# Patient Record
Sex: MALE | Race: ASIAN | NOT HISPANIC OR LATINO | URBAN - METROPOLITAN AREA
[De-identification: names, ages, dates, MRNs, and addresses within clinical notes are randomized per-mention and may not be internally consistent; named-entity substitution may affect disease eponyms.]

---

## 2018-10-11 ENCOUNTER — EMERGENCY (EMERGENCY)
Facility: HOSPITAL | Age: 53
LOS: 1 days | Discharge: HOME | End: 2018-10-11
Admitting: EMERGENCY MEDICINE
Payer: MEDICAID

## 2018-10-11 VITALS
OXYGEN SATURATION: 100 % | HEART RATE: 60 BPM | SYSTOLIC BLOOD PRESSURE: 107 MMHG | DIASTOLIC BLOOD PRESSURE: 64 MMHG | RESPIRATION RATE: 16 BRPM | TEMPERATURE: 98 F

## 2018-10-11 VITALS
OXYGEN SATURATION: 100 % | DIASTOLIC BLOOD PRESSURE: 69 MMHG | SYSTOLIC BLOOD PRESSURE: 99 MMHG | HEART RATE: 78 BPM | TEMPERATURE: 98 F | RESPIRATION RATE: 16 BRPM

## 2018-10-11 LAB
ALBUMIN SERPL ELPH-MCNC: 4.1 G/DL — SIGNIFICANT CHANGE UP (ref 3.3–5)
ALP SERPL-CCNC: 177 U/L — HIGH (ref 40–120)
ALT FLD-CCNC: 41 U/L — SIGNIFICANT CHANGE UP (ref 4–41)
AMYLASE P1 CFR SERPL: 34 U/L — SIGNIFICANT CHANGE UP (ref 25–125)
AST SERPL-CCNC: 46 U/L — HIGH (ref 4–40)
BASE EXCESS BLDV CALC-SCNC: -0.9 MMOL/L — SIGNIFICANT CHANGE UP
BASOPHILS # BLD AUTO: 0.03 K/UL — SIGNIFICANT CHANGE UP (ref 0–0.2)
BASOPHILS NFR BLD AUTO: 0.7 % — SIGNIFICANT CHANGE UP (ref 0–2)
BILIRUB SERPL-MCNC: 0.4 MG/DL — SIGNIFICANT CHANGE UP (ref 0.2–1.2)
BLOOD GAS VENOUS - CREATININE: 0.44 MG/DL — LOW (ref 0.5–1.3)
BUN SERPL-MCNC: 4 MG/DL — LOW (ref 7–23)
CALCIUM SERPL-MCNC: 8.9 MG/DL — SIGNIFICANT CHANGE UP (ref 8.4–10.5)
CHLORIDE BLDV-SCNC: 104 MMOL/L — SIGNIFICANT CHANGE UP (ref 96–108)
CHLORIDE SERPL-SCNC: 96 MMOL/L — LOW (ref 98–107)
CO2 SERPL-SCNC: 22 MMOL/L — SIGNIFICANT CHANGE UP (ref 22–31)
CREAT SERPL-MCNC: 0.4 MG/DL — LOW (ref 0.5–1.3)
EOSINOPHIL # BLD AUTO: 0.08 K/UL — SIGNIFICANT CHANGE UP (ref 0–0.5)
EOSINOPHIL NFR BLD AUTO: 1.9 % — SIGNIFICANT CHANGE UP (ref 0–6)
GAS PNL BLDV: 130 MMOL/L — LOW (ref 136–146)
GLUCOSE BLDV-MCNC: 129 — HIGH (ref 70–99)
GLUCOSE SERPL-MCNC: 139 MG/DL — HIGH (ref 70–99)
HCO3 BLDV-SCNC: 23 MMOL/L — SIGNIFICANT CHANGE UP (ref 20–27)
HCT VFR BLD CALC: 36.3 % — LOW (ref 39–50)
HCT VFR BLDV CALC: 33.3 % — LOW (ref 39–51)
HGB BLD-MCNC: 12 G/DL — LOW (ref 13–17)
HGB BLDV-MCNC: 10.8 G/DL — LOW (ref 13–17)
IMM GRANULOCYTES # BLD AUTO: 0.02 # — SIGNIFICANT CHANGE UP
IMM GRANULOCYTES NFR BLD AUTO: 0.5 % — SIGNIFICANT CHANGE UP (ref 0–1.5)
LACTATE BLDV-MCNC: 1.8 MMOL/L — SIGNIFICANT CHANGE UP (ref 0.5–2)
LIDOCAIN IGE QN: 5.1 U/L — LOW (ref 7–60)
LYMPHOCYTES # BLD AUTO: 0.71 K/UL — LOW (ref 1–3.3)
LYMPHOCYTES # BLD AUTO: 16.4 % — SIGNIFICANT CHANGE UP (ref 13–44)
MAGNESIUM SERPL-MCNC: 2.2 MG/DL — SIGNIFICANT CHANGE UP (ref 1.6–2.6)
MCHC RBC-ENTMCNC: 27.6 PG — SIGNIFICANT CHANGE UP (ref 27–34)
MCHC RBC-ENTMCNC: 33.1 % — SIGNIFICANT CHANGE UP (ref 32–36)
MCV RBC AUTO: 83.4 FL — SIGNIFICANT CHANGE UP (ref 80–100)
MONOCYTES # BLD AUTO: 0.67 K/UL — SIGNIFICANT CHANGE UP (ref 0–0.9)
MONOCYTES NFR BLD AUTO: 15.5 % — HIGH (ref 2–14)
NEUTROPHILS # BLD AUTO: 2.81 K/UL — SIGNIFICANT CHANGE UP (ref 1.8–7.4)
NEUTROPHILS NFR BLD AUTO: 65 % — SIGNIFICANT CHANGE UP (ref 43–77)
NRBC # FLD: 0 — SIGNIFICANT CHANGE UP
PCO2 BLDV: 45 MMHG — SIGNIFICANT CHANGE UP (ref 41–51)
PH BLDV: 7.35 PH — SIGNIFICANT CHANGE UP (ref 7.32–7.43)
PHOSPHATE SERPL-MCNC: 2.5 MG/DL — SIGNIFICANT CHANGE UP (ref 2.5–4.5)
PLATELET # BLD AUTO: 320 K/UL — SIGNIFICANT CHANGE UP (ref 150–400)
PMV BLD: 9.6 FL — SIGNIFICANT CHANGE UP (ref 7–13)
PO2 BLDV: 34 MMHG — LOW (ref 35–40)
POTASSIUM BLDV-SCNC: 3.4 MMOL/L — SIGNIFICANT CHANGE UP (ref 3.4–4.5)
POTASSIUM SERPL-MCNC: 4.3 MMOL/L — SIGNIFICANT CHANGE UP (ref 3.5–5.3)
POTASSIUM SERPL-SCNC: 4.3 MMOL/L — SIGNIFICANT CHANGE UP (ref 3.5–5.3)
PROT SERPL-MCNC: 7.6 G/DL — SIGNIFICANT CHANGE UP (ref 6–8.3)
RBC # BLD: 4.35 M/UL — SIGNIFICANT CHANGE UP (ref 4.2–5.8)
RBC # FLD: 15.9 % — HIGH (ref 10.3–14.5)
SAO2 % BLDV: 53.1 % — LOW (ref 60–85)
SODIUM SERPL-SCNC: 133 MMOL/L — LOW (ref 135–145)
WBC # BLD: 4.32 K/UL — SIGNIFICANT CHANGE UP (ref 3.8–10.5)
WBC # FLD AUTO: 4.32 K/UL — SIGNIFICANT CHANGE UP (ref 3.8–10.5)

## 2018-10-11 PROCEDURE — 99284 EMERGENCY DEPT VISIT MOD MDM: CPT

## 2018-10-11 PROCEDURE — 74177 CT ABD & PELVIS W/CONTRAST: CPT | Mod: 26

## 2018-10-11 RX ORDER — SODIUM CHLORIDE 9 MG/ML
1000 INJECTION INTRAMUSCULAR; INTRAVENOUS; SUBCUTANEOUS ONCE
Qty: 0 | Refills: 0 | Status: COMPLETED | OUTPATIENT
Start: 2018-10-11 | End: 2018-10-11

## 2018-10-11 RX ADMIN — SODIUM CHLORIDE 1000 MILLILITER(S): 9 INJECTION INTRAMUSCULAR; INTRAVENOUS; SUBCUTANEOUS at 17:22

## 2018-10-11 NOTE — ED PROVIDER NOTE - OBJECTIVE STATEMENT
53 year-old male with history of pancreatic cancer (undergoing chemo and radiation in [facility]) presents to the Emergency Department for nausea, vomiting and abdominal pain.  Patient last treated in May via chemotherapy. 53 year-old male with history of pancreatic cancer (undergoing chemo and radiation in [facility]) presents to the Emergency Department for nausea, vomiting and abdominal pain.  Patient last treated in May via chemotherapy.  Patient has a history of pancreatic adenocarcinoma diagnosed in late 2017 treated with resection, chemotherapy and radiotherapy in [facility].  Patient emigrated from [facility] last month and has no oncological follow-up at this time.  Reports upper abdominal reflux symptoms, and occasional nausea and vomiting that has worsened over the last 3 days.  Patient able to tolerate PO intake.

## 2018-10-11 NOTE — ED PROVIDER NOTE - CARE PROVIDERS DIRECT ADDRESSES
,DirectAddress_Unknown,[email or web address]

## 2018-10-11 NOTE — ED ADULT NURSE NOTE - OBJECTIVE STATEMENT
Break Coverage RN: Patient is a 54 y/o male a&ox4 with a PMH of pancreatic cancer and DM.  Patient reported pancreatic surgery in Nov of 2017, underwent chemo and radiation, last chemotherapy in May 2018.  Patient reports presenting to ED today with a c/c of RUQ pain radiating to LUQ.  Patient reports the pain is intermittent, worsens with PO intake.  Patient reports intermittent episodes of NBNB vomiting.  Denies fevers/chills, SOB, CP, dysuria/hematuria.  Patient in nad, respirations unlabored, MD at bedside. Break Coverage RN: Patient is a 52 y/o male a&ox4 with a PMH of pancreatic cancer and DM.  Patient reported pancreatic surgery in Nov of 2017, underwent chemo and radiation, last chemotherapy in May 2018.  Patient reports presenting to ED today with a c/c of RUQ pain radiating to LUQ.  Patient reports the pain is intermittent, worsens with PO intake.  Patient reports intermittent episodes of NBNB vomiting that has been ongoing since May of 2018.  Denies fevers/chills, SOB, CP, dysuria/hematuria.  Patient in nad, respirations unlabored, MD at bedside.

## 2018-10-11 NOTE — ED PROVIDER NOTE - PROGRESS NOTE DETAILS
Resident, Blinder: patient seen by Surgery for CT results. patient has documentation with him that states that he has had this same issue persistent but had further studies to prove that there is no obstruction. Patient feeling well, no N/V in ER. Has been passing gas and BM. States that he is vomiting because he ate too much, and usually vomits after that. Would like to get surg onc and med onc follow up. Will provide those details and send home. No other concerns.

## 2018-10-11 NOTE — ED PROVIDER NOTE - CARE PROVIDER_API CALL
Derek Fairbanks), Surgery  [address] [place] [address]  Phone: [phone]  Fax: [phone]    Jordon Almanza), Hematology; Internal Medicine; Medical Oncology  [address] [place] [address]  Phone: [phone]  Fax: [phone]

## 2018-10-11 NOTE — ED PROVIDER NOTE - PHYSICAL EXAMINATION
*Gen: NAD, AAO*3; appears thin but not acutely ill  *HEENT: NC/AT, MMM, airway patent, trachea midline  *CV: RRR, S1/S2 present, no murmurs/rubs/gallops  *Resp: no respiratory distress, LCTAB, no wheezing/rales/rhonchi  *Abd: non-distended, soft N/Tx4, no guarding or rigidity, surgical scars present  *Neuro: no focal neuro deficits, moving all limbs appropriately  *Extremities: no gross deformity  *Skin: no rashes, no wounds   ~ Karen Denis M.D.

## 2018-10-11 NOTE — ED ADULT NURSE NOTE - NSIMPLEMENTINTERV_GEN_ALL_ED
Implemented All Fall with Harm Risk Interventions:  [place] to call system. Call bell, personal items and telephone within reach. Instruct patient to call for assistance. Room bathroom lighting operational. Non-slip footwear when patient is off stretcher. Physically safe environment: no spills, clutter or unnecessary equipment. Stretcher in lowest position, wheels locked, appropriate side rails in place. Provide visual cue, wrist band, yellow gown, etc. Monitor gait and stability. Monitor for mental status changes and reorient to person, place, and time. Review medications for side effects contributing to fall risk. Reinforce activity limits and safety measures with patient and family. Provide visual clues: red socks.

## 2018-10-11 NOTE — ED ADULT NURSE REASSESSMENT NOTE - NS ED NURSE REASSESS COMMENT FT1
Pt. received in spot 24a; report from LA Todd RN. Pt. A&O x3 with c/o abdominal pain s/p CT scan. awaiting results. Pt. asking to eat. explained to patient that he has to wait on results before eating. no distress noted. no c/o pain.

## 2018-10-11 NOTE — ED PROVIDER NOTE - ATTENDING CONTRIBUTION TO CARE
carlyn: hx pancreatic adenoca with [place] and chemotherapy[place]. Pt notes intermittent vomiting since MAy 2018. [place] and [place] not yet established medical care. He presents with the hx of vomiting on an doff 2-3 days, c/o weakness, and requests referral to an oncologist.  exam: NAD. thin but does not appear acutely ill. exam otherwise unremarkable.   labs pending.  will hydrate, obtain CT abdomen, check labs. carlyn: hx pancreatic adenoca with resection and chemotherapy/[place]. Pt notes intermittent vomiting since MAy 2018. [place] and has not yet established medical care. He presents with the hx of vomiting on an doff 2-3 days, c/o weakness, and requests referral to an oncologist.  exam: NAD. thin but does not appear acutely ill. exam otherwise unremarkable.   labs pending.  will hydrate, obtain CT abdomen, check labs..

## 2018-10-11 NOTE — CONSULT NOTE ADULT - ASSESSMENT
53M s/p R1 resection [Whipple 11/2017] for T2N2M0 adenocarcinoma of the pancreas followed by adjuvant CRT w/Capecitabine / Gemcitabine who presents with recurrent nausea, vomiting and CT imaging showing a stable dilated proximal jejunum of Timmy limb. Finished CRT in May/2018 without evidence of recurrent disease.       - While marked distention of the gastrojejunal limb to the level of the distal anastomosis was concerning for obstruction, patient has recent reports w/gastroffin showing non-obstructed anatomy. Patient is not clinically obstructed as he remains passing flatus and having normal bowel movements  - no current surgical intervention indicated  - patient may establish care [place] with Dr. Fairbanks [Surgical Oncology Office#:[phone]]  - sent of CEA 19-9 level for followup care  - above plan d/w Dr. Tiki Lamas [facility] PGY3  [id number]

## 2018-10-11 NOTE — ED ADULT NURSE NOTE - CHIEF COMPLAINT QUOTE
Pt states that he was undergoing chemo and radiation in [facility] for pancreatic cancer, last treatment in May, now c/o nausea, vomiting and abd pain since February.  PMH pancreatic cancer

## 2018-10-11 NOTE — CONSULT NOTE ADULT - SUBJECTIVE AND OBJECTIVE BOX
Surgical Oncology  CC: s/p Whipple, p/w nausea and vomiting  HPI: 53M s/p R1 resection [Whipple 11/2017] for T2N2M0 adenocarcinoma of the pancreas followed by adjuvant CRT w/Capecitabine / Gemcitabine who presents with recurrent nausea, vomiting and CT imaging showing a stable dilated proximal jejunum of Timmy limb. Finished CRT in May/2018 without evidence of recurrent disease. While marked distention of the gastrojejunal limb to the level of the distal anastomosis was concerning for   obstruction, patient has recent reports w/gastroffin showing non-obstructed anatomy. Patient would like surgical and medical oncology [place]. Patient is not clinically obstructed, as he remains passing flatus and having bowel movements. He is non-toxic appearing, afebrile, non-leukocytotic and hemodynamically normal.       Medical and Surgical History  Pancreatic cancer s/p Whipple      Review of Systems:   General: denies weight change, fever or fatigue  HEENT: denies sore throat, hoarseness  Respiratory: denies cough, shortness of breath at rest and on exertion, wheezing  Cardiovascular: denies chest pain, abnormal heart rhythm, PND, palpitations  Gastrointestinal: resolved nausea and vomiting; denies diarrhea, bloody or black bowel movements  Genitourinary: denies frequent urination, painful urination, kidney disease         Social History  Smoking Hx: denies  Etoh Hx: denies  IVDA Hx: denies     Objective:   Vital Signs Last 24 Hrs  T(C): 36.6 (11 Oct 2018 21:23), Max: 36.9 (11 Oct 2018 15:59)  T(F): 97.8 (11 Oct 2018 21:23), Max: 98.5 (11 Oct 2018 15:59)  HR: 60 (11 Oct 2018 21:23) (60 - 78)  BP: 107/64 (11 Oct 2018 21:23) (99/69 - 107/64)  BP(mean): --  RR: 16 (11 Oct 2018 21:23) (16 - 16)  SpO2: 100% (11 Oct 2018 21:23) (100% - 100%)    Physical Exam:  General: Well developed, well nourished,   and appears to be in no acute distress.  HEENT: normocephalic   Neck: Neck supple, non-tender without lymphadenopathy, masses or thyromegaly  Chest: lungs clear bilaterally, non-labored breathing, no wheezing or rhonci  Cardiac: Regular rhythm, rate of 66  Abdomen: soft, non-distended, non-tender, no guarding or rebound  Extremities: No significant deformity or joint abnormality. No edema. Peripheral pulses intact. No varicosities.       LABS:                        12.0   4.32  )-----------( 320      ( 11 Oct 2018 17:17 )             36.3     10-11    133<L>  |  96<L>  |  4<L>  ----------------------------<  139<H>  4.3   |  22  |  0.40<L>    Ca    8.9      11 Oct 2018 17:17  Phos  2.5     10-11  Mg     2.2     10-11    TPro  7.6  /  Alb  4.1  /  TBili  0.4  /  DBili  x   /  AST  46<H>  /  ALT  41  /  AlkPhos  177<H>  10-11

## 2018-10-11 NOTE — ED PROVIDER NOTE - MEDICAL DECISION MAKING DETAILS
53 year-old male with history of pancreatic cancer (undergoing chemo and radiation in [facility]) presents to the ED for nausea, vomiting and abdominal pain - non-toxic appearing and benign abdominal exam.  Plan to do a CT with PO and IC contrast to eval for obstruction given history of cancer, resection and radiation therapy.  CBC, CMP, IVF and reassess.

## 2018-10-12 ENCOUNTER — INPATIENT (INPATIENT)
Facility: HOSPITAL | Age: 53
LOS: 11 days | Discharge: HOME | End: 2018-10-24
Attending: SURGERY | Admitting: SURGERY
Payer: MEDICAID

## 2018-10-12 VITALS
DIASTOLIC BLOOD PRESSURE: 64 MMHG | OXYGEN SATURATION: 100 % | TEMPERATURE: 97 F | RESPIRATION RATE: 16 BRPM | HEART RATE: 70 BPM | SYSTOLIC BLOOD PRESSURE: 91 MMHG

## 2018-10-12 DIAGNOSIS — K56.609 UNSPECIFIED INTESTINAL OBSTRUCTION, UNSPECIFIED AS TO PARTIAL VERSUS COMPLETE OBSTRUCTION: ICD-10-CM

## 2018-10-12 LAB — CANCER AG19-9 SERPL-ACNC: < 1 U/ML — SIGNIFICANT CHANGE UP

## 2018-10-12 PROCEDURE — 99223 1ST HOSP IP/OBS HIGH 75: CPT

## 2018-10-12 RX ORDER — PANTOPRAZOLE SODIUM 20 MG/1
40 TABLET, DELAYED RELEASE ORAL DAILY
Qty: 0 | Refills: 0 | Status: DISCONTINUED | OUTPATIENT
Start: 2018-10-12 | End: 2018-10-15

## 2018-10-12 RX ORDER — ENOXAPARIN SODIUM 100 MG/ML
40 INJECTION SUBCUTANEOUS DAILY
Qty: 0 | Refills: 0 | Status: DISCONTINUED | OUTPATIENT
Start: 2018-10-12 | End: 2018-10-18

## 2018-10-12 RX ORDER — INSULIN LISPRO 100/ML
VIAL (ML) SUBCUTANEOUS
Qty: 0 | Refills: 0 | Status: DISCONTINUED | OUTPATIENT
Start: 2018-10-12 | End: 2018-10-12

## 2018-10-12 RX ORDER — INSULIN LISPRO 100/ML
VIAL (ML) SUBCUTANEOUS EVERY 6 HOURS
Qty: 0 | Refills: 0 | Status: DISCONTINUED | OUTPATIENT
Start: 2018-10-12 | End: 2018-10-15

## 2018-10-12 RX ORDER — SODIUM CHLORIDE 9 MG/ML
1000 INJECTION INTRAMUSCULAR; INTRAVENOUS; SUBCUTANEOUS
Qty: 0 | Refills: 0 | Status: DISCONTINUED | OUTPATIENT
Start: 2018-10-12 | End: 2018-10-12

## 2018-10-12 RX ORDER — SODIUM CHLORIDE 9 MG/ML
1000 INJECTION, SOLUTION INTRAVENOUS
Qty: 0 | Refills: 0 | Status: DISCONTINUED | OUTPATIENT
Start: 2018-10-12 | End: 2018-10-15

## 2018-10-12 RX ORDER — SODIUM CHLORIDE 9 MG/ML
1000 INJECTION INTRAMUSCULAR; INTRAVENOUS; SUBCUTANEOUS ONCE
Qty: 0 | Refills: 0 | Status: COMPLETED | OUTPATIENT
Start: 2018-10-12 | End: 2018-10-12

## 2018-10-12 RX ADMIN — SODIUM CHLORIDE 1000 MILLILITER(S): 9 INJECTION INTRAMUSCULAR; INTRAVENOUS; SUBCUTANEOUS at 15:29

## 2018-10-12 RX ADMIN — SODIUM CHLORIDE 125 MILLILITER(S): 9 INJECTION INTRAMUSCULAR; INTRAVENOUS; SUBCUTANEOUS at 16:54

## 2018-10-12 RX ADMIN — SODIUM CHLORIDE 125 MILLILITER(S): 9 INJECTION, SOLUTION INTRAVENOUS at 20:06

## 2018-10-12 NOTE — ED ADULT NURSE NOTE - OBJECTIVE STATEMENT
Pt a&ox3 c/o abdominal pain accompanied with n/v, pt was treated here yesterday and dc'd, called back to ED for SOB, pt denies sob breathing even and unlabored, denies cp/discomfort, denies headache/dizziness, abd soft, non-tender, non-distended, skin is cool dry and intact, ivl placed, labs sent, will continue to monitor.

## 2018-10-12 NOTE — H&P ADULT - HISTORY OF PRESENT ILLNESS
HPI: 53M s/p R1 resection [Whipple 11/2017] for T2N2M0 adenocarcinoma of the pancreas followed by adjuvant CRT w/Capecitabine / Gemcitabine who presents with recurrent nausea, vomiting and CT imaging showing a stable dilated proximal jejunum of Timmy limb. Finished CRT in May/2018 without evidence of recurrent disease. He underwent small bowel study showing passage of the contrast to the large intestine, however, he had a stricture at the J-J. His surgeon advised him to take 6 small meals instead of 3 meals. His symptoms slightly improved but he continues to have distension and nausea, which improves after vomiting. A CT scan of the abdomen performed yesterday showed bowel obstruction at the J-J anastomosis with marked dilatation of the small bowel.      Medical and Surgical History  Pancreatic cancer s/p Whipple    Review of Systems:   General: denies weight change, fever or fatigue  HEENT: denies sore throat, hoarseness  Respiratory: denies cough, shortness of breath at rest and on exertion, wheezing  Cardiovascular: denies chest pain, abnormal heart rhythm, PND, palpitations  Gastrointestinal: resolved nausea and vomiting; denies diarrhea, bloody or black bowel movements  Genitourinary: denies frequent urination, painful urination, kidney disease         Social History  Smoking Hx: denies  Etoh Hx: denies  IVDA Hx: denies     Objective:   Vital Signs Last 24 Hrs  T(C): 36.6 (11 Oct 2018 21:23), Max: 36.9 (11 Oct 2018 15:59)  T(F): 97.8 (11 Oct 2018 21:23), Max: 98.5 (11 Oct 2018 15:59)  HR: 60 (11 Oct 2018 21:23) (60 - 78)  BP: 107/64 (11 Oct 2018 21:23) (99/69 - 107/64)  BP(mean): --  RR: 16 (11 Oct 2018 21:23) (16 - 16)  SpO2: 100% (11 Oct 2018 21:23) (100% - 100%)    Physical Exam:  General: Well developed, well nourished,   and appears to be in no acute distress.  HEENT: normocephalic   Neck: Neck supple, non-tender without lymphadenopathy, masses or thyromegaly  Chest: lungs clear bilaterally, non-labored breathing, no wheezing or rhonci  Cardiac: Regular rhythm, rate of 66  Abdomen: soft, non-distended, non-tender, no guarding or rebound  Extremities: No significant deformity or joint abnormality. No edema. Peripheral pulses intact. No varicosities.       LABS:                        12.0   4.32  )-----------( 320      ( 11 Oct 2018 17:17 )             36.3     10-11    133<L>  |  96<L>  |  4<L>  ----------------------------<  139<H>  4.3   |  22  |  0.40<L>    Ca    8.9      11 Oct 2018 17:17  Phos  2.5     10-11  Mg     2.2     10-11    TPro  7.6  /  Alb  4.1  /  TBili  0.4  /  DBili  x   /  AST  46<H>  /  ALT  41  /  AlkPhos  177<H>  10-11

## 2018-10-12 NOTE — ED ADULT TRIAGE NOTE - CHIEF COMPLAINT QUOTE
Pt dcd yesterday for NV, called back today for SBO. Pt endorses mild abd pain at present time. Denies NV.

## 2018-10-12 NOTE — ED PROVIDER NOTE - ATTENDING CONTRIBUTION TO CARE
Attending note:   After face to face evaluation of this patient, I concur with above noted hx, pe, and care plan for this patient.  52 y/o M seen in ED yesterday with reading of sbo; patient re-called after discharge from ED yesterday.    Surgery in evaluating patient at present.

## 2018-10-12 NOTE — ED ADULT NURSE REASSESSMENT NOTE - NS ED NURSE REASSESS COMMENT FT1
Pt a&ox3 consulted by surgery team, pt adamantly refusing iv placement and blood work, er medical team and surgery team aware, pt to have blood work drawn tomorrow. Pt currently has no iv in place and will be pending blood work for tomorrow.

## 2018-10-12 NOTE — ED ADULT NURSE NOTE - NSIMPLEMENTINTERV_GEN_ALL_ED
Implemented All Universal Safety Interventions:  [place] to call system. Call bell, personal items and telephone within reach. Instruct patient to call for assistance. Room bathroom lighting operational. Non-slip footwear when patient is off stretcher. Physically safe environment: no spills, clutter or unnecessary equipment. Stretcher in lowest position, wheels locked, appropriate side rails in place.

## 2018-10-12 NOTE — ED PROVIDER NOTE - MEDICAL DECISION MAKING DETAILS
s/p whipple with dilated jejunum on CT yesterday.   Patient recalled by dept of surgery for further evaluation

## 2018-10-12 NOTE — ED PROVIDER NOTE - OBJECTIVE STATEMENT
52 yo M 52 yo M s/p whipple with recent CT concerning for proximal jejunal obstruction.     Patient asymptomatic at present; recalled by department of surgery for evaluation

## 2018-10-12 NOTE — H&P ADULT - ASSESSMENT
53M s/p R1 resection [Whipple 11/2017] for T2N2M0 adenocarcinoma of the pancreas followed by adjuvant CRT w/Capecitabine / Gemcitabine who presents with chronic intermittent nausea, vomiting and CT imaging showing a dilated proximal jejunum of Timmy limb secondary to structure at the J-J.    - Admit to D Team (Surgical Oncology) - Dr. Fairbanks   - The patient will need a revision of the jejunojejunostomy during this admission. The benefits of the procedure explained to the patient and his family. Will plan for surgical intervention early next week.   - Nil Per Os with sips of clears.   - IVF to prevent dehydration  - Will obtain a CEA level.    - above plan d/w Dr. Fairbanks

## 2018-10-13 LAB
APTT BLD: 37.7 SEC — HIGH (ref 27.5–37.4)
BUN SERPL-MCNC: 4 MG/DL — LOW (ref 7–23)
CALCIUM SERPL-MCNC: 8.4 MG/DL — SIGNIFICANT CHANGE UP (ref 8.4–10.5)
CEA SERPL-MCNC: 4.1 NG/ML — HIGH (ref 1–3.8)
CHLORIDE SERPL-SCNC: 103 MMOL/L — SIGNIFICANT CHANGE UP (ref 98–107)
CO2 SERPL-SCNC: 24 MMOL/L — SIGNIFICANT CHANGE UP (ref 22–31)
CREAT SERPL-MCNC: 0.46 MG/DL — LOW (ref 0.5–1.3)
GLUCOSE SERPL-MCNC: 107 MG/DL — HIGH (ref 70–99)
HBA1C BLD-MCNC: 6.6 % — HIGH (ref 4–5.6)
HCT VFR BLD CALC: 34.9 % — LOW (ref 39–50)
HGB BLD-MCNC: 11.4 G/DL — LOW (ref 13–17)
INR BLD: 1.14 — SIGNIFICANT CHANGE UP (ref 0.88–1.17)
MCHC RBC-ENTMCNC: 27.5 PG — SIGNIFICANT CHANGE UP (ref 27–34)
MCHC RBC-ENTMCNC: 32.7 % — SIGNIFICANT CHANGE UP (ref 32–36)
MCV RBC AUTO: 84.3 FL — SIGNIFICANT CHANGE UP (ref 80–100)
NRBC # FLD: 0 — SIGNIFICANT CHANGE UP
PLATELET # BLD AUTO: 304 K/UL — SIGNIFICANT CHANGE UP (ref 150–400)
PMV BLD: 10 FL — SIGNIFICANT CHANGE UP (ref 7–13)
POTASSIUM SERPL-MCNC: 4.1 MMOL/L — SIGNIFICANT CHANGE UP (ref 3.5–5.3)
POTASSIUM SERPL-SCNC: 4.1 MMOL/L — SIGNIFICANT CHANGE UP (ref 3.5–5.3)
PROTHROM AB SERPL-ACNC: 12.7 SEC — SIGNIFICANT CHANGE UP (ref 9.8–13.1)
RBC # BLD: 4.14 M/UL — LOW (ref 4.2–5.8)
RBC # FLD: 15.9 % — HIGH (ref 10.3–14.5)
SODIUM SERPL-SCNC: 139 MMOL/L — SIGNIFICANT CHANGE UP (ref 135–145)
WBC # BLD: 4.17 K/UL — SIGNIFICANT CHANGE UP (ref 3.8–10.5)
WBC # FLD AUTO: 4.17 K/UL — SIGNIFICANT CHANGE UP (ref 3.8–10.5)

## 2018-10-13 PROCEDURE — 99232 SBSQ HOSP IP/OBS MODERATE 35: CPT

## 2018-10-13 RX ORDER — INFLUENZA VIRUS VACCINE 15; 15; 15; 15 UG/.5ML; UG/.5ML; UG/.5ML; UG/.5ML
0.5 SUSPENSION INTRAMUSCULAR ONCE
Qty: 0 | Refills: 0 | Status: DISCONTINUED | OUTPATIENT
Start: 2018-10-13 | End: 2018-10-24

## 2018-10-13 RX ORDER — SODIUM CHLORIDE 9 MG/ML
500 INJECTION INTRAMUSCULAR; INTRAVENOUS; SUBCUTANEOUS ONCE
Qty: 0 | Refills: 0 | Status: COMPLETED | OUTPATIENT
Start: 2018-10-13 | End: 2018-10-13

## 2018-10-13 RX ORDER — SODIUM CHLORIDE 9 MG/ML
1000 INJECTION, SOLUTION INTRAVENOUS ONCE
Qty: 0 | Refills: 0 | Status: COMPLETED | OUTPATIENT
Start: 2018-10-13 | End: 2018-10-13

## 2018-10-13 RX ADMIN — SODIUM CHLORIDE 1000 MILLILITER(S): 9 INJECTION INTRAMUSCULAR; INTRAVENOUS; SUBCUTANEOUS at 03:30

## 2018-10-13 RX ADMIN — PANTOPRAZOLE SODIUM 40 MILLIGRAM(S): 20 TABLET, DELAYED RELEASE ORAL at 12:58

## 2018-10-13 RX ADMIN — Medication 2: at 13:47

## 2018-10-13 RX ADMIN — SODIUM CHLORIDE 2000 MILLILITER(S): 9 INJECTION, SOLUTION INTRAVENOUS at 18:20

## 2018-10-13 RX ADMIN — SODIUM CHLORIDE 125 MILLILITER(S): 9 INJECTION, SOLUTION INTRAVENOUS at 18:25

## 2018-10-13 RX ADMIN — ENOXAPARIN SODIUM 40 MILLIGRAM(S): 100 INJECTION SUBCUTANEOUS at 12:58

## 2018-10-13 NOTE — PROGRESS NOTE ADULT - SUBJECTIVE AND OBJECTIVE BOX
S: Patient admitted overnight for chronic intermittent nausea, vomiting.  Patient seen and examined.  No acute events overnight. Pain controlled.      O: Vital Signs  T(C): 36.3 (10-13 @ 09:57), Max: 36.8 (10-12 @ 17:31)  HR: 52 (10-13 @ 09:57) (52 - 80)  BP: 98/57 (10-13 @ 09:57) (87/50 - 105/64)  RR: 15 (10-13 @ 09:57) (15 - 18)  SpO2: 100% (10-13 @ 09:57) (100% - 100%)  10-12-18 @ 07:01  -  10-13-18 @ 07:00  --------------------------------------------------------  IN: 0 mL / OUT: 320 mL / NET: -320 mL      General: Well developed, well nourished, NAD  HEENT: NC/AT  Neck: Neck supple, non-tender without lymphadenopathy, masses or thyromegaly  Chest: lungs clear bilaterally, non-labored breathing, no wheezing or rhonci  Cardiac: RRR  Abdomen: soft, non-distended, non-tender, no guarding or rebound  Extremities: No significant deformity or joint abnormality. No edema. Peripheral pulses intact.                         11.4   4.17  )-----------( 304      ( 13 Oct 2018 06:00 )             34.9   10-13    139  |  103  |  4<L>  ----------------------------<  107<H>  4.1   |  24  |  0.46<L>    Ca    8.4      13 Oct 2018 06:35  Phos  2.5     10-11  Mg     2.2     10-11    TPro  7.6  /  Alb  4.1  /  TBili  0.4  /  DBili  x   /  AST  46<H>  /  ALT  41  /  AlkPhos  177<H>  10-11

## 2018-10-13 NOTE — PROGRESS NOTE ADULT - ASSESSMENT
53M s/p R1 resection [Whipple 11/2017] for T2N2M0 adenocarcinoma of the pancreas followed by adjuvant CRT w/Capecitabine / Gemcitabine who presented with chronic intermittent nausea, vomiting and CT imaging showing a dilated proximal jejunum of Timmy limb secondary to structure at the J-J.    - Clear liquid diet  - Pain control  - Plan for OR next week for revision   - OOB  - DVT ppx     Patient seen and examined with Dr. Fairbanks

## 2018-10-14 PROCEDURE — 99232 SBSQ HOSP IP/OBS MODERATE 35: CPT

## 2018-10-14 RX ADMIN — Medication 1: at 17:54

## 2018-10-14 RX ADMIN — SODIUM CHLORIDE 75 MILLILITER(S): 9 INJECTION, SOLUTION INTRAVENOUS at 21:35

## 2018-10-14 RX ADMIN — ENOXAPARIN SODIUM 40 MILLIGRAM(S): 100 INJECTION SUBCUTANEOUS at 12:13

## 2018-10-14 RX ADMIN — Medication 1: at 01:24

## 2018-10-14 RX ADMIN — SODIUM CHLORIDE 75 MILLILITER(S): 9 INJECTION, SOLUTION INTRAVENOUS at 12:51

## 2018-10-14 RX ADMIN — PANTOPRAZOLE SODIUM 40 MILLIGRAM(S): 20 TABLET, DELAYED RELEASE ORAL at 12:14

## 2018-10-14 NOTE — PROGRESS NOTE ADULT - ASSESSMENT
53M s/p R1 resection [Whipple 11/2017] for T2N2M0 adenocarcinoma of the pancreas followed by adjuvant CRT w/Capecitabine / Gemcitabine who presented with chronic intermittent nausea, vomiting and CT imaging showing a dilated proximal jejunum of Timmy limb secondary to structure at the J-J.    - Full liquid diet  - Pain control  - Plan for OR next week for revision   - OOB  - DVT ppx     Patient seen and examined with Dr. Fairbanks

## 2018-10-14 NOTE — PROGRESS NOTE ADULT - SUBJECTIVE AND OBJECTIVE BOX
S: Patient admitted overnight for chronic intermittent nausea, vomiting.  Patient seen and examined.  No acute events overnight. Pain controlled.      Vital Signs Last 24 Hrs  T(C): 36.6 (14 Oct 2018 09:22), Max: 36.7 (13 Oct 2018 15:42)  T(F): 97.8 (14 Oct 2018 09:22), Max: 98.1 (13 Oct 2018 15:42)  HR: 60 (14 Oct 2018 09:22) (51 - 60)  BP: 92/61 (14 Oct 2018 09:22) (92/61 - 102/60)  BP(mean): --  RR: 17 (14 Oct 2018 09:22) (16 - 18)  SpO2: 100% (14 Oct 2018 09:22) (100% - 100%)    I&O's Summary    13 Oct 2018 07:01  -  14 Oct 2018 07:00  --------------------------------------------------------  IN: 1500 mL / OUT: 300 mL / NET: 1200 mL    14 Oct 2018 07:01  -  14 Oct 2018 13:26  --------------------------------------------------------  IN: 970 mL / OUT: 0 mL / NET: 970 mL      General: Well developed, well nourished, NAD  HEENT: NC/AT  Neck: Neck supple, non-tender without lymphadenopathy, masses or thyromegaly  Chest: lungs clear bilaterally, non-labored breathing, no wheezing or rhonci  Cardiac: RRR  Abdomen: soft, non-distended, non-tender, no guarding or rebound  Extremities: No significant deformity or joint abnormality. No edema. Peripheral pulses intact.                              11.4   4.17  )-----------( 304      ( 13 Oct 2018 06:00 )             34.9       10-13    139  |  103  |  4<L>  ----------------------------<  107<H>  4.1   |  24  |  0.46<L>    Ca    8.4      13 Oct 2018 06:35

## 2018-10-15 DIAGNOSIS — Z01.818 ENCOUNTER FOR OTHER PREPROCEDURAL EXAMINATION: ICD-10-CM

## 2018-10-15 DIAGNOSIS — K56.609 UNSPECIFIED INTESTINAL OBSTRUCTION, UNSPECIFIED AS TO PARTIAL VERSUS COMPLETE OBSTRUCTION: ICD-10-CM

## 2018-10-15 DIAGNOSIS — C25.9 MALIGNANT NEOPLASM OF PANCREAS, UNSPECIFIED: ICD-10-CM

## 2018-10-15 LAB
BUN SERPL-MCNC: 3 MG/DL — LOW (ref 7–23)
CALCIUM SERPL-MCNC: 8.4 MG/DL — SIGNIFICANT CHANGE UP (ref 8.4–10.5)
CHLORIDE SERPL-SCNC: 104 MMOL/L — SIGNIFICANT CHANGE UP (ref 98–107)
CO2 SERPL-SCNC: 25 MMOL/L — SIGNIFICANT CHANGE UP (ref 22–31)
CREAT SERPL-MCNC: 0.42 MG/DL — LOW (ref 0.5–1.3)
GLUCOSE SERPL-MCNC: 160 MG/DL — HIGH (ref 70–99)
HCT VFR BLD CALC: 30.1 % — LOW (ref 39–50)
HGB BLD-MCNC: 9.9 G/DL — LOW (ref 13–17)
MAGNESIUM SERPL-MCNC: 2.1 MG/DL — SIGNIFICANT CHANGE UP (ref 1.6–2.6)
MCHC RBC-ENTMCNC: 27.6 PG — SIGNIFICANT CHANGE UP (ref 27–34)
MCHC RBC-ENTMCNC: 32.9 % — SIGNIFICANT CHANGE UP (ref 32–36)
MCV RBC AUTO: 83.8 FL — SIGNIFICANT CHANGE UP (ref 80–100)
NRBC # FLD: 0 — SIGNIFICANT CHANGE UP
PHOSPHATE SERPL-MCNC: 4.3 MG/DL — SIGNIFICANT CHANGE UP (ref 2.5–4.5)
PLATELET # BLD AUTO: 241 K/UL — SIGNIFICANT CHANGE UP (ref 150–400)
PMV BLD: 10.3 FL — SIGNIFICANT CHANGE UP (ref 7–13)
POTASSIUM SERPL-MCNC: 3.6 MMOL/L — SIGNIFICANT CHANGE UP (ref 3.5–5.3)
POTASSIUM SERPL-SCNC: 3.6 MMOL/L — SIGNIFICANT CHANGE UP (ref 3.5–5.3)
RBC # BLD: 3.59 M/UL — LOW (ref 4.2–5.8)
RBC # FLD: 16.1 % — HIGH (ref 10.3–14.5)
SODIUM SERPL-SCNC: 141 MMOL/L — SIGNIFICANT CHANGE UP (ref 135–145)
WBC # BLD: 3.23 K/UL — LOW (ref 3.8–10.5)
WBC # FLD AUTO: 3.23 K/UL — LOW (ref 3.8–10.5)

## 2018-10-15 PROCEDURE — 99232 SBSQ HOSP IP/OBS MODERATE 35: CPT | Mod: 57

## 2018-10-15 PROCEDURE — 99223 1ST HOSP IP/OBS HIGH 75: CPT | Mod: GC

## 2018-10-15 PROCEDURE — 93010 ELECTROCARDIOGRAM REPORT: CPT

## 2018-10-15 PROCEDURE — 71045 X-RAY EXAM CHEST 1 VIEW: CPT | Mod: 26

## 2018-10-15 RX ORDER — LIPASE/PROTEASE/AMYLASE 16-48-48K
1 CAPSULE,DELAYED RELEASE (ENTERIC COATED) ORAL THREE TIMES A DAY
Qty: 0 | Refills: 0 | Status: DISCONTINUED | OUTPATIENT
Start: 2018-10-15 | End: 2018-10-17

## 2018-10-15 RX ORDER — INSULIN LISPRO 100/ML
VIAL (ML) SUBCUTANEOUS
Qty: 0 | Refills: 0 | Status: DISCONTINUED | OUTPATIENT
Start: 2018-10-15 | End: 2018-10-17

## 2018-10-15 RX ORDER — PANTOPRAZOLE SODIUM 20 MG/1
40 TABLET, DELAYED RELEASE ORAL
Qty: 0 | Refills: 0 | Status: DISCONTINUED | OUTPATIENT
Start: 2018-10-15 | End: 2018-10-18

## 2018-10-15 RX ADMIN — Medication 1: at 12:57

## 2018-10-15 RX ADMIN — Medication 1 CAPSULE(S): at 23:11

## 2018-10-15 RX ADMIN — ENOXAPARIN SODIUM 40 MILLIGRAM(S): 100 INJECTION SUBCUTANEOUS at 12:58

## 2018-10-15 RX ADMIN — PANTOPRAZOLE SODIUM 40 MILLIGRAM(S): 20 TABLET, DELAYED RELEASE ORAL at 09:40

## 2018-10-15 NOTE — CONSULT NOTE ADULT - SUBJECTIVE AND OBJECTIVE BOX
HPI:  HPI: 53M s/p R1 resection [Whipple 11/2017] for T2N2M0 adenocarcinoma of the pancreas followed by adjuvant CRT w/Capecitabine / Gemcitabine who presents with recurrent nausea, vomiting and CT imaging showing a stable dilated proximal jejunum of Timmy limb. Finished CRT in May/2018 without evidence of recurrent disease. He underwent small bowel study showing passage of the contrast to the large intestine, however, he had a stricture at the J-J. His surgeon advised him to take 6 small meals instead of 3 meals. His symptoms slightly improved but he continues to have distension and nausea, which improves after vomiting. A CT scan of the abdomen performed yesterday showed bowel obstruction at the J-J anastomosis with marked dilatation of the small bowel.      Medical and Surgical History  Type 2 Diabetes   Pancreatic cancer s/p Whipple    Review of Systems:   General: denies weight change, fever or fatigue  HEENT: denies sore throat, hoarseness  Respiratory: denies cough, shortness of breath at rest and on exertion, wheezing  Cardiovascular: denies chest pain, abnormal heart rhythm, PND, palpitations  Gastrointestinal: resolved nausea and vomiting; denies diarrhea, bloody or black bowel movements  Genitourinary: denies frequent urination, painful urination, kidney disease         Social History  Smoking Hx: denies  Etoh Hx: denies  IVDA Hx: denies     Objective:   Vital Signs Last 24 Hrs  T(C): 36.6 (11 Oct 2018 21:23), Max: 36.9 (11 Oct 2018 15:59)  T(F): 97.8 (11 Oct 2018 21:23), Max: 98.5 (11 Oct 2018 15:59)  HR: 60 (11 Oct 2018 21:23) (60 - 78)  BP: 107/64 (11 Oct 2018 21:23) (99/69 - 107/64)  BP(mean): --  RR: 16 (11 Oct 2018 21:23) (16 - 16)  SpO2: 100% (11 Oct 2018 21:23) (100% - 100%)    Physical Exam:  General: Well developed, well nourished,   and appears to be in no acute distress.  HEENT: normocephalic   Neck: Neck supple, non-tender without lymphadenopathy, masses or thyromegaly  Chest: lungs clear bilaterally, non-labored breathing, no wheezing or rhonci  Cardiac: Regular rhythm, rate of 66  Abdomen: soft, non-distended, non-tender, no guarding or rebound  Extremities: No significant deformity or joint abnormality. No edema. Peripheral pulses intact. No varicosities.       LABS:                        12.0   4.32  )-----------( 320      ( 11 Oct 2018 17:17 )             36.3     10-11    133<L>  |  96<L>  |  4<L>  ----------------------------<  139<H>  4.3   |  22  |  0.40<L>    Ca    8.9      11 Oct 2018 17:17  Phos  2.5     10-11  Mg     2.2     10-11    TPro  7.6  /  Alb  4.1  /  TBili  0.4  /  DBili  x   /  AST  46<H>  /  ALT  41  /  AlkPhos  177<H>  10-11 (12 Oct 2018 13:24)      PAST MEDICAL & SURGICAL HISTORY:  Pancreatic cancer      Review of Systems:   CONSTITUTIONAL: No fever, weight loss, or fatigue  EYES: No eye pain, visual disturbances, or discharge  ENMT:  No difficulty hearing, tinnitus, vertigo; No sinus or throat pain  NECK: No pain or stiffness  BREASTS: No pain, masses, or nipple discharge  RESPIRATORY: No cough, wheezing, chills or hemoptysis; No shortness of breath  CARDIOVASCULAR: No chest pain, palpitations, dizziness, or leg swelling  GASTROINTESTINAL: No abdominal or epigastric pain. No nausea, vomiting, or hematemesis; No diarrhea or constipation. No melena or hematochezia.  GENITOURINARY: No dysuria, frequency, hematuria, or incontinence  NEUROLOGICAL: No headaches, memory loss, loss of strength, numbness, or tremors  SKIN: No itching, burning, rashes, or lesions   LYMPH NODES: No enlarged glands  ENDOCRINE: No heat or cold intolerance; No hair loss  MUSCULOSKELETAL: No joint pain or swelling; No muscle, back, or extremity pain  PSYCHIATRIC: No depression, anxiety, mood swings, or difficulty sleeping  HEME/LYMPH: No easy bruising, or bleeding gums  ALLERY AND IMMUNOLOGIC: No hives or eczema    Allergies    No Known Allergies    Intolerances        Social History:     FAMILY HISTORY:  No pertinent family history in first degree relatives      MEDICATIONS  (STANDING):  dextrose 5% + sodium chloride 0.9%. 1000 milliLiter(s) (75 mL/Hr) IV Continuous <Continuous>  enoxaparin Injectable 40 milliGRAM(s) SubCutaneous daily  influenza   Vaccine 0.5 milliLiter(s) IntraMuscular once  insulin lispro (HumaLOG) corrective regimen sliding scale   SubCutaneous every 6 hours  pantoprazole    Tablet 40 milliGRAM(s) Oral before breakfast    MEDICATIONS  (PRN):        CAPILLARY BLOOD GLUCOSE      POCT Blood Glucose.: 143 mg/dL (15 Oct 2018 07:11)  POCT Blood Glucose.: 120 mg/dL (15 Oct 2018 02:06)  POCT Blood Glucose.: 184 mg/dL (14 Oct 2018 17:54)    I&O's Summary    14 Oct 2018 07:01  -  15 Oct 2018 07:00  --------------------------------------------------------  IN: 1920 mL / OUT: 950 mL / NET: 970 mL    15 Oct 2018 07:01  -  15 Oct 2018 12:03  --------------------------------------------------------  IN: 375 mL / OUT: 320 mL / NET: 55 mL        PHYSICAL EXAM:  GENERAL: NAD, well-developed  HEAD:  Atraumatic, Normocephalic  EYES: EOMI, PERRLA, conjunctiva and sclera clear  NECK: Supple, No JVD  CHEST/LUNG: Clear to auscultation bilaterally; No wheeze  HEART: Regular rate and rhythm; No murmurs, rubs, or gallops  ABDOMEN: Soft, Nontender, Nondistended; Bowel sounds present  EXTREMITIES:  2+ Peripheral Pulses, No clubbing, cyanosis, or edema  PSYCH: AAOx3  NEUROLOGY: non-focal  SKIN: No rashes or lesions    LABS:                        9.9    3.23  )-----------( 241      ( 15 Oct 2018 05:55 )             30.1     10-15    141  |  104  |  3<L>  ----------------------------<  160<H>  3.6   |  25  |  0.42<L>    Ca    8.4      15 Oct 2018 05:55  Phos  4.3     10-15  Mg     2.1     10-15                RADIOLOGY & ADDITIONAL TESTS:    Imaging Personally Reviewed:    Consultant(s) Notes Reviewed:      Care Discussed with Consultants/Other Providers: HPI:  HPI: 53M s/p R1 resection [Whipple 11/2017] for T2N2M0 adenocarcinoma of the pancreas followed by adjuvant CRT w/Capecitabine / Gemcitabine who presents with recurrent nausea, vomiting and CT imaging showing a stable dilated proximal jejunum of Timmy limb. Finished CRT in May/2018 without evidence of recurrent disease. He underwent small bowel study showing passage of the contrast to the large intestine, however, he had a stricture at the J-J. His surgeon advised him to take 6 small meals instead of 3 meals. His symptoms slightly improved but he continues to have distension and nausea, which improves after vomiting. A CT scan of the abdomen performed yesterday showed bowel obstruction at the J-J anastomosis with marked dilatation of the small bowel.        The patient is in agreement with the plan for surgery. Only complaints remain is after oral intake, had emeisis. Has not had a BM or nor has he been passing gas as well. The patient denies any chest pain, SOB, dizziness or light headness Only other medical issues besides the pancreatic cancer has been type 2 diabetes and GERD. No history of heart disease or lung disease in the past. The patient has never had a stress test in the past as well.       Medical and Surgical History  Type 2 Diabetes   Pancreatic cancer s/p Whipple    Review of Systems:   General: denies weight change, fever or fatigue  HEENT: denies sore throat, hoarseness  Respiratory: denies cough, shortness of breath at rest and on exertion, wheezing  Cardiovascular: denies chest pain, abnormal heart rhythm, PND, palpitations  Gastrointestinal: resolved nausea and vomiting; denies diarrhea, bloody or black bowel movements  Genitourinary: denies frequent urination, painful urination, kidney disease         Social History  Smoking Hx: denies  Etoh Hx: denies  IVDA Hx: denies     Objective:   Vital Signs Last 24 Hrs  T(C): 36.6 (11 Oct 2018 21:23), Max: 36.9 (11 Oct 2018 15:59)  T(F): 97.8 (11 Oct 2018 21:23), Max: 98.5 (11 Oct 2018 15:59)  HR: 60 (11 Oct 2018 21:23) (60 - 78)  BP: 107/64 (11 Oct 2018 21:23) (99/69 - 107/64)  BP(mean): --  RR: 16 (11 Oct 2018 21:23) (16 - 16)  SpO2: 100% (11 Oct 2018 21:23) (100% - 100%)    Physical Exam:  General: Well developed, well nourished,   and appears to be in no acute distress.  HEENT: normocephalic   Neck: Neck supple, non-tender without lymphadenopathy, masses or thyromegaly  Chest: lungs clear bilaterally, non-labored breathing, no wheezing or rhonci  Cardiac: Regular rhythm, rate of 66  Abdomen: soft, non-distended, non-tender, no guarding or rebound  Extremities: No significant deformity or joint abnormality. No edema. Peripheral pulses intact. No varicosities.       LABS:                        12.0   4.32  )-----------( 320      ( 11 Oct 2018 17:17 )             36.3     10-11    133<L>  |  96<L>  |  4<L>  ----------------------------<  139<H>  4.3   |  22  |  0.40<L>    Ca    8.9      11 Oct 2018 17:17  Phos  2.5     10-11  Mg     2.2     10-11    TPro  7.6  /  Alb  4.1  /  TBili  0.4  /  DBili  x   /  AST  46<H>  /  ALT  41  /  AlkPhos  177<H>  10-11 (12 Oct 2018 13:24)      PAST MEDICAL & SURGICAL HISTORY:  Pancreatic cancer      Review of Systems:   CONSTITUTIONAL: No fever, weight loss, or fatigue  EYES: No eye pain, visual disturbances, or discharge  ENMT:  No difficulty hearing, tinnitus, vertigo; No sinus or throat pain  RESPIRATORY: No cough, wheezing, chills or hemoptysis; No shortness of breath  CARDIOVASCULAR: No chest pain, palpitations, dizziness, or leg swelling  GASTROINTESTINAL: No abdominal or epigastric pain. No nausea or hematemesis; No diarrhea or constipation. No melena or hematochezia. + vomiting and constipation  GENITOURINARY: No dysuria, frequency, hematuria, or incontinence  NEUROLOGICAL: No headaches, memory loss, loss of strength, numbness, or tremors  SKIN: No itching, burning, rashes, or lesions   ENDOCRINE: No heat or cold intolerance; No hair loss  MUSCULOSKELETAL: No joint pain or swelling; No muscle, back, or extremity pain  PSYCHIATRIC: No depression, anxiety, mood swings, or difficulty sleeping  HEME/LYMPH: No easy bruising, or bleeding gums  ALLERY AND IMMUNOLOGIC: No hives or eczema    Allergies    No Known Allergies    Intolerances        Social History:     FAMILY HISTORY:  No pertinent family history in first degree relatives      MEDICATIONS  (STANDING):  dextrose 5% + sodium chloride 0.9%. 1000 milliLiter(s) (75 mL/Hr) IV Continuous <Continuous>  enoxaparin Injectable 40 milliGRAM(s) SubCutaneous daily  influenza   Vaccine 0.5 milliLiter(s) IntraMuscular once  insulin lispro (HumaLOG) corrective regimen sliding scale   SubCutaneous every 6 hours  pantoprazole    Tablet 40 milliGRAM(s) Oral before breakfast    MEDICATIONS  (PRN):        CAPILLARY BLOOD GLUCOSE      POCT Blood Glucose.: 143 mg/dL (15 Oct 2018 07:11)  POCT Blood Glucose.: 120 mg/dL (15 Oct 2018 02:06)  POCT Blood Glucose.: 184 mg/dL (14 Oct 2018 17:54)    I&O's Summary    14 Oct 2018 07:01  -  15 Oct 2018 07:00  --------------------------------------------------------  IN: 1920 mL / OUT: 950 mL / NET: 970 mL    15 Oct 2018 07:01  -  15 Oct 2018 12:03  --------------------------------------------------------  IN: 375 mL / OUT: 320 mL / NET: 55 mL        PHYSICAL EXAM:  GENERAL: NAD, cacheitic   EYES: EOMI, PERRLA, conjunctiva and sclera clear  NECK: Supple, No JVD  CHEST/LUNG: Clear to auscultation bilaterally; No wheeze  HEART: Regular rate and rhythm; No murmurs, rubs, or gallops  ABDOMEN: Soft, Nontender, Nondistended;  hypoactive bowel sounds   EXTREMITIES:  2+ Peripheral Pulses, No clubbing, cyanosis, or edema  PSYCH: AAOx3  NEUROLOGY: non-focal  SKIN: No rashes or lesions    LABS:                        9.9    3.23  )-----------( 241      ( 15 Oct 2018 05:55 )             30.1     10-15    141  |  104  |  3<L>  ----------------------------<  160<H>  3.6   |  25  |  0.42<L>    Ca    8.4      15 Oct 2018 05:55  Phos  4.3     10-15  Mg     2.1     10-15              RADIOLOGY & ADDITIONAL TESTS:    Imaging Personally Reviewed:    < from: CT Abdomen and Pelvis w/ Oral Cont and w/ IV Cont (10.11.18 @ 19:56) >  IMPRESSION:     Status post Timmy-en-Y gastrectomy. There is marked distention of the   gastrojejunal limb to the level of the distal anastomosis, concerning for   obstruction.    Nonspecific bladder wall thickening.    < end of copied text >      Consultant(s) Notes Reviewed:      Care Discussed with Consultants/Other Providers: HPI:  HPI: 53M s/p R1 resection [Whipple 11/2017] for T2N2M0 adenocarcinoma of the pancreas followed by adjuvant CRT w/Capecitabine / Gemcitabine who presents with recurrent nausea, vomiting and CT imaging showing a stable dilated proximal jejunum of Timmy limb. Finished CRT in May/2018 without evidence of recurrent disease. He underwent small bowel study showing passage of the contrast to the large intestine, however, he had a stricture at the J-J. His surgeon advised him to take 6 small meals instead of 3 meals. His symptoms slightly improved but he continues to have distension and nausea, which improves after vomiting. A CT scan of the abdomen performed yesterday showed bowel obstruction at the J-J anastomosis with marked dilatation of the small bowel.        The patient is in agreement with the plan for surgery. Only complaints remain is after oral intake, had emeisis. Has not had a BM or nor has he been passing gas as well. The patient denies any chest pain, SOB, dizziness or light headness Only other medical issues besides the pancreatic cancer has been type 2 diabetes and GERD. No history of heart disease or lung disease in the past. The patient has never had a stress test in the past as well.       Medical and Surgical History  Type 2 Diabetes   Pancreatic cancer s/p Whipple    Review of Systems:   General: denies weight change, fever or fatigue  HEENT: denies sore throat, hoarseness  Respiratory: denies cough, shortness of breath at rest and on exertion, wheezing  Cardiovascular: denies chest pain, abnormal heart rhythm, PND, palpitations  Gastrointestinal: resolved nausea and vomiting; denies diarrhea, bloody or black bowel movements  Genitourinary: denies frequent urination, painful urination, kidney disease         Social History  Smoking Hx: denies  Etoh Hx: denies  IVDA Hx: denies     Objective:   Vital Signs Last 24 Hrs  T(C): 36.6 (11 Oct 2018 21:23), Max: 36.9 (11 Oct 2018 15:59)  T(F): 97.8 (11 Oct 2018 21:23), Max: 98.5 (11 Oct 2018 15:59)  HR: 60 (11 Oct 2018 21:23) (60 - 78)  BP: 107/64 (11 Oct 2018 21:23) (99/69 - 107/64)  BP(mean): --  RR: 16 (11 Oct 2018 21:23) (16 - 16)  SpO2: 100% (11 Oct 2018 21:23) (100% - 100%)    Physical Exam:  General: Well developed, well nourished,   and appears to be in no acute distress.  HEENT: normocephalic   Neck: Neck supple, non-tender without lymphadenopathy, masses or thyromegaly  Chest: lungs clear bilaterally, non-labored breathing, no wheezing or rhonci  Cardiac: Regular rhythm, rate of 66  Abdomen: soft, non-distended, non-tender, no guarding or rebound  Extremities: No significant deformity or joint abnormality. No edema. Peripheral pulses intact. No varicosities.       LABS:                        12.0   4.32  )-----------( 320      ( 11 Oct 2018 17:17 )             36.3     10-11    133<L>  |  96<L>  |  4<L>  ----------------------------<  139<H>  4.3   |  22  |  0.40<L>    Ca    8.9      11 Oct 2018 17:17  Phos  2.5     10-11  Mg     2.2     10-11    TPro  7.6  /  Alb  4.1  /  TBili  0.4  /  DBili  x   /  AST  46<H>  /  ALT  41  /  AlkPhos  177<H>  10-11 (12 Oct 2018 13:24)      PAST MEDICAL & SURGICAL HISTORY:  Pancreatic cancer      Review of Systems:   CONSTITUTIONAL: No fever, weight loss, or fatigue  EYES: No eye pain, visual disturbances, or discharge  ENMT:  No difficulty hearing, tinnitus, vertigo; No sinus or throat pain  RESPIRATORY: No cough, wheezing, chills or hemoptysis; No shortness of breath  CARDIOVASCULAR: No chest pain, palpitations, dizziness, or leg swelling  GASTROINTESTINAL: No abdominal or epigastric pain. No nausea or hematemesis; No diarrhea or constipation. No melena or hematochezia. + vomiting and constipation  GENITOURINARY: No dysuria, frequency, hematuria, or incontinence  NEUROLOGICAL: No headaches, memory loss, loss of strength, numbness, or tremors  SKIN: No itching, burning, rashes, or lesions   ENDOCRINE: No heat or cold intolerance; No hair loss  MUSCULOSKELETAL: No joint pain or swelling; No muscle, back, or extremity pain  PSYCHIATRIC: No depression, anxiety, mood swings, or difficulty sleeping  HEME/LYMPH: No easy bruising, or bleeding gums  ALLERY AND IMMUNOLOGIC: No hives or eczema    Allergies    No Known Allergies    Intolerances        Social History:     FAMILY HISTORY:  No pertinent family history in first degree relatives      MEDICATIONS  (STANDING):  dextrose 5% + sodium chloride 0.9%. 1000 milliLiter(s) (75 mL/Hr) IV Continuous <Continuous>  enoxaparin Injectable 40 milliGRAM(s) SubCutaneous daily  influenza   Vaccine 0.5 milliLiter(s) IntraMuscular once  insulin lispro (HumaLOG) corrective regimen sliding scale   SubCutaneous every 6 hours  pantoprazole    Tablet 40 milliGRAM(s) Oral before breakfast    MEDICATIONS  (PRN):        CAPILLARY BLOOD GLUCOSE      POCT Blood Glucose.: 143 mg/dL (15 Oct 2018 07:11)  POCT Blood Glucose.: 120 mg/dL (15 Oct 2018 02:06)  POCT Blood Glucose.: 184 mg/dL (14 Oct 2018 17:54)    I&O's Summary    14 Oct 2018 07:01  -  15 Oct 2018 07:00  --------------------------------------------------------  IN: 1920 mL / OUT: 950 mL / NET: 970 mL    15 Oct 2018 07:01  -  15 Oct 2018 12:03  --------------------------------------------------------  IN: 375 mL / OUT: 320 mL / NET: 55 mL        PHYSICAL EXAM:  GENERAL: NAD, cacheitic   EYES: EOMI, PERRLA, conjunctiva and sclera clear  NECK: Supple, No JVD  CHEST/LUNG: Clear to auscultation bilaterally; No wheeze  HEART: Regular rate and rhythm; No murmurs, rubs, or gallops  ABDOMEN: Soft, Nontender, Nondistended;  hypoactive bowel sounds   EXTREMITIES:  2+ Peripheral Pulses, No clubbing, cyanosis, or edema  PSYCH: AAOx3  NEUROLOGY: non-focal  SKIN: No rashes or lesions    LABS:                        9.9    3.23  )-----------( 241      ( 15 Oct 2018 05:55 )             30.1     10-15    141  |  104  |  3<L>  ----------------------------<  160<H>  3.6   |  25  |  0.42<L>    Ca    8.4      15 Oct 2018 05:55  Phos  4.3     10-15  Mg     2.1     10-15        EKG; Sinus bradycarida, no ST elevations or depressions or TWI.       RADIOLOGY & ADDITIONAL TESTS:    Imaging Personally Reviewed:    < from: CT Abdomen and Pelvis w/ Oral Cont and w/ IV Cont (10.11.18 @ 19:56) >  IMPRESSION:     Status post Timmy-en-Y gastrectomy. There is marked distention of the   gastrojejunal limb to the level of the distal anastomosis, concerning for   obstruction.    Nonspecific bladder wall thickening.    < end of copied text >      Consultant(s) Notes Reviewed:      Care Discussed with Consultants/Other Providers:

## 2018-10-15 NOTE — PROGRESS NOTE ADULT - ASSESSMENT
53M s/p R1 resection [Whipple 11/2017] for T2N2M0 adenocarcinoma of the pancreas followed by adjuvant CRT w/Capecitabine / Gemcitabine who presented with chronic intermittent nausea, vomiting and CT imaging showing a dilated proximal jejunum of Timmy limb secondary to structure at the J-J.    - Full liquid diet  - Pain control  - OOB  - DVT ppx with lovenox  - Awaiting patient decision regarding whether he would like to proceed with an operation or f/u as an outpatient    Garcia Hines, PGY-2  D Team Surgery [id number]

## 2018-10-15 NOTE — CONSULT NOTE ADULT - ATTENDING COMMENTS
Agree with Housestaff Note Above, edits made where appropriate, case discussed with housestaff    Patient seen and examined. This is a 53M with pancreatic cancer s/p chemo, RT, and Whipples procedure in [facility]. Admitted for SBO, patient and family agreeing to OR revision of prior intestinal surgery.  VSS, PE as above, labs and imaging and EKG reviewed  - Patient with RCRI 2/6, low-moderate risk for moderate risk procedure  - EKG shows sinus bradycardia, no ischemic changes - asymptomatic, no dyspnea or chest pain  - Strict glycemic control for post-op wound healing   - Oncology follow up

## 2018-10-15 NOTE — PROGRESS NOTE ADULT - SUBJECTIVE AND OBJECTIVE BOX
Surgery Progress Note    S: Patient seen and examined. No acute events overnight. Patient reports having at least 5-6 BMs since yesterday morning.    O:  Vital Signs Last 24 Hrs  T(C): 36.3 (15 Oct 2018 01:36), Max: 36.9 (14 Oct 2018 22:24)  T(F): 97.4 (15 Oct 2018 01:36), Max: 98.4 (14 Oct 2018 22:24)  HR: 52 (15 Oct 2018 01:36) (52 - 62)  BP: 94/54 (15 Oct 2018 01:36) (91/65 - 100/54)  BP(mean): --  RR: 17 (15 Oct 2018 01:36) (17 - 17)  SpO2: 100% (15 Oct 2018 01:36) (100% - 100%)    I&O's Detail    13 Oct 2018 07:01  -  14 Oct 2018 07:00  --------------------------------------------------------  IN:    dextrose 5% + sodium chloride 0.9%.: 500 mL    Lactated Ringers IV Bolus: 1000 mL  Total IN: 1500 mL    OUT:    Voided: 300 mL  Total OUT: 300 mL    Total NET: 1200 mL      14 Oct 2018 07:01  -  15 Oct 2018 06:09  --------------------------------------------------------  IN:    dextrose 5% + sodium chloride 0.9%.: 1300 mL    Oral Fluid: 620 mL  Total IN: 1920 mL    OUT:    Voided: 950 mL  Total OUT: 950 mL    Total NET: 970 mL          MEDICATIONS  (STANDING):  dextrose 5% + sodium chloride 0.9%. 1000 milliLiter(s) (75 mL/Hr) IV Continuous <Continuous>  enoxaparin Injectable 40 milliGRAM(s) SubCutaneous daily  influenza   Vaccine 0.5 milliLiter(s) IntraMuscular once  insulin lispro (HumaLOG) corrective regimen sliding scale   SubCutaneous every 6 hours  pantoprazole  Injectable 40 milliGRAM(s) IV Push daily    MEDICATIONS  (PRN):          10-13    139  |  103  |  4<L>  ----------------------------<  107<H>  4.1   |  24  |  0.46<L>    Ca    8.4      13 Oct 2018 06:35        Physical Exam:  Gen: Laying in bed, NAD  Resp: Unlabored breathing  Abd: soft, NTND  Ext: WWP  Skin: No rashes

## 2018-10-15 NOTE — CONSULT NOTE ADULT - ASSESSMENT
53 y.o. Male s/p R1 resection [Whipple 11/2017] for T2N2M0 adenocarcinoma of the pancreas followed by adjuvant CRT w/Capecitabine / Gemcitabine a dilated proximal jejunum of Timmy limb secondary to structure at the J-J consistent with Small bowel obstruction. Medicine consulted pre-op optimazation    - The patient has low to moderate risk for this moderate risk procedure   - Able to complete METS >4  - RICI score of 2, patient has a 6.6% of a major cardiac event during the surgery  - Strict glycemic control both pre and post op to help wound healing     ALL RECOMMENDATIONS ARE NOT FINAL UNTIL SEEN AND CO SIGNED BY THE ATTENDING 53 y.o. Male s/p R1 resection [Whipple 11/2017] for T2N2M0 adenocarcinoma of the pancreas followed by adjuvant CRT w/Capecitabine / Gemcitabine a dilated proximal jejunum of Timmy limb secondary to structure at the J-J consistent with Small bowel obstruction. Medicine consulted pre-op optimazation

## 2018-10-16 ENCOUNTER — TRANSCRIPTION ENCOUNTER (OUTPATIENT)
Age: 53
End: 2018-10-16

## 2018-10-16 LAB
ALBUMIN SERPL ELPH-MCNC: 3.1 G/DL — LOW (ref 3.3–5)
ALP SERPL-CCNC: 148 U/L — HIGH (ref 40–120)
ALT FLD-CCNC: 32 U/L — SIGNIFICANT CHANGE UP (ref 4–41)
APPEARANCE UR: CLEAR — SIGNIFICANT CHANGE UP
APTT BLD: 34.2 SEC — SIGNIFICANT CHANGE UP (ref 27.5–37.4)
AST SERPL-CCNC: 42 U/L — HIGH (ref 4–40)
BASOPHILS # BLD AUTO: 0.03 K/UL — SIGNIFICANT CHANGE UP (ref 0–0.2)
BASOPHILS NFR BLD AUTO: 1 % — SIGNIFICANT CHANGE UP (ref 0–2)
BILIRUB SERPL-MCNC: 0.2 MG/DL — SIGNIFICANT CHANGE UP (ref 0.2–1.2)
BILIRUB UR-MCNC: NEGATIVE — SIGNIFICANT CHANGE UP
BLD GP AB SCN SERPL QL: NEGATIVE — SIGNIFICANT CHANGE UP
BLOOD UR QL VISUAL: NEGATIVE — SIGNIFICANT CHANGE UP
BUN SERPL-MCNC: 4 MG/DL — LOW (ref 7–23)
CALCIUM SERPL-MCNC: 8.5 MG/DL — SIGNIFICANT CHANGE UP (ref 8.4–10.5)
CHLORIDE SERPL-SCNC: 102 MMOL/L — SIGNIFICANT CHANGE UP (ref 98–107)
CO2 SERPL-SCNC: 26 MMOL/L — SIGNIFICANT CHANGE UP (ref 22–31)
COLOR SPEC: SIGNIFICANT CHANGE UP
CREAT SERPL-MCNC: 0.43 MG/DL — LOW (ref 0.5–1.3)
EOSINOPHIL # BLD AUTO: 0.13 K/UL — SIGNIFICANT CHANGE UP (ref 0–0.5)
EOSINOPHIL NFR BLD AUTO: 4.5 % — SIGNIFICANT CHANGE UP (ref 0–6)
GLUCOSE SERPL-MCNC: 164 MG/DL — HIGH (ref 70–99)
GLUCOSE UR-MCNC: 200 — HIGH
HCT VFR BLD CALC: 32.6 % — LOW (ref 39–50)
HGB BLD-MCNC: 10.8 G/DL — LOW (ref 13–17)
IMM GRANULOCYTES # BLD AUTO: 0.01 # — SIGNIFICANT CHANGE UP
IMM GRANULOCYTES NFR BLD AUTO: 0.3 % — SIGNIFICANT CHANGE UP (ref 0–1.5)
INR BLD: 1.1 — SIGNIFICANT CHANGE UP (ref 0.88–1.17)
KETONES UR-MCNC: NEGATIVE — SIGNIFICANT CHANGE UP
LEUKOCYTE ESTERASE UR-ACNC: NEGATIVE — SIGNIFICANT CHANGE UP
LYMPHOCYTES # BLD AUTO: 0.81 K/UL — LOW (ref 1–3.3)
LYMPHOCYTES # BLD AUTO: 28 % — SIGNIFICANT CHANGE UP (ref 13–44)
MAGNESIUM SERPL-MCNC: 2.4 MG/DL — SIGNIFICANT CHANGE UP (ref 1.6–2.6)
MCHC RBC-ENTMCNC: 27.8 PG — SIGNIFICANT CHANGE UP (ref 27–34)
MCHC RBC-ENTMCNC: 33.1 % — SIGNIFICANT CHANGE UP (ref 32–36)
MCV RBC AUTO: 83.8 FL — SIGNIFICANT CHANGE UP (ref 80–100)
MONOCYTES # BLD AUTO: 0.39 K/UL — SIGNIFICANT CHANGE UP (ref 0–0.9)
MONOCYTES NFR BLD AUTO: 13.5 % — SIGNIFICANT CHANGE UP (ref 2–14)
NEUTROPHILS # BLD AUTO: 1.52 K/UL — LOW (ref 1.8–7.4)
NEUTROPHILS NFR BLD AUTO: 52.7 % — SIGNIFICANT CHANGE UP (ref 43–77)
NITRITE UR-MCNC: NEGATIVE — SIGNIFICANT CHANGE UP
NRBC # FLD: 0 — SIGNIFICANT CHANGE UP
PH UR: 7 — SIGNIFICANT CHANGE UP (ref 5–8)
PHOSPHATE SERPL-MCNC: 4.8 MG/DL — HIGH (ref 2.5–4.5)
PLATELET # BLD AUTO: 262 K/UL — SIGNIFICANT CHANGE UP (ref 150–400)
PMV BLD: 10.6 FL — SIGNIFICANT CHANGE UP (ref 7–13)
POTASSIUM SERPL-MCNC: 3.9 MMOL/L — SIGNIFICANT CHANGE UP (ref 3.5–5.3)
POTASSIUM SERPL-SCNC: 3.9 MMOL/L — SIGNIFICANT CHANGE UP (ref 3.5–5.3)
PROT SERPL-MCNC: 5.9 G/DL — LOW (ref 6–8.3)
PROT UR-MCNC: NEGATIVE — SIGNIFICANT CHANGE UP
PROTHROM AB SERPL-ACNC: 12.7 SEC — SIGNIFICANT CHANGE UP (ref 9.8–13.1)
RBC # BLD: 3.89 M/UL — LOW (ref 4.2–5.8)
RBC # FLD: 16 % — HIGH (ref 10.3–14.5)
RH IG SCN BLD-IMP: POSITIVE — SIGNIFICANT CHANGE UP
SODIUM SERPL-SCNC: 140 MMOL/L — SIGNIFICANT CHANGE UP (ref 135–145)
SP GR SPEC: 1.01 — SIGNIFICANT CHANGE UP (ref 1–1.04)
UROBILINOGEN FLD QL: NORMAL — SIGNIFICANT CHANGE UP
WBC # BLD: 2.89 K/UL — LOW (ref 3.8–10.5)
WBC # FLD AUTO: 2.89 K/UL — LOW (ref 3.8–10.5)

## 2018-10-16 PROCEDURE — 99232 SBSQ HOSP IP/OBS MODERATE 35: CPT | Mod: 57

## 2018-10-16 RX ORDER — DEXTROSE MONOHYDRATE, SODIUM CHLORIDE, AND POTASSIUM CHLORIDE 50; .745; 4.5 G/1000ML; G/1000ML; G/1000ML
1000 INJECTION, SOLUTION INTRAVENOUS
Qty: 0 | Refills: 0 | Status: DISCONTINUED | OUTPATIENT
Start: 2018-10-16 | End: 2018-10-17

## 2018-10-16 RX ORDER — SODIUM CHLORIDE 9 MG/ML
1000 INJECTION, SOLUTION INTRAVENOUS ONCE
Qty: 0 | Refills: 0 | Status: COMPLETED | OUTPATIENT
Start: 2018-10-16 | End: 2018-10-16

## 2018-10-16 RX ADMIN — DEXTROSE MONOHYDRATE, SODIUM CHLORIDE, AND POTASSIUM CHLORIDE 50 MILLILITER(S): 50; .745; 4.5 INJECTION, SOLUTION INTRAVENOUS at 02:34

## 2018-10-16 RX ADMIN — SODIUM CHLORIDE 2000 MILLILITER(S): 9 INJECTION, SOLUTION INTRAVENOUS at 01:33

## 2018-10-16 RX ADMIN — Medication 1 CAPSULE(S): at 18:36

## 2018-10-16 RX ADMIN — Medication 1 CAPSULE(S): at 09:16

## 2018-10-16 RX ADMIN — PANTOPRAZOLE SODIUM 40 MILLIGRAM(S): 20 TABLET, DELAYED RELEASE ORAL at 09:16

## 2018-10-16 RX ADMIN — ENOXAPARIN SODIUM 40 MILLIGRAM(S): 100 INJECTION SUBCUTANEOUS at 12:22

## 2018-10-16 RX ADMIN — DEXTROSE MONOHYDRATE, SODIUM CHLORIDE, AND POTASSIUM CHLORIDE 50 MILLILITER(S): 50; .745; 4.5 INJECTION, SOLUTION INTRAVENOUS at 09:16

## 2018-10-16 RX ADMIN — Medication 2: at 12:45

## 2018-10-16 RX ADMIN — DEXTROSE MONOHYDRATE, SODIUM CHLORIDE, AND POTASSIUM CHLORIDE 50 MILLILITER(S): 50; .745; 4.5 INJECTION, SOLUTION INTRAVENOUS at 12:23

## 2018-10-16 RX ADMIN — Medication 1 CAPSULE(S): at 12:23

## 2018-10-16 NOTE — PROGRESS NOTE ADULT - ASSESSMENT
53M s/p R1 resection [Whipple 11/2017] for T2N2M0 adenocarcinoma of the pancreas followed by adjuvant CRT w/Capecitabine / Gemcitabine who presented with chronic intermittent nausea, vomiting and CT imaging showing a dilated proximal jejunum of Timmy limb secondary to structure at the J-J.  - Full liquid diet  - NPO p MN for OR tomorrow   - Preop/Consent today  - Pain control  - OOB  - DVT ppx with lovenox    Garcia Hines, PGY-2  D Team Surgery [id number]

## 2018-10-16 NOTE — PROGRESS NOTE ADULT - SUBJECTIVE AND OBJECTIVE BOX
D TEAM SURGERY PROGRESS NOTE     SUBJECTIVE: Patient seen and examined at bedside on AM rounds, patient without complaints. Tolerating full liquid diet, passing flatus and admits to having bowel movements. Denies chest pain, shortness of breath, denies nausea/vomiting.     Vital Signs Last 24 Hrs  T(C): 36.4 (16 Oct 2018 08:45), Max: 36.7 (15 Oct 2018 12:06)  T(F): 97.6 (16 Oct 2018 08:45), Max: 98 (15 Oct 2018 12:06)  HR: 51 (16 Oct 2018 08:45) (50 - 73)  BP: 98/58 (16 Oct 2018 08:45) (84/52 - 102/57)  BP(mean): --  RR: 18 (16 Oct 2018 08:45) (16 - 18)  SpO2: 100% (16 Oct 2018 08:45) (99% - 100%)  I&O's Detail    15 Oct 2018 07:01  -  16 Oct 2018 07:00  --------------------------------------------------------  IN:    dextrose 5% + sodium chloride 0.9%: 450 mL  Total IN: 450 mL    OUT:    Voided: 940 mL  Total OUT: 940 mL    Total NET: -490 mL      MEDICATIONS  (STANDING):  amylase/lipase/protease  (CREON 12,000 Units) 1 Capsule(s) Oral three times a day  dextrose 5% + sodium chloride 0.45% with potassium chloride 20 mEq/L 1000 milliLiter(s) (50 mL/Hr) IV Continuous <Continuous>  enoxaparin Injectable 40 milliGRAM(s) SubCutaneous daily  influenza   Vaccine 0.5 milliLiter(s) IntraMuscular once  insulin lispro (HumaLOG) corrective regimen sliding scale   SubCutaneous Before meals and at bedtime  pantoprazole    Tablet 40 milliGRAM(s) Oral before breakfast    MEDICATIONS  (PRN):      Physical Exam  General: A&Ox3, NAD  Respiratory: Unlabored breathing  Abd: soft, NTND  Ext: WWP  Skin: No rashes    LABS:                        10.8   2.89  )-----------( 262      ( 16 Oct 2018 05:44 )             32.6     10-16    140  |  102  |  4<L>  ----------------------------<  164<H>  3.9   |  26  |  0.43<L>    Ca    8.5      16 Oct 2018 05:44  Phos  4.8     10-16  Mg     2.4     10-16    TPro  5.9<L>  /  Alb  3.1<L>  /  TBili  0.2  /  DBili  x   /  AST  42<H>  /  ALT  32  /  AlkPhos  148<H>  10-16    PT/INR - ( 16 Oct 2018 05:44 )   PT: 12.7 SEC;   INR: 1.10          PTT - ( 16 Oct 2018 05:44 )  PTT:34.2 SEC    ABO Interpretation: JACKELIN (10-16-18 @ 06:00)

## 2018-10-16 NOTE — CHART NOTE - NSCHARTNOTEFT_GEN_A_CORE
Team Surgery Preop Note    Patient is a 53y old  Male who presents with a chief complaint of s/p Whipple in 11/2017, presents with chronic intermittent vomiting (16 Oct 2018 11:19)    Diagnosis: Bowel obstruction at J-J anastomosis  Procedure: Exploratory laparotomy, small bowel resection   Surgeon: Dr. Fairbanks                           10.8   2.89  )-----------( 262      ( 16 Oct 2018 05:44 )             32.6     10-16    140  |  102  |  4<L>  ----------------------------<  164<H>  3.9   |  26  |  0.43<L>    Ca    8.5      16 Oct 2018 05:44  Phos  4.8     10-16  Mg     2.4     10-16    TPro  5.9<L>  /  Alb  3.1<L>  /  TBili  0.2  /  DBili  x   /  AST  42<H>  /  ALT  32  /  AlkPhos  148<H>  10-16    PT/INR - ( 16 Oct 2018 05:44 )   PT: 12.7 SEC;   INR: 1.10          PTT - ( 16 Oct 2018 05:44 )  PTT:34.2 SEC      [x] Type & Screen  [x] CBC  [x] BMP  [x] PT/PTT/INR  [x] Urinalysis ordered  [x] Chest X-ray  [x] EKG  [x] NPO/IVF  [x] Consent - to be obtained   [x] Clearance  [x] Added on to OR Schedule, 1pm tomorrow

## 2018-10-17 ENCOUNTER — RESULT REVIEW (OUTPATIENT)
Age: 53
End: 2018-10-17

## 2018-10-17 ENCOUNTER — APPOINTMENT (OUTPATIENT)
Dept: SURGICAL ONCOLOGY | Facility: HOSPITAL | Age: 53
End: 2018-10-17

## 2018-10-17 PROBLEM — C25.9 MALIGNANT NEOPLASM OF PANCREAS, UNSPECIFIED: Chronic | Status: ACTIVE | Noted: 2018-10-11

## 2018-10-17 LAB
APTT BLD: 39.6 SEC — HIGH (ref 27.5–37.4)
BASE EXCESS BLDA CALC-SCNC: 1.1 MMOL/L — SIGNIFICANT CHANGE UP
BLD GP AB SCN SERPL QL: NEGATIVE — SIGNIFICANT CHANGE UP
BUN SERPL-MCNC: 3 MG/DL — LOW (ref 7–23)
BUN SERPL-MCNC: 3 MG/DL — LOW (ref 7–23)
CA-I BLDA-SCNC: 1.17 MMOL/L — SIGNIFICANT CHANGE UP (ref 1.15–1.29)
CALCIUM SERPL-MCNC: 8 MG/DL — LOW (ref 8.4–10.5)
CALCIUM SERPL-MCNC: 8.6 MG/DL — SIGNIFICANT CHANGE UP (ref 8.4–10.5)
CHLORIDE SERPL-SCNC: 101 MMOL/L — SIGNIFICANT CHANGE UP (ref 98–107)
CHLORIDE SERPL-SCNC: 101 MMOL/L — SIGNIFICANT CHANGE UP (ref 98–107)
CO2 SERPL-SCNC: 23 MMOL/L — SIGNIFICANT CHANGE UP (ref 22–31)
CO2 SERPL-SCNC: 24 MMOL/L — SIGNIFICANT CHANGE UP (ref 22–31)
CREAT SERPL-MCNC: 0.37 MG/DL — LOW (ref 0.5–1.3)
CREAT SERPL-MCNC: 0.4 MG/DL — LOW (ref 0.5–1.3)
GLUCOSE BLDA-MCNC: 148 MG/DL — HIGH (ref 70–99)
GLUCOSE SERPL-MCNC: 191 MG/DL — HIGH (ref 70–99)
GLUCOSE SERPL-MCNC: 202 MG/DL — HIGH (ref 70–99)
HCO3 BLDA-SCNC: 26 MMOL/L — SIGNIFICANT CHANGE UP (ref 22–26)
HCT VFR BLD CALC: 34.9 % — LOW (ref 39–50)
HCT VFR BLDA CALC: 35.9 % — LOW (ref 39–51)
HGB BLD-MCNC: 11.8 G/DL — LOW (ref 13–17)
HGB BLDA-MCNC: 11.7 G/DL — LOW (ref 13–17)
INR BLD: 1.1 — SIGNIFICANT CHANGE UP (ref 0.88–1.17)
MAGNESIUM SERPL-MCNC: 2 MG/DL — SIGNIFICANT CHANGE UP (ref 1.6–2.6)
MAGNESIUM SERPL-MCNC: 2.4 MG/DL — SIGNIFICANT CHANGE UP (ref 1.6–2.6)
MCHC RBC-ENTMCNC: 28 PG — SIGNIFICANT CHANGE UP (ref 27–34)
MCHC RBC-ENTMCNC: 33.8 % — SIGNIFICANT CHANGE UP (ref 32–36)
MCV RBC AUTO: 82.7 FL — SIGNIFICANT CHANGE UP (ref 80–100)
NRBC # FLD: 0 — SIGNIFICANT CHANGE UP
PCO2 BLDA: 33 MMHG — LOW (ref 35–48)
PH BLDA: 7.48 PH — HIGH (ref 7.35–7.45)
PHOSPHATE SERPL-MCNC: 3.9 MG/DL — SIGNIFICANT CHANGE UP (ref 2.5–4.5)
PHOSPHATE SERPL-MCNC: 4.7 MG/DL — HIGH (ref 2.5–4.5)
PLATELET # BLD AUTO: 328 K/UL — SIGNIFICANT CHANGE UP (ref 150–400)
PMV BLD: 10 FL — SIGNIFICANT CHANGE UP (ref 7–13)
PO2 BLDA: 375 MMHG — HIGH (ref 83–108)
POTASSIUM BLDA-SCNC: 3.6 MMOL/L — SIGNIFICANT CHANGE UP (ref 3.4–4.5)
POTASSIUM SERPL-MCNC: 3.5 MMOL/L — SIGNIFICANT CHANGE UP (ref 3.5–5.3)
POTASSIUM SERPL-MCNC: 4.5 MMOL/L — SIGNIFICANT CHANGE UP (ref 3.5–5.3)
POTASSIUM SERPL-SCNC: 3.5 MMOL/L — SIGNIFICANT CHANGE UP (ref 3.5–5.3)
POTASSIUM SERPL-SCNC: 4.5 MMOL/L — SIGNIFICANT CHANGE UP (ref 3.5–5.3)
PROTHROM AB SERPL-ACNC: 12.2 SEC — SIGNIFICANT CHANGE UP (ref 9.8–13.1)
RBC # BLD: 4.22 M/UL — SIGNIFICANT CHANGE UP (ref 4.2–5.8)
RBC # FLD: 15.9 % — HIGH (ref 10.3–14.5)
RH IG SCN BLD-IMP: POSITIVE — SIGNIFICANT CHANGE UP
SAO2 % BLDA: 98.9 % — SIGNIFICANT CHANGE UP (ref 95–99)
SODIUM BLDA-SCNC: 139 MMOL/L — SIGNIFICANT CHANGE UP (ref 136–146)
SODIUM SERPL-SCNC: 137 MMOL/L — SIGNIFICANT CHANGE UP (ref 135–145)
SODIUM SERPL-SCNC: 137 MMOL/L — SIGNIFICANT CHANGE UP (ref 135–145)
WBC # BLD: 6.94 K/UL — SIGNIFICANT CHANGE UP (ref 3.8–10.5)
WBC # FLD AUTO: 6.94 K/UL — SIGNIFICANT CHANGE UP (ref 3.8–10.5)

## 2018-10-17 PROCEDURE — 43860 REVJ GSTR/JJ ANAST W/O VGTMY: CPT | Mod: 82

## 2018-10-17 PROCEDURE — 44120 REMOVAL OF SMALL INTESTINE: CPT

## 2018-10-17 PROCEDURE — 44050 REDUCE BOWEL OBSTRUCTION: CPT | Mod: 82

## 2018-10-17 PROCEDURE — 44121 REMOVAL OF SMALL INTESTINE: CPT

## 2018-10-17 PROCEDURE — 38747 REMOVE ABDOMINAL LYMPH NODES: CPT

## 2018-10-17 PROCEDURE — 43860 REVJ GSTR/JJ ANAST W/O VGTMY: CPT

## 2018-10-17 PROCEDURE — 88305 TISSUE EXAM BY PATHOLOGIST: CPT | Mod: 26

## 2018-10-17 PROCEDURE — 38747 REMOVE ABDOMINAL LYMPH NODES: CPT | Mod: 82

## 2018-10-17 PROCEDURE — 44120 REMOVAL OF SMALL INTESTINE: CPT | Mod: 82

## 2018-10-17 PROCEDURE — 88307 TISSUE EXAM BY PATHOLOGIST: CPT | Mod: 26

## 2018-10-17 PROCEDURE — 44050 REDUCE BOWEL OBSTRUCTION: CPT | Mod: 59

## 2018-10-17 RX ORDER — MORPHINE SULFATE 50 MG/1
4 CAPSULE, EXTENDED RELEASE ORAL EVERY 4 HOURS
Qty: 0 | Refills: 0 | Status: DISCONTINUED | OUTPATIENT
Start: 2018-10-17 | End: 2018-10-22

## 2018-10-17 RX ORDER — DEXTROSE MONOHYDRATE, SODIUM CHLORIDE, AND POTASSIUM CHLORIDE 50; .745; 4.5 G/1000ML; G/1000ML; G/1000ML
1000 INJECTION, SOLUTION INTRAVENOUS
Qty: 0 | Refills: 0 | Status: DISCONTINUED | OUTPATIENT
Start: 2018-10-17 | End: 2018-10-18

## 2018-10-17 RX ORDER — MORPHINE SULFATE 50 MG/1
2 CAPSULE, EXTENDED RELEASE ORAL EVERY 4 HOURS
Qty: 0 | Refills: 0 | Status: DISCONTINUED | OUTPATIENT
Start: 2018-10-17 | End: 2018-10-22

## 2018-10-17 RX ORDER — HYDROMORPHONE HYDROCHLORIDE 2 MG/ML
0.5 INJECTION INTRAMUSCULAR; INTRAVENOUS; SUBCUTANEOUS
Qty: 0 | Refills: 0 | Status: DISCONTINUED | OUTPATIENT
Start: 2018-10-17 | End: 2018-10-19

## 2018-10-17 RX ORDER — DEXTROSE 50 % IN WATER 50 %
15 SYRINGE (ML) INTRAVENOUS ONCE
Qty: 0 | Refills: 0 | Status: DISCONTINUED | OUTPATIENT
Start: 2018-10-17 | End: 2018-10-24

## 2018-10-17 RX ORDER — DEXTROSE 50 % IN WATER 50 %
12.5 SYRINGE (ML) INTRAVENOUS ONCE
Qty: 0 | Refills: 0 | Status: DISCONTINUED | OUTPATIENT
Start: 2018-10-17 | End: 2018-10-24

## 2018-10-17 RX ORDER — GLUCAGON INJECTION, SOLUTION 0.5 MG/.1ML
1 INJECTION, SOLUTION SUBCUTANEOUS ONCE
Qty: 0 | Refills: 0 | Status: DISCONTINUED | OUTPATIENT
Start: 2018-10-17 | End: 2018-10-24

## 2018-10-17 RX ORDER — DEXTROSE 50 % IN WATER 50 %
25 SYRINGE (ML) INTRAVENOUS ONCE
Qty: 0 | Refills: 0 | Status: DISCONTINUED | OUTPATIENT
Start: 2018-10-17 | End: 2018-10-24

## 2018-10-17 RX ORDER — ACETAMINOPHEN 500 MG
650 TABLET ORAL ONCE
Qty: 0 | Refills: 0 | Status: COMPLETED | OUTPATIENT
Start: 2018-10-18 | End: 2018-10-18

## 2018-10-17 RX ORDER — INSULIN LISPRO 100/ML
VIAL (ML) SUBCUTANEOUS EVERY 6 HOURS
Qty: 0 | Refills: 0 | Status: DISCONTINUED | OUTPATIENT
Start: 2018-10-17 | End: 2018-10-22

## 2018-10-17 RX ORDER — ONDANSETRON 8 MG/1
4 TABLET, FILM COATED ORAL ONCE
Qty: 0 | Refills: 0 | Status: DISCONTINUED | OUTPATIENT
Start: 2018-10-17 | End: 2018-10-19

## 2018-10-17 RX ORDER — SODIUM CHLORIDE 9 MG/ML
1000 INJECTION, SOLUTION INTRAVENOUS
Qty: 0 | Refills: 0 | Status: DISCONTINUED | OUTPATIENT
Start: 2018-10-17 | End: 2018-10-18

## 2018-10-17 RX ORDER — SODIUM CHLORIDE 9 MG/ML
1000 INJECTION, SOLUTION INTRAVENOUS ONCE
Qty: 0 | Refills: 0 | Status: COMPLETED | OUTPATIENT
Start: 2018-10-17 | End: 2018-10-17

## 2018-10-17 RX ORDER — ALBUMIN HUMAN 25 %
250 VIAL (ML) INTRAVENOUS
Qty: 0 | Refills: 0 | Status: COMPLETED | OUTPATIENT
Start: 2018-10-17 | End: 2018-10-17

## 2018-10-17 RX ORDER — ACETAMINOPHEN 500 MG
1000 TABLET ORAL ONCE
Qty: 0 | Refills: 0 | Status: COMPLETED | OUTPATIENT
Start: 2018-10-18 | End: 2018-10-18

## 2018-10-17 RX ORDER — ACETAMINOPHEN 500 MG
650 TABLET ORAL ONCE
Qty: 0 | Refills: 0 | Status: COMPLETED | OUTPATIENT
Start: 2018-10-17 | End: 2018-10-17

## 2018-10-17 RX ADMIN — SODIUM CHLORIDE 2000 MILLILITER(S): 9 INJECTION, SOLUTION INTRAVENOUS at 17:11

## 2018-10-17 RX ADMIN — PANTOPRAZOLE SODIUM 40 MILLIGRAM(S): 20 TABLET, DELAYED RELEASE ORAL at 05:36

## 2018-10-17 RX ADMIN — DEXTROSE MONOHYDRATE, SODIUM CHLORIDE, AND POTASSIUM CHLORIDE 75 MILLILITER(S): 50; .745; 4.5 INJECTION, SOLUTION INTRAVENOUS at 16:12

## 2018-10-17 RX ADMIN — Medication 1: at 18:29

## 2018-10-17 RX ADMIN — HYDROMORPHONE HYDROCHLORIDE 0.5 MILLIGRAM(S): 2 INJECTION INTRAMUSCULAR; INTRAVENOUS; SUBCUTANEOUS at 15:30

## 2018-10-17 RX ADMIN — ENOXAPARIN SODIUM 40 MILLIGRAM(S): 100 INJECTION SUBCUTANEOUS at 16:35

## 2018-10-17 RX ADMIN — Medication 500 MILLILITER(S): at 19:50

## 2018-10-17 RX ADMIN — Medication 260 MILLIGRAM(S): at 20:47

## 2018-10-17 RX ADMIN — HYDROMORPHONE HYDROCHLORIDE 0.5 MILLIGRAM(S): 2 INJECTION INTRAMUSCULAR; INTRAVENOUS; SUBCUTANEOUS at 15:45

## 2018-10-17 RX ADMIN — Medication 650 MILLIGRAM(S): at 21:02

## 2018-10-17 RX ADMIN — Medication 500 MILLILITER(S): at 19:20

## 2018-10-17 NOTE — BRIEF OPERATIVE NOTE - PROCEDURE
<<-----Click on this checkbox to enter Procedure Exploratory laparotomy  10/17/2018  with reduction of internal hernia & takedown of jejunojejunostomy with redo anastomosis  Active  AGAINES

## 2018-10-17 NOTE — PROGRESS NOTE ADULT - ASSESSMENT
53M s/p R1 resection [Whipple 11/2017] for T2N2M0 adenocarcinoma of the pancreas followed by adjuvant CRT w/Capecitabine / Gemcitabine who presented with chronic intermittent nausea, vomiting and CT imaging showing a dilated proximal jejunum of Timmy limb secondary to structure at the J-J.    - NPO for OR today  - Pain control  - OOB  - DVT ppx with lovenox

## 2018-10-17 NOTE — CHART NOTE - NSCHARTNOTEFT_GEN_A_CORE
Post Operative Note      Procedure: Exploratory laparotomy with reduction of internal hernia & takedown of jejunojejunostomy with redo anastomosis    Subjective: Patient seen and examined at bedside.  BRIGETTE since OR.  Resting comfortably.  Pain controlled.  Denies nausea/vomiting.     Objective:    T(C): 36.6 (10-17-18 @ 19:00), Max: 36.6 (10-17-18 @ 19:00)  HR: 87 (10-17-18 @ 21:15) (54 - 100)  BP: 99/57 (10-17-18 @ 21:15) (83/56 - 122/69)  RR: 15 (10-17-18 @ 21:15) (13 - 17)  SpO2: 99% (10-17-18 @ 21:15) (97% - 100%)      10-16-18 @ 07:01  -  10-17-18 @ 07:00  --------------------------------------------------------  IN: 0 mL / OUT: 2800 mL / NET: -2800 mL    10-17-18 @ 07:01  -  10-17-18 @ 21:38  --------------------------------------------------------  IN: 1950 mL / OUT: 880 mL / NET: 1070 mL        Physical Exam:  dressing intact   abdomen soft, appropriately tender  no palpable collections   NGT in place     Assessment/Plan: 53M s/p Exploratory laparotomy with reduction of internal hernia & takedown of jejunojejunostomy with redo anastomosis.    - Pain control  - NGT/NPO/IVF  - DVT ppx  - OOB

## 2018-10-17 NOTE — PROVIDER CONTACT NOTE (OTHER) - ACTION/TREATMENT ORDERED:
Albumin bolus as ordered
Jose CARDOSO made aware, states he will endorse information to Kandis Gandhi MD. Will make orders accordingly.

## 2018-10-17 NOTE — PROGRESS NOTE ADULT - SUBJECTIVE AND OBJECTIVE BOX
D TEAM SURGERY PROGRESS NOTE     SUBJECTIVE: Patient seen and examined.  No acute events overnight.  NPO since midnight.      Vital Signs Last 24 Hrs  T(C): 36.4 (17 Oct 2018 09:59), Max: 36.7 (16 Oct 2018 21:13)  T(F): 97.6 (17 Oct 2018 09:59), Max: 98.1 (16 Oct 2018 21:13)  HR: 55 (17 Oct 2018 09:59) (51 - 78)  BP: 101/67 (17 Oct 2018 09:59) (95/57 - 109/70)  BP(mean): --  RR: 16 (17 Oct 2018 09:59) (15 - 18)  SpO2: 100% (17 Oct 2018 09:59) (97% - 100%)    I&O's Summary    16 Oct 2018 07:01  -  17 Oct 2018 07:00  --------------------------------------------------------  IN: 0 mL / OUT: 2800 mL / NET: -2800 mL        MEDICATIONS  (STANDING):  amylase/lipase/protease  (CREON 12,000 Units) 1 Capsule(s) Oral three times a day  dextrose 5% + sodium chloride 0.45% with potassium chloride 20 mEq/L 1000 milliLiter(s) (50 mL/Hr) IV Continuous <Continuous>  enoxaparin Injectable 40 milliGRAM(s) SubCutaneous daily  influenza   Vaccine 0.5 milliLiter(s) IntraMuscular once  insulin lispro (HumaLOG) corrective regimen sliding scale   SubCutaneous Before meals and at bedtime  pantoprazole    Tablet 40 milliGRAM(s) Oral before breakfast    MEDICATIONS  (PRN):      Physical Exam  General: A&Ox3, NAD  Respiratory: Unlabored breathing  Abd: soft, NTND  Ext: WWP  Skin: No rashes    LABS:           CBC Full  -  ( 16 Oct 2018 05:44 )  WBC Count : 2.89 K/uL  Hemoglobin : 10.8 g/dL  Hematocrit : 32.6 %  Platelet Count - Automated : 262 K/uL  Mean Cell Volume : 83.8 fL  Mean Cell Hemoglobin : 27.8 pg  Mean Cell Hemoglobin Concentration : 33.1 %  Auto Neutrophil # : 1.52 K/uL  Auto Lymphocyte # : 0.81 K/uL  Auto Monocyte # : 0.39 K/uL  Auto Eosinophil # : 0.13 K/uL  Auto Basophil # : 0.03 K/uL  Auto Neutrophil % : 52.7 %  Auto Lymphocyte % : 28.0 %  Auto Monocyte % : 13.5 %  Auto Eosinophil % : 4.5 %  Auto Basophil % : 1.0 %      10-17    137  |  101  |  3<L>  ----------------------------<  191<H>  4.5   |  23  |  0.40<L>    Ca    8.6      17 Oct 2018 05:49  Phos  4.7     10-17  Mg     2.4     10-17    TPro  5.9<L>  /  Alb  3.1<L>  /  TBili  0.2  /  DBili  x   /  AST  42<H>  /  ALT  32  /  AlkPhos  148<H>  10-16

## 2018-10-17 NOTE — BRIEF OPERATIVE NOTE - OPERATION/FINDINGS
Exploratory laparotomy with reduction of internal hernia & takedown of jejunojejunostomy with redo anastomosis.

## 2018-10-18 LAB
APTT BLD: 33.3 SEC — SIGNIFICANT CHANGE UP (ref 27.5–37.4)
BUN SERPL-MCNC: 7 MG/DL — SIGNIFICANT CHANGE UP (ref 7–23)
CALCIUM SERPL-MCNC: 8.1 MG/DL — LOW (ref 8.4–10.5)
CHLORIDE SERPL-SCNC: 96 MMOL/L — LOW (ref 98–107)
CO2 SERPL-SCNC: 24 MMOL/L — SIGNIFICANT CHANGE UP (ref 22–31)
CREAT SERPL-MCNC: 0.35 MG/DL — LOW (ref 0.5–1.3)
GLUCOSE SERPL-MCNC: 191 MG/DL — HIGH (ref 70–99)
HCT VFR BLD CALC: 21.3 % — LOW (ref 39–50)
HCT VFR BLD CALC: 25.3 % — LOW (ref 39–50)
HCT VFR BLD CALC: 26 % — LOW (ref 39–50)
HGB BLD-MCNC: 7.3 G/DL — LOW (ref 13–17)
HGB BLD-MCNC: 8.7 G/DL — LOW (ref 13–17)
HGB BLD-MCNC: 8.9 G/DL — LOW (ref 13–17)
INR BLD: 1.19 — HIGH (ref 0.88–1.17)
MAGNESIUM SERPL-MCNC: 1.9 MG/DL — SIGNIFICANT CHANGE UP (ref 1.6–2.6)
MCHC RBC-ENTMCNC: 28.7 PG — SIGNIFICANT CHANGE UP (ref 27–34)
MCHC RBC-ENTMCNC: 28.8 PG — SIGNIFICANT CHANGE UP (ref 27–34)
MCHC RBC-ENTMCNC: 29.1 PG — SIGNIFICANT CHANGE UP (ref 27–34)
MCHC RBC-ENTMCNC: 34.2 % — SIGNIFICANT CHANGE UP (ref 32–36)
MCHC RBC-ENTMCNC: 34.3 % — SIGNIFICANT CHANGE UP (ref 32–36)
MCHC RBC-ENTMCNC: 34.4 % — SIGNIFICANT CHANGE UP (ref 32–36)
MCV RBC AUTO: 83.8 FL — SIGNIFICANT CHANGE UP (ref 80–100)
MCV RBC AUTO: 83.9 FL — SIGNIFICANT CHANGE UP (ref 80–100)
MCV RBC AUTO: 85 FL — SIGNIFICANT CHANGE UP (ref 80–100)
NRBC # FLD: 0 — SIGNIFICANT CHANGE UP
PHOSPHATE SERPL-MCNC: 4.3 MG/DL — SIGNIFICANT CHANGE UP (ref 2.5–4.5)
PLATELET # BLD AUTO: 220 K/UL — SIGNIFICANT CHANGE UP (ref 150–400)
PLATELET # BLD AUTO: 248 K/UL — SIGNIFICANT CHANGE UP (ref 150–400)
PLATELET # BLD AUTO: 275 K/UL — SIGNIFICANT CHANGE UP (ref 150–400)
PMV BLD: 10 FL — SIGNIFICANT CHANGE UP (ref 7–13)
PMV BLD: 10.4 FL — SIGNIFICANT CHANGE UP (ref 7–13)
PMV BLD: 10.7 FL — SIGNIFICANT CHANGE UP (ref 7–13)
POTASSIUM SERPL-MCNC: 4.2 MMOL/L — SIGNIFICANT CHANGE UP (ref 3.5–5.3)
POTASSIUM SERPL-SCNC: 4.2 MMOL/L — SIGNIFICANT CHANGE UP (ref 3.5–5.3)
PROTHROM AB SERPL-ACNC: 13.3 SEC — HIGH (ref 9.8–13.1)
RBC # BLD: 2.54 M/UL — LOW (ref 4.2–5.8)
RBC # BLD: 3.02 M/UL — LOW (ref 4.2–5.8)
RBC # BLD: 3.06 M/UL — LOW (ref 4.2–5.8)
RBC # FLD: 15.6 % — HIGH (ref 10.3–14.5)
RBC # FLD: 15.9 % — HIGH (ref 10.3–14.5)
RBC # FLD: 15.9 % — HIGH (ref 10.3–14.5)
SODIUM SERPL-SCNC: 133 MMOL/L — LOW (ref 135–145)
TROPONIN T, HIGH SENSITIVITY: 13 NG/L — SIGNIFICANT CHANGE UP (ref ?–14)
WBC # BLD: 7.26 K/UL — SIGNIFICANT CHANGE UP (ref 3.8–10.5)
WBC # BLD: 7.79 K/UL — SIGNIFICANT CHANGE UP (ref 3.8–10.5)
WBC # BLD: 9.64 K/UL — SIGNIFICANT CHANGE UP (ref 3.8–10.5)
WBC # FLD AUTO: 7.26 K/UL — SIGNIFICANT CHANGE UP (ref 3.8–10.5)
WBC # FLD AUTO: 7.79 K/UL — SIGNIFICANT CHANGE UP (ref 3.8–10.5)
WBC # FLD AUTO: 9.64 K/UL — SIGNIFICANT CHANGE UP (ref 3.8–10.5)

## 2018-10-18 PROCEDURE — 93010 ELECTROCARDIOGRAM REPORT: CPT

## 2018-10-18 PROCEDURE — 99232 SBSQ HOSP IP/OBS MODERATE 35: CPT | Mod: 24

## 2018-10-18 RX ORDER — PANTOPRAZOLE SODIUM 20 MG/1
40 TABLET, DELAYED RELEASE ORAL
Qty: 0 | Refills: 0 | Status: DISCONTINUED | OUTPATIENT
Start: 2018-10-18 | End: 2018-10-23

## 2018-10-18 RX ORDER — SODIUM CHLORIDE 9 MG/ML
1000 INJECTION, SOLUTION INTRAVENOUS
Qty: 0 | Refills: 0 | Status: DISCONTINUED | OUTPATIENT
Start: 2018-10-18 | End: 2018-10-19

## 2018-10-18 RX ADMIN — DEXTROSE MONOHYDRATE, SODIUM CHLORIDE, AND POTASSIUM CHLORIDE 75 MILLILITER(S): 50; .745; 4.5 INJECTION, SOLUTION INTRAVENOUS at 06:32

## 2018-10-18 RX ADMIN — Medication 2: at 06:32

## 2018-10-18 RX ADMIN — Medication 260 MILLIGRAM(S): at 10:00

## 2018-10-18 RX ADMIN — Medication 1000 MILLIGRAM(S): at 17:20

## 2018-10-18 RX ADMIN — PANTOPRAZOLE SODIUM 40 MILLIGRAM(S): 20 TABLET, DELAYED RELEASE ORAL at 18:16

## 2018-10-18 RX ADMIN — Medication 3: at 01:51

## 2018-10-18 RX ADMIN — Medication 2: at 13:59

## 2018-10-18 RX ADMIN — Medication 400 MILLIGRAM(S): at 16:53

## 2018-10-18 RX ADMIN — Medication 650 MILLIGRAM(S): at 10:30

## 2018-10-18 NOTE — PROGRESS NOTE ADULT - ASSESSMENT
53M s/p Exploratory laparotomy with reduction of internal hernia & takedown of jejunojejunostomy with redo anastomosis POD 1 with sanguineous NG tube output    - NGT/NPO/IVF  - CBC q 6 hours  - Monitor BP and HR, currently systolic in the low 90's, not tachycardiac  - BID Protonix  - DVT ppx  - OOB  - D/w D team 53M s/p Exploratory laparotomy with reduction of internal hernia & takedown of jejunojejunostomy with redo anastomosis POD 1 with sanguineous NG tube output    - NGT/NPO/IVF  - CBC @ noon, transfuse if below 7/21  - Monitor BP and HR, currently systolic in the low 90's, not tachycardiac  - BID Protonix  - Hold DVT ppx  - OOB  - Continue almonte  - D/w D team

## 2018-10-18 NOTE — PROGRESS NOTE ADULT - SUBJECTIVE AND OBJECTIVE BOX
ANESTHESIA POSTOP CHECK    53y Male POSTOP DAY 1     Vital Signs Last 24 Hrs  T(C): 36.8 (18 Oct 2018 09:38), Max: 36.9 (18 Oct 2018 06:15)  T(F): 98.3 (18 Oct 2018 09:38), Max: 98.5 (18 Oct 2018 06:15)  HR: 83 (18 Oct 2018 09:38) (67 - 100)  BP: 93/60 (18 Oct 2018 09:38) (83/56 - 106/68)  BP(mean): --  RR: 16 (18 Oct 2018 09:38) (13 - 18)  SpO2: 100% (18 Oct 2018 09:38) (97% - 100%)  I&O's Summary    17 Oct 2018 07:01  -  18 Oct 2018 07:00  --------------------------------------------------------  IN: 2165 mL / OUT: 1380 mL / NET: 785 mL        NO APPARENT ANESTHESIA COMPLICATIONS

## 2018-10-18 NOTE — PROGRESS NOTE ADULT - SUBJECTIVE AND OBJECTIVE BOX
Morning Surgical Progress Note    Patient with chest pain overnight EKG and trops unremarkable  SUBJECTIVE: Patient seen and examined at bedside with surgical team, patient without complaints. He denies chest pain and shortness of breath denies nausea and vomiting    Vital Signs Last 24 Hrs  T(C): 36.9 (18 Oct 2018 06:15), Max: 36.9 (18 Oct 2018 06:15)  T(F): 98.5 (18 Oct 2018 06:15), Max: 98.5 (18 Oct 2018 06:15)  HR: 81 (18 Oct 2018 06:15) (55 - 100)  BP: 93/58 (18 Oct 2018 06:15) (83/56 - 122/69)  BP(mean): --  RR: 18 (18 Oct 2018 06:15) (13 - 18)  SpO2: 100% (18 Oct 2018 06:15) (97% - 100%)I&O's Detail    17 Oct 2018 07:01  -  18 Oct 2018 07:00  --------------------------------------------------------  IN:    dextrose 5% + sodium chloride 0.45% with potassium chloride 20 mEq/L: 600 mL    IV PiggyBack: 565 mL    Lactated Ringers IV Bolus: 1000 mL  Total IN: 2165 mL    OUT:    Indwelling Catheter - Urethral: 1225 mL    Nasoenteral Tube: 155 mL  Total OUT: 1380 mL    Total NET: 785 mL      MEDICATIONS  (STANDING):  acetaminophen  IVPB .. 650 milliGRAM(s) IV Intermittent once  acetaminophen  IVPB .. 1000 milliGRAM(s) IV Intermittent once  dextrose 5% + sodium chloride 0.9%. 1000 milliLiter(s) (125 mL/Hr) IV Continuous <Continuous>  dextrose 5%. 1000 milliLiter(s) (50 mL/Hr) IV Continuous <Continuous>  dextrose 50% Injectable 12.5 Gram(s) IV Push once  dextrose 50% Injectable 25 Gram(s) IV Push once  dextrose 50% Injectable 25 Gram(s) IV Push once  enoxaparin Injectable 40 milliGRAM(s) SubCutaneous daily  influenza   Vaccine 0.5 milliLiter(s) IntraMuscular once  insulin lispro (HumaLOG) corrective regimen sliding scale   SubCutaneous every 6 hours  pantoprazole  Injectable 40 milliGRAM(s) IV Push two times a day    MEDICATIONS  (PRN):  dextrose 40% Gel 15 Gram(s) Oral once PRN Blood Glucose LESS THAN 70 milliGRAM(s)/deciliter  glucagon  Injectable 1 milliGRAM(s) IntraMuscular once PRN Glucose LESS THAN 70 milligrams/deciliter  HYDROmorphone  Injectable 0.5 milliGRAM(s) IV Push every 10 minutes PRN Severe Pain (7 - 10)  morphine  - Injectable 2 milliGRAM(s) IV Push every 4 hours PRN Moderate Pain (4 - 6)  morphine  - Injectable 4 milliGRAM(s) IV Push every 4 hours PRN Severe Pain (7 - 10)  ondansetron Injectable 4 milliGRAM(s) IV Push once PRN Nausea and/or Vomiting      Physical Exam  General: A&Ox3, NAD, NG tube sanguineous   Abdominal: soft midline c/d/i, appropriately tender    LABS:                        8.7    9.64  )-----------( 275      ( 18 Oct 2018 06:00 )             25.3     10-18    133<L>  |  96<L>  |  7   ----------------------------<  191<H>  4.2   |  24  |  0.35<L>    Ca    8.1<L>      18 Oct 2018 06:00  Phos  4.3     10-18  Mg     1.9     10-18      PT/INR - ( 17 Oct 2018 05:49 )   PT: 12.2 SEC;   INR: 1.10     PTT - ( 17 Oct 2018 05:49 )  PTT:39.6 SEC  Urinalysis Basic - ( 16 Oct 2018 14:02 )  Color: LIGHT YELLOW / Appearance: CLEAR / S.009 / pH: 7.0  Gluc: 200 / Ketone: NEGATIVE  / Bili: NEGATIVE / Urobili: NORMAL   Blood: NEGATIVE / Protein: NEGATIVE / Nitrite: NEGATIVE   Leuk Esterase: NEGATIVE / RBC: x / WBC x   Sq Epi: x / Non Sq Epi: x / Bacteria: x

## 2018-10-19 LAB
APTT BLD: 30 SEC — SIGNIFICANT CHANGE UP (ref 27.5–37.4)
BUN SERPL-MCNC: 6 MG/DL — LOW (ref 7–23)
CALCIUM SERPL-MCNC: 7.9 MG/DL — LOW (ref 8.4–10.5)
CHLORIDE SERPL-SCNC: 97 MMOL/L — LOW (ref 98–107)
CO2 SERPL-SCNC: 24 MMOL/L — SIGNIFICANT CHANGE UP (ref 22–31)
CREAT SERPL-MCNC: 0.34 MG/DL — LOW (ref 0.5–1.3)
GLUCOSE SERPL-MCNC: 191 MG/DL — HIGH (ref 70–99)
HCT VFR BLD CALC: 21.9 % — LOW (ref 39–50)
HCT VFR BLD CALC: 23.1 % — LOW (ref 39–50)
HGB BLD-MCNC: 7.7 G/DL — LOW (ref 13–17)
HGB BLD-MCNC: 7.9 G/DL — LOW (ref 13–17)
INR BLD: 1.09 — SIGNIFICANT CHANGE UP (ref 0.88–1.17)
MAGNESIUM SERPL-MCNC: 1.8 MG/DL — SIGNIFICANT CHANGE UP (ref 1.6–2.6)
MCHC RBC-ENTMCNC: 28.6 PG — SIGNIFICANT CHANGE UP (ref 27–34)
MCHC RBC-ENTMCNC: 29.5 PG — SIGNIFICANT CHANGE UP (ref 27–34)
MCHC RBC-ENTMCNC: 34.2 % — SIGNIFICANT CHANGE UP (ref 32–36)
MCHC RBC-ENTMCNC: 35.2 % — SIGNIFICANT CHANGE UP (ref 32–36)
MCV RBC AUTO: 83.7 FL — SIGNIFICANT CHANGE UP (ref 80–100)
MCV RBC AUTO: 83.9 FL — SIGNIFICANT CHANGE UP (ref 80–100)
NRBC # FLD: 0 — SIGNIFICANT CHANGE UP
NRBC # FLD: 0 — SIGNIFICANT CHANGE UP
PHOSPHATE SERPL-MCNC: 2.7 MG/DL — SIGNIFICANT CHANGE UP (ref 2.5–4.5)
PLATELET # BLD AUTO: 191 K/UL — SIGNIFICANT CHANGE UP (ref 150–400)
PLATELET # BLD AUTO: 197 K/UL — SIGNIFICANT CHANGE UP (ref 150–400)
PMV BLD: 10.4 FL — SIGNIFICANT CHANGE UP (ref 7–13)
PMV BLD: 10.4 FL — SIGNIFICANT CHANGE UP (ref 7–13)
POTASSIUM SERPL-MCNC: 3.6 MMOL/L — SIGNIFICANT CHANGE UP (ref 3.5–5.3)
POTASSIUM SERPL-SCNC: 3.6 MMOL/L — SIGNIFICANT CHANGE UP (ref 3.5–5.3)
PROTHROM AB SERPL-ACNC: 12.1 SEC — SIGNIFICANT CHANGE UP (ref 9.8–13.1)
RBC # BLD: 2.61 M/UL — LOW (ref 4.2–5.8)
RBC # BLD: 2.76 M/UL — LOW (ref 4.2–5.8)
RBC # FLD: 15.1 % — HIGH (ref 10.3–14.5)
RBC # FLD: 15.1 % — HIGH (ref 10.3–14.5)
SODIUM SERPL-SCNC: 131 MMOL/L — LOW (ref 135–145)
WBC # BLD: 6.34 K/UL — SIGNIFICANT CHANGE UP (ref 3.8–10.5)
WBC # BLD: 6.41 K/UL — SIGNIFICANT CHANGE UP (ref 3.8–10.5)
WBC # FLD AUTO: 6.34 K/UL — SIGNIFICANT CHANGE UP (ref 3.8–10.5)
WBC # FLD AUTO: 6.41 K/UL — SIGNIFICANT CHANGE UP (ref 3.8–10.5)

## 2018-10-19 PROCEDURE — 36430 TRANSFUSION BLD/BLD COMPNT: CPT

## 2018-10-19 RX ORDER — MAGNESIUM SULFATE 500 MG/ML
2 VIAL (ML) INJECTION ONCE
Qty: 0 | Refills: 0 | Status: COMPLETED | OUTPATIENT
Start: 2018-10-19 | End: 2018-10-19

## 2018-10-19 RX ORDER — SODIUM CHLORIDE 9 MG/ML
1000 INJECTION, SOLUTION INTRAVENOUS
Qty: 0 | Refills: 0 | Status: DISCONTINUED | OUTPATIENT
Start: 2018-10-19 | End: 2018-10-22

## 2018-10-19 RX ADMIN — PANTOPRAZOLE SODIUM 40 MILLIGRAM(S): 20 TABLET, DELAYED RELEASE ORAL at 06:31

## 2018-10-19 RX ADMIN — SODIUM CHLORIDE 125 MILLILITER(S): 9 INJECTION, SOLUTION INTRAVENOUS at 10:39

## 2018-10-19 RX ADMIN — SODIUM CHLORIDE 125 MILLILITER(S): 9 INJECTION, SOLUTION INTRAVENOUS at 18:21

## 2018-10-19 RX ADMIN — Medication 2: at 06:31

## 2018-10-19 RX ADMIN — Medication 50 GRAM(S): at 10:39

## 2018-10-19 RX ADMIN — Medication 1: at 00:48

## 2018-10-19 RX ADMIN — PANTOPRAZOLE SODIUM 40 MILLIGRAM(S): 20 TABLET, DELAYED RELEASE ORAL at 18:21

## 2018-10-19 RX ADMIN — SODIUM CHLORIDE 125 MILLILITER(S): 9 INJECTION, SOLUTION INTRAVENOUS at 06:31

## 2018-10-19 NOTE — PROGRESS NOTE ADULT - SUBJECTIVE AND OBJECTIVE BOX
Morning Surgical Progress Note    SUBJECTIVE: Patient seen and examined at bedside with surgical team, patient states he feels better. Denies flatus and bm.     Vital Signs Last 24 Hrs  T(C): 36.8 (19 Oct 2018 00:20), Max: 37.2 (18 Oct 2018 21:17)  T(F): 98.3 (19 Oct 2018 00:20), Max: 99 (18 Oct 2018 21:17)  HR: 76 (19 Oct 2018 00:20) (64 - 86)  BP: 116/64 (19 Oct 2018 00:20) (91/66 - 119/64)  BP(mean): --  RR: 18 (19 Oct 2018 00:20) (16 - 18)  SpO2: 100% (19 Oct 2018 00:20) (96% - 100%)I&O's Detail    17 Oct 2018 07:01  -  18 Oct 2018 07:00  --------------------------------------------------------  IN:    dextrose 5% + sodium chloride 0.45% with potassium chloride 20 mEq/L: 600 mL    IV PiggyBack: 565 mL    Lactated Ringers IV Bolus: 1000 mL  Total IN: 2165 mL    OUT:    Indwelling Catheter - Urethral: 1225 mL    Nasoenteral Tube: 155 mL  Total OUT: 1380 mL    Total NET: 785 mL      18 Oct 2018 07:01  -  19 Oct 2018 06:10  --------------------------------------------------------  IN:    dextrose 5% + sodium chloride 0.9%.: 1250 mL  Total IN: 1250 mL    OUT:    Indwelling Catheter - Urethral: 450 mL    Nasoenteral Tube: 275 mL  Total OUT: 725 mL    Total NET: 525 mL      MEDICATIONS  (STANDING):  dextrose 5% + sodium chloride 0.9%. 1000 milliLiter(s) (125 mL/Hr) IV Continuous <Continuous>  dextrose 50% Injectable 12.5 Gram(s) IV Push once  dextrose 50% Injectable 25 Gram(s) IV Push once  dextrose 50% Injectable 25 Gram(s) IV Push once  influenza   Vaccine 0.5 milliLiter(s) IntraMuscular once  insulin lispro (HumaLOG) corrective regimen sliding scale   SubCutaneous every 6 hours  pantoprazole  Injectable 40 milliGRAM(s) IV Push two times a day    MEDICATIONS  (PRN):  dextrose 40% Gel 15 Gram(s) Oral once PRN Blood Glucose LESS THAN 70 milliGRAM(s)/deciliter  glucagon  Injectable 1 milliGRAM(s) IntraMuscular once PRN Glucose LESS THAN 70 milligrams/deciliter  HYDROmorphone  Injectable 0.5 milliGRAM(s) IV Push every 10 minutes PRN Severe Pain (7 - 10)  morphine  - Injectable 2 milliGRAM(s) IV Push every 4 hours PRN Moderate Pain (4 - 6)  morphine  - Injectable 4 milliGRAM(s) IV Push every 4 hours PRN Severe Pain (7 - 10)  ondansetron Injectable 4 milliGRAM(s) IV Push once PRN Nausea and/or Vomiting      Physical Exam  General: A&Ox3, NAD, NG tube sanguineous   Abdominal: midline staples c/d/i no drainage, soft, appropriately tender, nondistended     LABS:                        8.9    7.26  )-----------( 220      ( 18 Oct 2018 17:10 )             26.0     10-18    133<L>  |  96<L>  |  7   ----------------------------<  191<H>  4.2   |  24  |  0.35<L>    Ca    8.1<L>      18 Oct 2018 06:00  Phos  4.3     10-18  Mg     1.9     10-18      PT/INR - ( 18 Oct 2018 11:14 )   PT: 13.3 SEC;   INR: 1.19          PTT - ( 18 Oct 2018 11:14 )  PTT:33.3 SEC Morning Surgical Progress Note    SUBJECTIVE: Patient seen and examined at bedside with surgical team, patient states he feels better. Denies flatus and bm.     Vital Signs Last 24 Hrs  T(C): 36.8 (19 Oct 2018 00:20), Max: 37.2 (18 Oct 2018 21:17)  T(F): 98.3 (19 Oct 2018 00:20), Max: 99 (18 Oct 2018 21:17)  HR: 76 (19 Oct 2018 00:20) (64 - 86)  BP: 116/64 (19 Oct 2018 00:20) (91/66 - 119/64)  BP(mean): --  RR: 18 (19 Oct 2018 00:20) (16 - 18)  SpO2: 100% (19 Oct 2018 00:20) (96% - 100%)I&O's Detail    17 Oct 2018 07:01  -  18 Oct 2018 07:00  --------------------------------------------------------  IN:    dextrose 5% + sodium chloride 0.45% with potassium chloride 20 mEq/L: 600 mL    IV PiggyBack: 565 mL    Lactated Ringers IV Bolus: 1000 mL  Total IN: 2165 mL    OUT:    Indwelling Catheter - Urethral: 1225 mL    Nasoenteral Tube: 155 mL  Total OUT: 1380 mL    Total NET: 785 mL      18 Oct 2018 07:01  -  19 Oct 2018 06:10  --------------------------------------------------------  IN:    dextrose 5% + sodium chloride 0.9%.: 1250 mL  Total IN: 1250 mL    OUT:    Indwelling Catheter - Urethral: 450 mL    Nasoenteral Tube: 275 mL  Total OUT: 725 mL    Total NET: 525 mL      MEDICATIONS  (STANDING):  dextrose 5% + sodium chloride 0.9%. 1000 milliLiter(s) (125 mL/Hr) IV Continuous <Continuous>  dextrose 50% Injectable 12.5 Gram(s) IV Push once  dextrose 50% Injectable 25 Gram(s) IV Push once  dextrose 50% Injectable 25 Gram(s) IV Push once  influenza   Vaccine 0.5 milliLiter(s) IntraMuscular once  insulin lispro (HumaLOG) corrective regimen sliding scale   SubCutaneous every 6 hours  pantoprazole  Injectable 40 milliGRAM(s) IV Push two times a day    MEDICATIONS  (PRN):  dextrose 40% Gel 15 Gram(s) Oral once PRN Blood Glucose LESS THAN 70 milliGRAM(s)/deciliter  glucagon  Injectable 1 milliGRAM(s) IntraMuscular once PRN Glucose LESS THAN 70 milligrams/deciliter  HYDROmorphone  Injectable 0.5 milliGRAM(s) IV Push every 10 minutes PRN Severe Pain (7 - 10)  morphine  - Injectable 2 milliGRAM(s) IV Push every 4 hours PRN Moderate Pain (4 - 6)  morphine  - Injectable 4 milliGRAM(s) IV Push every 4 hours PRN Severe Pain (7 - 10)  ondansetron Injectable 4 milliGRAM(s) IV Push once PRN Nausea and/or Vomiting      Physical Exam  General: A&Ox3, NAD, NG tube sanguineous   Abdominal: midline staples c/d/i no drainage, soft, appropriately tender, nondistended     LABS:                        8.9    7.26  )-----------( 220      ( 18 Oct 2018 17:10 )             26.0                           7.7    6.34  )-----------( 191      ( 19 Oct 2018 05:44 )             21.9     10-19    131<L>  |  97<L>  |  6<L>  ----------------------------<  191<H>  3.6   |  24  |  0.34<L>    Ca    7.9<L>      19 Oct 2018 05:44  Phos  2.7     10-19  Mg     1.8     10-19    PT/INR - ( 19 Oct 2018 05:44 )   PT: 12.1 SEC;   INR: 1.09     PTT - ( 19 Oct 2018 05:44 )  PTT:30.0 SEC  ABO Interpretation: A (10-17-18 @ 06:00)  ABG - ( 17 Oct 2018 13:23 )  pH, Arterial: 7.48  pH, Blood: x     /  pCO2: 33    /  pO2: 375   / HCO3: 26    / Base Excess: 1.1   /  SaO2: 98.9

## 2018-10-19 NOTE — PROGRESS NOTE ADULT - ASSESSMENT
53M s/p Exploratory laparotomy with reduction of internal hernia & takedown of jejunojejunostomy with redo anastomosis POD 1 with sanguineous NG tube output s/p 1 U PRBC and 1 FFP POD 1, with appropriate rise in crit     - NGT/NPO/IVF  - F/u am cbc  - Monitor BP and HR  - Continue BID Protonix  - Hold DVT ppx  - OOB to chair  - Continue almonte  - D/w D team 53M s/p Exploratory laparotomy with reduction of internal hernia & takedown of jejunojejunostomy with redo anastomosis POD 1 with sanguineous NG tube output s/p 1 U PRBC and 1 FFP POD 1, with appropriate rise in crit yesterday, now downtrending    - NGT/NPO/IVF  - AM Crit decreased from 26 to 22, repeat at NOON  - Monitor BP and HR  - Continue BID Protonix  - Hold DVT ppx  - OOB to chair  - Continue almonte  - D/w D team

## 2018-10-20 LAB
BLD GP AB SCN SERPL QL: NEGATIVE — SIGNIFICANT CHANGE UP
BUN SERPL-MCNC: 9 MG/DL — SIGNIFICANT CHANGE UP (ref 7–23)
CALCIUM SERPL-MCNC: 7.8 MG/DL — LOW (ref 8.4–10.5)
CHLORIDE SERPL-SCNC: 102 MMOL/L — SIGNIFICANT CHANGE UP (ref 98–107)
CO2 SERPL-SCNC: 24 MMOL/L — SIGNIFICANT CHANGE UP (ref 22–31)
CREAT SERPL-MCNC: 0.36 MG/DL — LOW (ref 0.5–1.3)
GLUCOSE SERPL-MCNC: 142 MG/DL — HIGH (ref 70–99)
HCT VFR BLD CALC: 23 % — LOW (ref 39–50)
HGB BLD-MCNC: 7.9 G/DL — LOW (ref 13–17)
MAGNESIUM SERPL-MCNC: 2.2 MG/DL — SIGNIFICANT CHANGE UP (ref 1.6–2.6)
MCHC RBC-ENTMCNC: 29.4 PG — SIGNIFICANT CHANGE UP (ref 27–34)
MCHC RBC-ENTMCNC: 34.3 % — SIGNIFICANT CHANGE UP (ref 32–36)
MCV RBC AUTO: 85.5 FL — SIGNIFICANT CHANGE UP (ref 80–100)
NRBC # FLD: 0 — SIGNIFICANT CHANGE UP
PHOSPHATE SERPL-MCNC: 2.4 MG/DL — LOW (ref 2.5–4.5)
PLATELET # BLD AUTO: 208 K/UL — SIGNIFICANT CHANGE UP (ref 150–400)
PMV BLD: 10.4 FL — SIGNIFICANT CHANGE UP (ref 7–13)
POTASSIUM SERPL-MCNC: 3.2 MMOL/L — LOW (ref 3.5–5.3)
POTASSIUM SERPL-SCNC: 3.2 MMOL/L — LOW (ref 3.5–5.3)
RBC # BLD: 2.69 M/UL — LOW (ref 4.2–5.8)
RBC # FLD: 15.1 % — HIGH (ref 10.3–14.5)
RH IG SCN BLD-IMP: POSITIVE — SIGNIFICANT CHANGE UP
SODIUM SERPL-SCNC: 137 MMOL/L — SIGNIFICANT CHANGE UP (ref 135–145)
WBC # BLD: 4.39 K/UL — SIGNIFICANT CHANGE UP (ref 3.8–10.5)
WBC # FLD AUTO: 4.39 K/UL — SIGNIFICANT CHANGE UP (ref 3.8–10.5)

## 2018-10-20 RX ORDER — POTASSIUM CHLORIDE 20 MEQ
10 PACKET (EA) ORAL
Qty: 0 | Refills: 0 | Status: COMPLETED | OUTPATIENT
Start: 2018-10-20 | End: 2018-10-20

## 2018-10-20 RX ORDER — POTASSIUM PHOSPHATE, MONOBASIC POTASSIUM PHOSPHATE, DIBASIC 236; 224 MG/ML; MG/ML
15 INJECTION, SOLUTION INTRAVENOUS ONCE
Qty: 0 | Refills: 0 | Status: COMPLETED | OUTPATIENT
Start: 2018-10-20 | End: 2018-10-20

## 2018-10-20 RX ADMIN — Medication 100 MILLIEQUIVALENT(S): at 13:20

## 2018-10-20 RX ADMIN — PANTOPRAZOLE SODIUM 40 MILLIGRAM(S): 20 TABLET, DELAYED RELEASE ORAL at 18:18

## 2018-10-20 RX ADMIN — Medication 100 MILLIEQUIVALENT(S): at 14:15

## 2018-10-20 RX ADMIN — Medication 1: at 06:05

## 2018-10-20 RX ADMIN — PANTOPRAZOLE SODIUM 40 MILLIGRAM(S): 20 TABLET, DELAYED RELEASE ORAL at 06:05

## 2018-10-20 RX ADMIN — Medication 1: at 13:26

## 2018-10-20 RX ADMIN — POTASSIUM PHOSPHATE, MONOBASIC POTASSIUM PHOSPHATE, DIBASIC 62.5 MILLIMOLE(S): 236; 224 INJECTION, SOLUTION INTRAVENOUS at 16:05

## 2018-10-20 RX ADMIN — Medication 100 MILLIEQUIVALENT(S): at 15:15

## 2018-10-20 NOTE — PROGRESS NOTE ADULT - ASSESSMENT
53M POD2 s/p Exploratory laparotomy with reduction of internal hernia & takedown of jejunojejunostomy with redo anastomosis.    - NGT clamp trial  - Monitor BP and HR  - Continue BID Protonix  - OOB to chair    Patient seen and examined with Dr. Fisher

## 2018-10-20 NOTE — PROGRESS NOTE ADULT - SUBJECTIVE AND OBJECTIVE BOX
Surgical Progress Note    SUBJECTIVE: Patient seen and examined.  No acute events overnight.  Pain controlled.     Vital Signs Last 24 Hrs  T(C): 36.7 (20 Oct 2018 09:15), Max: 37.6 (19 Oct 2018 12:46)  T(F): 98.1 (20 Oct 2018 09:15), Max: 99.7 (19 Oct 2018 12:46)  HR: 76 (20 Oct 2018 09:15) (71 - 80)  BP: 95/64 (20 Oct 2018 09:15) (95/64 - 110/68)  BP(mean): --  RR: 18 (20 Oct 2018 09:15) (16 - 18)  SpO2: 100% (20 Oct 2018 09:15) (100% - 100%)    I&O's Detail    19 Oct 2018 07:01  -  20 Oct 2018 07:00  --------------------------------------------------------  IN:    dextrose 5% + sodium chloride 0.9%: 2250 mL    dextrose 5% + sodium chloride 0.9%: 250 mL    IV PiggyBack: 50 mL  Total IN: 2550 mL    OUT:    Nasoenteral Tube: 300 mL    Voided: 1850 mL  Total OUT: 2150 mL    Total NET: 400 mL      20 Oct 2018 07:01  -  20 Oct 2018 10:54  --------------------------------------------------------  IN:    dextrose 5% + sodium chloride 0.9%: 500 mL  Total IN: 500 mL    OUT:    Nasoenteral Tube: 100 mL  Total OUT: 100 mL    Total NET: 400 mL          MEDICATIONS  (STANDING):  dextrose 5% + sodium chloride 0.9%. 1000 milliLiter(s) (125 mL/Hr) IV Continuous <Continuous>  dextrose 50% Injectable 12.5 Gram(s) IV Push once  dextrose 50% Injectable 25 Gram(s) IV Push once  dextrose 50% Injectable 25 Gram(s) IV Push once  influenza   Vaccine 0.5 milliLiter(s) IntraMuscular once  insulin lispro (HumaLOG) corrective regimen sliding scale   SubCutaneous every 6 hours  pantoprazole  Injectable 40 milliGRAM(s) IV Push two times a day    MEDICATIONS  (PRN):  dextrose 40% Gel 15 Gram(s) Oral once PRN Blood Glucose LESS THAN 70 milliGRAM(s)/deciliter  glucagon  Injectable 1 milliGRAM(s) IntraMuscular once PRN Glucose LESS THAN 70 milligrams/deciliter  HYDROmorphone  Injectable 0.5 milliGRAM(s) IV Push every 10 minutes PRN Severe Pain (7 - 10)  morphine  - Injectable 2 milliGRAM(s) IV Push every 4 hours PRN Moderate Pain (4 - 6)  morphine  - Injectable 4 milliGRAM(s) IV Push every 4 hours PRN Severe Pain (7 - 10)  ondansetron Injectable 4 milliGRAM(s) IV Push once PRN Nausea and/or Vomiting      Physical Exam  General: A&Ox3, NAD, NG tube intact  Abdominal: midline staples c/d/i no drainage, soft, appropriately tender, nondistended     LABS:                            7.9    4.39  )-----------( 208      ( 20 Oct 2018 05:45 )             23.0       10-20    137  |  102  |  9   ----------------------------<  142<H>  3.2<L>   |  24  |  0.36<L>    Ca    7.8<L>      20 Oct 2018 05:45  Phos  2.4     10-20  Mg     2.2     10-20

## 2018-10-21 LAB
BUN SERPL-MCNC: 7 MG/DL — SIGNIFICANT CHANGE UP (ref 7–23)
CALCIUM SERPL-MCNC: 8.1 MG/DL — LOW (ref 8.4–10.5)
CHLORIDE SERPL-SCNC: 104 MMOL/L — SIGNIFICANT CHANGE UP (ref 98–107)
CO2 SERPL-SCNC: 23 MMOL/L — SIGNIFICANT CHANGE UP (ref 22–31)
CREAT SERPL-MCNC: 0.36 MG/DL — LOW (ref 0.5–1.3)
GLUCOSE SERPL-MCNC: 133 MG/DL — HIGH (ref 70–99)
HCT VFR BLD CALC: 22.6 % — LOW (ref 39–50)
HGB BLD-MCNC: 7.7 G/DL — LOW (ref 13–17)
MAGNESIUM SERPL-MCNC: 2.1 MG/DL — SIGNIFICANT CHANGE UP (ref 1.6–2.6)
MCHC RBC-ENTMCNC: 29.2 PG — SIGNIFICANT CHANGE UP (ref 27–34)
MCHC RBC-ENTMCNC: 34.1 % — SIGNIFICANT CHANGE UP (ref 32–36)
MCV RBC AUTO: 85.6 FL — SIGNIFICANT CHANGE UP (ref 80–100)
NRBC # FLD: 0 — SIGNIFICANT CHANGE UP
PHOSPHATE SERPL-MCNC: 3.3 MG/DL — SIGNIFICANT CHANGE UP (ref 2.5–4.5)
PLATELET # BLD AUTO: 255 K/UL — SIGNIFICANT CHANGE UP (ref 150–400)
PMV BLD: 10.3 FL — SIGNIFICANT CHANGE UP (ref 7–13)
POTASSIUM SERPL-MCNC: 3.8 MMOL/L — SIGNIFICANT CHANGE UP (ref 3.5–5.3)
POTASSIUM SERPL-SCNC: 3.8 MMOL/L — SIGNIFICANT CHANGE UP (ref 3.5–5.3)
RBC # BLD: 2.64 M/UL — LOW (ref 4.2–5.8)
RBC # FLD: 15 % — HIGH (ref 10.3–14.5)
SODIUM SERPL-SCNC: 138 MMOL/L — SIGNIFICANT CHANGE UP (ref 135–145)
WBC # BLD: 3.28 K/UL — LOW (ref 3.8–10.5)
WBC # FLD AUTO: 3.28 K/UL — LOW (ref 3.8–10.5)

## 2018-10-21 RX ADMIN — Medication 1: at 00:57

## 2018-10-21 RX ADMIN — Medication 1: at 12:30

## 2018-10-21 RX ADMIN — PANTOPRAZOLE SODIUM 40 MILLIGRAM(S): 20 TABLET, DELAYED RELEASE ORAL at 06:21

## 2018-10-21 RX ADMIN — PANTOPRAZOLE SODIUM 40 MILLIGRAM(S): 20 TABLET, DELAYED RELEASE ORAL at 18:02

## 2018-10-21 NOTE — PROGRESS NOTE ADULT - ASSESSMENT
53M s/p Exploratory laparotomy with reduction of internal hernia & takedown of jejunojejunostomy with redo anastomosis.    - NGT clamp trial again today  - Monitor BP and HR  - pain control  - f/u labs  - Continue BID Protonix  - OOB to chair    Patient seen and examined with Dr. Fisher

## 2018-10-21 NOTE — PROGRESS NOTE ADULT - SUBJECTIVE AND OBJECTIVE BOX
Surgery Progress Note      Subjective: Patient seen and examined. No acute events overnight. He failed clamp trial yesterday. States he is passing flatus and BM.     T(C): 36.8 (10-21-18 @ 06:26), Max: 36.8 (10-21-18 @ 06:26)  HR: 68 (10-21-18 @ 06:26) (64 - 78)  BP: 100/58 (10-21-18 @ 06:26) (100/58 - 106/60)  RR: 18 (10-21-18 @ 06:26) (18 - 18)  SpO2: 100% (10-21-18 @ 06:26) (100% - 100%)      10-20-18 @ 07:01  -  10-21-18 @ 07:00  --------------------------------------------------------  IN: 2050 mL / OUT: 1200 mL / NET: 850 mL        Physical Exam:   General: NAD,   Abdominal: midline staples c/d/i no drainage, soft, appropriately tender, nondistended     Labs:                          7.7    3.28  )-----------( 255      ( 21 Oct 2018 06:32 )             22.6     10-21    138  |  104  |  7   ----------------------------<  133<H>  3.8   |  23  |  0.36<L>    Ca    8.1<L>      21 Oct 2018 06:32  Phos  3.3     10-21  Mg     2.1     10-21      Medications:     dextrose 40% Gel 15 Gram(s) Oral once PRN  dextrose 5% + sodium chloride 0.9% 1000 milliLiter(s) IV Continuous <Continuous>  dextrose 50% Injectable 12.5 Gram(s) IV Push once  dextrose 50% Injectable 25 Gram(s) IV Push once  dextrose 50% Injectable 25 Gram(s) IV Push once  glucagon  Injectable 1 milliGRAM(s) IntraMuscular once PRN  influenza   Vaccine 0.5 milliLiter(s) IntraMuscular once  insulin lispro (HumaLOG) corrective regimen sliding scale   SubCutaneous every 6 hours  morphine  - Injectable 2 milliGRAM(s) IV Push every 4 hours PRN  morphine  - Injectable 4 milliGRAM(s) IV Push every 4 hours PRN  pantoprazole  Injectable 40 milliGRAM(s) IV Push two times a day      Radiographs: No new imaging

## 2018-10-22 LAB
BUN SERPL-MCNC: 4 MG/DL — LOW (ref 7–23)
CALCIUM SERPL-MCNC: 8.3 MG/DL — LOW (ref 8.4–10.5)
CHLORIDE SERPL-SCNC: 105 MMOL/L — SIGNIFICANT CHANGE UP (ref 98–107)
CO2 SERPL-SCNC: 24 MMOL/L — SIGNIFICANT CHANGE UP (ref 22–31)
CREAT SERPL-MCNC: 0.36 MG/DL — LOW (ref 0.5–1.3)
GLUCOSE SERPL-MCNC: 100 MG/DL — HIGH (ref 70–99)
HCT VFR BLD CALC: 23.7 % — LOW (ref 39–50)
HGB BLD-MCNC: 8.1 G/DL — LOW (ref 13–17)
MAGNESIUM SERPL-MCNC: 2.1 MG/DL — SIGNIFICANT CHANGE UP (ref 1.6–2.6)
MCHC RBC-ENTMCNC: 29.3 PG — SIGNIFICANT CHANGE UP (ref 27–34)
MCHC RBC-ENTMCNC: 34.2 % — SIGNIFICANT CHANGE UP (ref 32–36)
MCV RBC AUTO: 85.9 FL — SIGNIFICANT CHANGE UP (ref 80–100)
NRBC # FLD: 0 — SIGNIFICANT CHANGE UP
PHOSPHATE SERPL-MCNC: 3.2 MG/DL — SIGNIFICANT CHANGE UP (ref 2.5–4.5)
PLATELET # BLD AUTO: 278 K/UL — SIGNIFICANT CHANGE UP (ref 150–400)
PMV BLD: 9.9 FL — SIGNIFICANT CHANGE UP (ref 7–13)
POTASSIUM SERPL-MCNC: 3.7 MMOL/L — SIGNIFICANT CHANGE UP (ref 3.5–5.3)
POTASSIUM SERPL-SCNC: 3.7 MMOL/L — SIGNIFICANT CHANGE UP (ref 3.5–5.3)
RBC # BLD: 2.76 M/UL — LOW (ref 4.2–5.8)
RBC # FLD: 14.9 % — HIGH (ref 10.3–14.5)
SODIUM SERPL-SCNC: 138 MMOL/L — SIGNIFICANT CHANGE UP (ref 135–145)
WBC # BLD: 3.27 K/UL — LOW (ref 3.8–10.5)
WBC # FLD AUTO: 3.27 K/UL — LOW (ref 3.8–10.5)

## 2018-10-22 RX ORDER — LIPASE/PROTEASE/AMYLASE 16-48-48K
1 CAPSULE,DELAYED RELEASE (ENTERIC COATED) ORAL THREE TIMES A DAY
Qty: 0 | Refills: 0 | Status: DISCONTINUED | OUTPATIENT
Start: 2018-10-22 | End: 2018-10-23

## 2018-10-22 RX ORDER — ACETAMINOPHEN 500 MG
650 TABLET ORAL EVERY 6 HOURS
Qty: 0 | Refills: 0 | Status: DISCONTINUED | OUTPATIENT
Start: 2018-10-22 | End: 2018-10-24

## 2018-10-22 RX ORDER — ATORVASTATIN CALCIUM 80 MG/1
80 TABLET, FILM COATED ORAL AT BEDTIME
Qty: 0 | Refills: 0 | Status: DISCONTINUED | OUTPATIENT
Start: 2018-10-22 | End: 2018-10-24

## 2018-10-22 RX ORDER — INSULIN LISPRO 100/ML
VIAL (ML) SUBCUTANEOUS AT BEDTIME
Qty: 0 | Refills: 0 | Status: DISCONTINUED | OUTPATIENT
Start: 2018-10-22 | End: 2018-10-24

## 2018-10-22 RX ORDER — ENOXAPARIN SODIUM 100 MG/ML
40 INJECTION SUBCUTANEOUS DAILY
Qty: 0 | Refills: 0 | Status: DISCONTINUED | OUTPATIENT
Start: 2018-10-22 | End: 2018-10-22

## 2018-10-22 RX ORDER — POTASSIUM CHLORIDE 20 MEQ
20 PACKET (EA) ORAL
Qty: 0 | Refills: 0 | Status: DISCONTINUED | OUTPATIENT
Start: 2018-10-22 | End: 2018-10-22

## 2018-10-22 RX ORDER — INSULIN LISPRO 100/ML
VIAL (ML) SUBCUTANEOUS
Qty: 0 | Refills: 0 | Status: DISCONTINUED | OUTPATIENT
Start: 2018-10-22 | End: 2018-10-24

## 2018-10-22 RX ORDER — POTASSIUM CHLORIDE 20 MEQ
10 PACKET (EA) ORAL
Qty: 0 | Refills: 0 | Status: DISCONTINUED | OUTPATIENT
Start: 2018-10-22 | End: 2018-10-22

## 2018-10-22 RX ORDER — POTASSIUM CHLORIDE 20 MEQ
10 PACKET (EA) ORAL
Qty: 0 | Refills: 0 | Status: COMPLETED | OUTPATIENT
Start: 2018-10-22 | End: 2018-10-22

## 2018-10-22 RX ADMIN — Medication 100 MILLIEQUIVALENT(S): at 17:40

## 2018-10-22 RX ADMIN — Medication 1: at 06:05

## 2018-10-22 RX ADMIN — PANTOPRAZOLE SODIUM 40 MILLIGRAM(S): 20 TABLET, DELAYED RELEASE ORAL at 17:40

## 2018-10-22 RX ADMIN — Medication 2: at 13:02

## 2018-10-22 RX ADMIN — Medication 100 MILLIEQUIVALENT(S): at 15:07

## 2018-10-22 RX ADMIN — Medication 100 MILLIEQUIVALENT(S): at 09:36

## 2018-10-22 RX ADMIN — PANTOPRAZOLE SODIUM 40 MILLIGRAM(S): 20 TABLET, DELAYED RELEASE ORAL at 06:05

## 2018-10-22 RX ADMIN — ATORVASTATIN CALCIUM 80 MILLIGRAM(S): 80 TABLET, FILM COATED ORAL at 21:45

## 2018-10-22 RX ADMIN — Medication 1 CAPSULE(S): at 13:02

## 2018-10-22 RX ADMIN — Medication 1 CAPSULE(S): at 17:42

## 2018-10-22 NOTE — PROGRESS NOTE ADULT - SUBJECTIVE AND OBJECTIVE BOX
Morning Surgical Progress Note    NG tube removed yesterday  SUBJECTIVE: Patient seen and examined at bedside with surgical team, patient without complaints this am. Denies nausea and vomiting. Passing flatus, had a bowel movement.     Vital Signs Last 24 Hrs  T(C): 36.7 (22 Oct 2018 12:17), Max: 36.7 (22 Oct 2018 12:17)  T(F): 98 (22 Oct 2018 12:17), Max: 98 (22 Oct 2018 12:17)  HR: 70 (22 Oct 2018 12:17) (55 - 72)  BP: 94/63 (22 Oct 2018 12:17) (94/63 - 107/65)  RR: 18 (22 Oct 2018 12:17) (16 - 18)  SpO2: 100% (22 Oct 2018 12:17) (100% - 100%)I&O's Detail    21 Oct 2018 07:01  -  22 Oct 2018 07:00  --------------------------------------------------------  IN:    dextrose 5% + sodium chloride 0.9%: 1000 mL  Total IN: 1000 mL    OUT:    Nasoenteral Tube: 75 mL  Total OUT: 75 mL  Total NET: 925 mL      MEDICATIONS  (STANDING):  amylase/lipase/protease  (CREON 12,000 Units) 1 Capsule(s) Oral three times a day  atorvastatin 80 milliGRAM(s) Oral at bedtime  dextrose 5% + sodium chloride 0.9% 1000 milliLiter(s) (30 mL/Hr) IV Continuous <Continuous>  dextrose 50% Injectable 12.5 Gram(s) IV Push once  dextrose 50% Injectable 25 Gram(s) IV Push once  dextrose 50% Injectable 25 Gram(s) IV Push once  influenza   Vaccine 0.5 milliLiter(s) IntraMuscular once  insulin lispro (HumaLOG) corrective regimen sliding scale   SubCutaneous three times a day before meals  insulin lispro (HumaLOG) corrective regimen sliding scale   SubCutaneous at bedtime  pantoprazole  Injectable 40 milliGRAM(s) IV Push two times a day  potassium chloride    Tablet ER 20 milliEquivalent(s) Oral every 2 hours    MEDICATIONS  (PRN):  acetaminophen   Tablet .. 650 milliGRAM(s) Oral every 6 hours PRN Mild Pain (1 - 3), Moderate Pain (4 - 6)  dextrose 40% Gel 15 Gram(s) Oral once PRN Blood Glucose LESS THAN 70 milliGRAM(s)/deciliter  glucagon  Injectable 1 milliGRAM(s) IntraMuscular once PRN Glucose LESS THAN 70 milligrams/deciliter      Physical Exam  General: A&Ox3, NAD  Abdominal: incision midline is c/d/i, no drainage, no drainage, nondistended, nontender    LABS:                        8.1    3.27  )-----------( 278      ( 22 Oct 2018 07:51 )             23.7     10-22    138  |  105  |  4<L>  ----------------------------<  100<H>  3.7   |  24  |  0.36<L>    Ca    8.3<L>      22 Oct 2018 07:51  Phos  3.2     10-22  Mg     2.1     10-22

## 2018-10-22 NOTE — PROGRESS NOTE ADULT - ASSESSMENT
53M s/p Exploratory laparotomy with reduction of internal hernia & takedown of jejunojejunostomy with redo anastomosis, bleeding post op s/p transfusion now resolved, labs stable    - Start Clears  - Monitor GI function  - pain control  - Continue BID Protonix  - OOB to chair  - [facility] is still on hold, continues SCDs  - D/w D team

## 2018-10-23 LAB
BUN SERPL-MCNC: 4 MG/DL — LOW (ref 7–23)
CALCIUM SERPL-MCNC: 8.6 MG/DL — SIGNIFICANT CHANGE UP (ref 8.4–10.5)
CHLORIDE SERPL-SCNC: 103 MMOL/L — SIGNIFICANT CHANGE UP (ref 98–107)
CO2 SERPL-SCNC: 24 MMOL/L — SIGNIFICANT CHANGE UP (ref 22–31)
CREAT SERPL-MCNC: 0.4 MG/DL — LOW (ref 0.5–1.3)
GLUCOSE SERPL-MCNC: 107 MG/DL — HIGH (ref 70–99)
HCT VFR BLD CALC: 24.4 % — LOW (ref 39–50)
HGB BLD-MCNC: 8.2 G/DL — LOW (ref 13–17)
MAGNESIUM SERPL-MCNC: 2.2 MG/DL — SIGNIFICANT CHANGE UP (ref 1.6–2.6)
MCHC RBC-ENTMCNC: 29.1 PG — SIGNIFICANT CHANGE UP (ref 27–34)
MCHC RBC-ENTMCNC: 33.6 % — SIGNIFICANT CHANGE UP (ref 32–36)
MCV RBC AUTO: 86.5 FL — SIGNIFICANT CHANGE UP (ref 80–100)
NRBC # FLD: 0 — SIGNIFICANT CHANGE UP
PHOSPHATE SERPL-MCNC: 4.1 MG/DL — SIGNIFICANT CHANGE UP (ref 2.5–4.5)
PLATELET # BLD AUTO: 335 K/UL — SIGNIFICANT CHANGE UP (ref 150–400)
PMV BLD: 10.1 FL — SIGNIFICANT CHANGE UP (ref 7–13)
POTASSIUM SERPL-MCNC: 3.9 MMOL/L — SIGNIFICANT CHANGE UP (ref 3.5–5.3)
POTASSIUM SERPL-SCNC: 3.9 MMOL/L — SIGNIFICANT CHANGE UP (ref 3.5–5.3)
RBC # BLD: 2.82 M/UL — LOW (ref 4.2–5.8)
RBC # FLD: 15.1 % — HIGH (ref 10.3–14.5)
SODIUM SERPL-SCNC: 138 MMOL/L — SIGNIFICANT CHANGE UP (ref 135–145)
WBC # BLD: 4.51 K/UL — SIGNIFICANT CHANGE UP (ref 3.8–10.5)
WBC # FLD AUTO: 4.51 K/UL — SIGNIFICANT CHANGE UP (ref 3.8–10.5)

## 2018-10-23 RX ORDER — LIPASE/PROTEASE/AMYLASE 16-48-48K
2 CAPSULE,DELAYED RELEASE (ENTERIC COATED) ORAL THREE TIMES A DAY
Qty: 0 | Refills: 0 | Status: DISCONTINUED | OUTPATIENT
Start: 2018-10-23 | End: 2018-10-24

## 2018-10-23 RX ORDER — PANTOPRAZOLE SODIUM 20 MG/1
40 TABLET, DELAYED RELEASE ORAL DAILY
Qty: 0 | Refills: 0 | Status: DISCONTINUED | OUTPATIENT
Start: 2018-10-23 | End: 2018-10-24

## 2018-10-23 RX ORDER — PANTOPRAZOLE SODIUM 20 MG/1
40 TABLET, DELAYED RELEASE ORAL
Qty: 0 | Refills: 0 | Status: DISCONTINUED | OUTPATIENT
Start: 2018-10-23 | End: 2018-10-23

## 2018-10-23 RX ADMIN — Medication 4: at 12:27

## 2018-10-23 RX ADMIN — ATORVASTATIN CALCIUM 80 MILLIGRAM(S): 80 TABLET, FILM COATED ORAL at 21:29

## 2018-10-23 RX ADMIN — Medication 2 CAPSULE(S): at 17:20

## 2018-10-23 RX ADMIN — Medication 1 CAPSULE(S): at 08:04

## 2018-10-23 RX ADMIN — PANTOPRAZOLE SODIUM 40 MILLIGRAM(S): 20 TABLET, DELAYED RELEASE ORAL at 05:22

## 2018-10-23 RX ADMIN — Medication 1 CAPSULE(S): at 12:28

## 2018-10-23 NOTE — PROGRESS NOTE ADULT - ASSESSMENT
53M s/p Exploratory laparotomy with reduction of internal hernia & takedown of jejunojejunostomy with redo anastomosis, bleeding post op s/p transfusion now resolved, labs stable, awaiting am cbc. Patient tolerating a full liquid diet.    - Advance to regular   - Monitor GI function  - pain control as needed  - Continue BID Protonix, change to po  - OOB to chair  - [facility] is still on hold, continues SCDs  - Possible discharge today  - D/w D team

## 2018-10-23 NOTE — PROGRESS NOTE ADULT - SUBJECTIVE AND OBJECTIVE BOX
Morning Surgical Progress Note    SUBJECTIVE: Patient seen and examined at bedside with surgical team, patient tolerating full liquids, patient had 3 bowel movements, he states there is a small amount of bowel with each movement, but is improving. He would like to go home.    Vital Signs Last 24 Hrs  T(C): 36.9 (23 Oct 2018 05:15), Max: 36.9 (23 Oct 2018 05:15)  T(F): 98.5 (23 Oct 2018 05:15), Max: 98.5 (23 Oct 2018 05:15)  HR: 94 (23 Oct 2018 05:15) (63 - 94)  BP: 98/73 (23 Oct 2018 05:15) (88/53 - 114/57)  RR: 17 (23 Oct 2018 05:15) (16 - 18)  SpO2: 100% (23 Oct 2018 05:15) (100% - 100%)I&O's Detail    22 Oct 2018 07:01  -  23 Oct 2018 07:00  --------------------------------------------------------  IN:  Total IN: 0 mL  OUT:    Voided: 950 mL  Total OUT: 950 mL  Total NET: -950 mL    MEDICATIONS  (STANDING):  amylase/lipase/protease  (CREON 12,000 Units) 1 Capsule(s) Oral three times a day  atorvastatin 80 milliGRAM(s) Oral at bedtime  dextrose 50% Injectable 12.5 Gram(s) IV Push once  dextrose 50% Injectable 25 Gram(s) IV Push once  dextrose 50% Injectable 25 Gram(s) IV Push once  influenza   Vaccine 0.5 milliLiter(s) IntraMuscular once  insulin lispro (HumaLOG) corrective regimen sliding scale   SubCutaneous three times a day before meals  insulin lispro (HumaLOG) corrective regimen sliding scale   SubCutaneous at bedtime  pantoprazole  Injectable 40 milliGRAM(s) IV Push two times a day  MEDICATIONS  (PRN):  acetaminophen   Tablet .. 650 milliGRAM(s) Oral every 6 hours PRN Mild Pain (1 - 3), Moderate Pain (4 - 6)  dextrose 40% Gel 15 Gram(s) Oral once PRN Blood Glucose LESS THAN 70 milliGRAM(s)/deciliter  glucagon  Injectable 1 milliGRAM(s) IntraMuscular once PRN Glucose LESS THAN 70 milligrams/deciliter      Physical Exam  General: A&Ox3, NAD  Abdominal: soft nontender and distended, midline staples are c/d/i    LABS:                        8.1    3.27  )-----------( 278      ( 22 Oct 2018 07:51 )             23.7     10-22    138  |  105  |  4<L>  ----------------------------<  100<H>  3.7   |  24  |  0.36<L>    Ca    8.3<L>      22 Oct 2018 07:51  Phos  3.2     10-22  Mg     2.1     10-22

## 2018-10-23 NOTE — CHART NOTE - NSCHARTNOTEFT_GEN_A_CORE
NUTRITION SERVICES                                                                                  MALNUTRITION ALERT     Attention Health Care Provider: Upon nutritional assessment by the Registered Dietitian your patient was determined to meet criteria / has evidence of the following diagnosis/diagnoses:    [ ] Mild Protein Calorie Malnutrition   [ ] Moderate Protein Calorie Malnutrition   [X ] Severe Protein Calorie Malnutrition   [ ] Unspecified Protein Calorie Malnutrition   [ ] Underweight / BMI <19  [ ] Morbid Obesity / BMI >40      By signing this assessment you are acknowledging the diagnosis/diagnoses.       PLAN OF CARE: Refer to Initial Dietitian Evaluation or Nutrition Follow-Up Documentation for Nutritional Recommendations.

## 2018-10-23 NOTE — DIETITIAN INITIAL EVALUATION ADULT. - OTHER INFO
Pt seen for LOS. Pt is s/p Whipple. Over the last 5 months Pt developed nausea and vomiting. Pt states that his usual weight prior to this was 145 lbs. His current weight is 102 lbs, Pt's diet was advanced today to a consistent carb diet from a consistent carb, clear liquids diet. Pt states that he ate all of his breakfast with no GI distress. Pt has no difficulty chewing or swallowing. Reviewed diet with Pt who verbalized good understanding.

## 2018-10-24 ENCOUNTER — TRANSCRIPTION ENCOUNTER (OUTPATIENT)
Age: 53
End: 2018-10-24

## 2018-10-24 VITALS
OXYGEN SATURATION: 100 % | DIASTOLIC BLOOD PRESSURE: 60 MMHG | HEART RATE: 67 BPM | TEMPERATURE: 98 F | RESPIRATION RATE: 17 BRPM | SYSTOLIC BLOOD PRESSURE: 102 MMHG

## 2018-10-24 DIAGNOSIS — E78.00 PURE HYPERCHOLESTEROLEMIA, UNSPECIFIED: ICD-10-CM

## 2018-10-24 DIAGNOSIS — E11.9 TYPE 2 DIABETES MELLITUS WITHOUT COMPLICATIONS: ICD-10-CM

## 2018-10-24 DIAGNOSIS — I10 ESSENTIAL (PRIMARY) HYPERTENSION: ICD-10-CM

## 2018-10-24 LAB
BUN SERPL-MCNC: 6 MG/DL — LOW (ref 7–23)
C PEPTIDE SERPL-MCNC: 0.9 NG/ML — SIGNIFICANT CHANGE UP (ref 0.9–7.1)
CALCIUM SERPL-MCNC: 8.4 MG/DL — SIGNIFICANT CHANGE UP (ref 8.4–10.5)
CHLORIDE SERPL-SCNC: 105 MMOL/L — SIGNIFICANT CHANGE UP (ref 98–107)
CO2 SERPL-SCNC: 26 MMOL/L — SIGNIFICANT CHANGE UP (ref 22–31)
CREAT SERPL-MCNC: 0.54 MG/DL — SIGNIFICANT CHANGE UP (ref 0.5–1.3)
GLUCOSE SERPL-MCNC: 129 MG/DL — HIGH (ref 70–99)
HCT VFR BLD CALC: 23.1 % — LOW (ref 39–50)
HGB BLD-MCNC: 7.8 G/DL — LOW (ref 13–17)
MAGNESIUM SERPL-MCNC: 2.2 MG/DL — SIGNIFICANT CHANGE UP (ref 1.6–2.6)
MCHC RBC-ENTMCNC: 29.3 PG — SIGNIFICANT CHANGE UP (ref 27–34)
MCHC RBC-ENTMCNC: 33.8 % — SIGNIFICANT CHANGE UP (ref 32–36)
MCV RBC AUTO: 86.8 FL — SIGNIFICANT CHANGE UP (ref 80–100)
NRBC # FLD: 0 — SIGNIFICANT CHANGE UP
PHOSPHATE SERPL-MCNC: 4.3 MG/DL — SIGNIFICANT CHANGE UP (ref 2.5–4.5)
PLATELET # BLD AUTO: 305 K/UL — SIGNIFICANT CHANGE UP (ref 150–400)
PMV BLD: 9.8 FL — SIGNIFICANT CHANGE UP (ref 7–13)
POTASSIUM SERPL-MCNC: 3.9 MMOL/L — SIGNIFICANT CHANGE UP (ref 3.5–5.3)
POTASSIUM SERPL-SCNC: 3.9 MMOL/L — SIGNIFICANT CHANGE UP (ref 3.5–5.3)
RBC # BLD: 2.66 M/UL — LOW (ref 4.2–5.8)
RBC # FLD: 15.1 % — HIGH (ref 10.3–14.5)
SODIUM SERPL-SCNC: 141 MMOL/L — SIGNIFICANT CHANGE UP (ref 135–145)
WBC # BLD: 2.91 K/UL — LOW (ref 3.8–10.5)
WBC # FLD AUTO: 2.91 K/UL — LOW (ref 3.8–10.5)

## 2018-10-24 PROCEDURE — 99255 IP/OBS CONSLTJ NEW/EST HI 80: CPT

## 2018-10-24 RX ORDER — PIOGLITAZONE HYDROCHLORIDE 15 MG/1
1 TABLET ORAL
Qty: 0 | Refills: 0 | COMMUNITY

## 2018-10-24 RX ORDER — CHOLECALCIFEROL (VITAMIN D3) 125 MCG
0 CAPSULE ORAL
Qty: 0 | Refills: 0 | COMMUNITY

## 2018-10-24 RX ORDER — PIOGLITAZONE HYDROCHLORIDE 15 MG/1
1 TABLET ORAL
Qty: 14 | Refills: 0 | OUTPATIENT
Start: 2018-10-24

## 2018-10-24 RX ORDER — METFORMIN HYDROCHLORIDE 850 MG/1
1 TABLET ORAL
Qty: 14 | Refills: 0 | OUTPATIENT
Start: 2018-10-24

## 2018-10-24 RX ORDER — LIPASE/PROTEASE/AMYLASE 16-48-48K
1 CAPSULE,DELAYED RELEASE (ENTERIC COATED) ORAL
Qty: 42 | Refills: 0 | OUTPATIENT
Start: 2018-10-24

## 2018-10-24 RX ORDER — OMEPRAZOLE 10 MG/1
20 CAPSULE, DELAYED RELEASE ORAL
Qty: 0 | Refills: 0 | COMMUNITY

## 2018-10-24 RX ORDER — ROSUVASTATIN CALCIUM 5 MG/1
1 TABLET ORAL
Qty: 0 | Refills: 0 | COMMUNITY

## 2018-10-24 RX ORDER — CHOLECALCIFEROL (VITAMIN D3) 125 MCG
1 CAPSULE ORAL
Qty: 14 | Refills: 0 | OUTPATIENT
Start: 2018-10-24

## 2018-10-24 RX ORDER — ROSUVASTATIN CALCIUM 5 MG/1
1 TABLET ORAL
Qty: 14 | Refills: 0 | OUTPATIENT
Start: 2018-10-24

## 2018-10-24 RX ORDER — METFORMIN HYDROCHLORIDE 850 MG/1
1 TABLET ORAL
Qty: 0 | Refills: 0 | COMMUNITY

## 2018-10-24 RX ORDER — OMEPRAZOLE 10 MG/1
20 CAPSULE, DELAYED RELEASE ORAL
Qty: 560 | Refills: 0 | OUTPATIENT
Start: 2018-10-24

## 2018-10-24 RX ORDER — METOCLOPRAMIDE HCL 10 MG
1 TABLET ORAL
Qty: 0 | Refills: 0 | COMMUNITY

## 2018-10-24 RX ORDER — ACETAMINOPHEN 500 MG
2 TABLET ORAL
Qty: 0 | Refills: 0 | COMMUNITY
Start: 2018-10-24

## 2018-10-24 RX ORDER — LIPASE/PROTEASE/AMYLASE 16-48-48K
1 CAPSULE,DELAYED RELEASE (ENTERIC COATED) ORAL
Qty: 0 | Refills: 0 | COMMUNITY

## 2018-10-24 RX ADMIN — Medication 2 CAPSULE(S): at 09:02

## 2018-10-24 RX ADMIN — Medication 2 CAPSULE(S): at 18:36

## 2018-10-24 RX ADMIN — Medication 2: at 13:27

## 2018-10-24 RX ADMIN — PANTOPRAZOLE SODIUM 40 MILLIGRAM(S): 20 TABLET, DELAYED RELEASE ORAL at 12:27

## 2018-10-24 RX ADMIN — Medication 2: at 18:35

## 2018-10-24 RX ADMIN — Medication 2 CAPSULE(S): at 12:27

## 2018-10-24 NOTE — PROGRESS NOTE ADULT - ATTENDING COMMENTS
Agree with resident assessment. Will d/w patient risks and benefits of urgent revision of presumed stricture at J-J anastomosis vs. elective intervention given his ability to tolerate a liquid diet.
Doing well. H/H stable. Tolerating diet. Will resume creon with all meals, advance to regular diet. PO protonix, D/C IV fluids.
Doing well. Tolerating liquids with full GI function. Abdomen soft. H/H stable. Will advance to full liquids now.
Patient seen and examined. Will proceed to OR tomorrow for planned revision of presumed J-J stricture. Risks, benefits, and alternatives to surgery d/w patient and family, and he wishes to proceed with surgical plan.
Patient seen in pre-op holding with family. For OR today. R/B/A again explained in detail to patient, who wishes to proceed.
Will consult endocrine and medical oncology for long-term f/u. D/C planning.
Patient seen and examined. All questions and concerns answered with family at bedside. Patient has what appears to be a staple line bleed. INR slightly elevated, H/H dropping. Will transfuse blood and FFP to correct coagulopathy. Hemodynamics stable. Will hold any chemical DVT prophylaxis and check CBC, coags later. Abdomen soft. Explained to patient and family possibility of EGD vs. return to OR if bleed does not stop, but that most staple line bleeds are self limiting.
POD #2. Improved. H/H 8/22. Hemodynamically stable. NGT output less bloody, now mostly bilious. Abdomen soft. Will continue to monitor with NG tube. Espinoza removed. Will check 1 further CBC this PM to ensure stability. Plan discussed with patient and family.

## 2018-10-24 NOTE — DISCHARGE NOTE ADULT - CARE PROVIDER_API CALL
Derek Fairbanks (MD), Surgery  [address] [place] [address]  Phone: [phone]  Fax: [phone]

## 2018-10-24 NOTE — CONSULT NOTE ADULT - PROBLEM SELECTOR RECOMMENDATION 3
-BP goal < 130/80
s/p treatment in [facility], patient to follow up outpatient with Dr. Jordon Almanza (documentation of paperwork to refer to Dr. Almanza on prior ER visit)

## 2018-10-24 NOTE — PROGRESS NOTE ADULT - SUBJECTIVE AND OBJECTIVE BOX
Surgery Progress Note    SUBJECTIVE: Pt seen and examined at bedside. Patient comfortable and in no-apparent distress. No nausea, vomiting, diarrhea. Pain is controlled.     Vital Signs Last 24 Hrs  T(C): 36.8 (24 Oct 2018 08:25), Max: 36.8 (24 Oct 2018 08:25)  T(F): 98.2 (24 Oct 2018 08:25), Max: 98.2 (24 Oct 2018 08:25)  HR: 67 (24 Oct 2018 08:25) (66 - 92)  BP: 96/58 (24 Oct 2018 08:25) (91/57 - 102/60)  BP(mean): --  RR: 18 (24 Oct 2018 08:25) (17 - 18)  SpO2: 100% (24 Oct 2018 08:25) (97% - 100%)    Physical Exam:  General: A&Ox3, NAD  Abdominal: soft nontender and distended, midline staples are c/d/i  LABS:                        7.8    2.91  )-----------( 305      ( 24 Oct 2018 05:56 )             23.1     10-24    141  |  105  |  6<L>  ----------------------------<  129<H>  3.9   |  26  |  0.54    Ca    8.4      24 Oct 2018 05:56  Phos  4.3     10-24  Mg     2.2     10-24            INs and OUTs:    10-23-18 @ 07:01  -  10-24-18 @ 07:00  --------------------------------------------------------  IN: 0 mL / OUT: 550 mL / NET: -550 mL

## 2018-10-24 NOTE — DISCHARGE NOTE ADULT - CARE PLAN
Principal Discharge DX:	Obstruction of bowel  Goal:	wound healing  Assessment and plan of treatment:	WOUND CARE: Apply clean gauze and paper tape over incision site once a day.  Keep incisions clean and dry.  Staples will be removed at your follow up appointment.    BATHING: Please do not submerge wound underwater. You may shower and/or sponge bathe. It is OK to wash drain wound site.  ACTIVITY: No heavy lifting or straining. Otherwise, you may return to your usual level of physical activity. If you are taking narcotic pain medication (such as Percocet) DO NOT drive a car, operate machinery or make important decisions.  DIET: Return to your usual diabetic diet.  NOTIFY YOUR SURGEON IF: You have any bleeding that does not stop, any pus draining from your wound(s), any fever (over 100.4 F) or chills, persistent nausea/vomiting, persistent diarrhea, or if your pain is not controlled on your discharge pain medications.  FOLLOW-UP: Please follow up with your primary care physician in one week regarding your hospitalization.  Please follow up with Dr. Fairbanks in one week.  Call [phone] to schedule an appointment.  Secondary Diagnosis:	Elevated cholesterol  Assessment and plan of treatment:	Please follow up with your primary care physician regarding your hospitalization  Secondary Diagnosis:	Diabetes  Assessment and plan of treatment:	Please follow up with your primary care physician regarding your hospitalization Principal Discharge DX:	Obstruction of bowel  Goal:	wound healing  Assessment and plan of treatment:	WOUND CARE: Apply clean gauze and paper tape over incision site once a day.  Keep incisions clean and dry.  Staples will be removed at your follow up appointment.    BATHING: Please do not submerge wound underwater. You may shower and/or sponge bathe. It is OK to wash drain wound site.  ACTIVITY: No heavy lifting or straining. Otherwise, you may return to your usual level of physical activity. If you are taking narcotic pain medication (such as Percocet) DO NOT drive a car, operate machinery or make important decisions.  DIET: Return to your usual diabetic diet.  NOTIFY YOUR SURGEON IF: You have any bleeding that does not stop, any pus draining from your wound(s), any fever (over 100.4 F) or chills, persistent nausea/vomiting, persistent diarrhea, or if your pain is not controlled on your discharge pain medications.  FOLLOW-UP: Please follow up with your primary care physician in one week regarding your hospitalization.  Please follow up with Dr. Fairbanks in one week.  Call [phone] to schedule an appointment.  Secondary Diagnosis:	Elevated cholesterol  Assessment and plan of treatment:	Please follow up with your primary care physician regarding your hospitalization  Secondary Diagnosis:	Diabetes  Assessment and plan of treatment:	Please follow up with your primary care physician regarding your hospitalization.   Please follow up at the Endocrine clinic at [facility]. Please call [phone] to schedule an appointment.  Please check blood sugar before meals and at bedtime. Please record all readings and bring to follow up appointment.

## 2018-10-24 NOTE — CONSULT NOTE ADULT - REASON FOR ADMISSION
s/p Deandre in 11/2017, presents with chronic intermittent vomiting
s/p Deandre in 11/2017, presents with chronic intermittent vomiting

## 2018-10-24 NOTE — CONSULT NOTE ADULT - PROBLEM SELECTOR RECOMMENDATION 9
-it is unclear based on his history whether he had pre-existing type 2 diabetes or whether there was pre-diabetes with exocrine pancreas dysfunction at the onset.  Can check C-peptide to determine if there is pancreatic function reserve  -His glucose remains at goal on correction sliding scale  -consistent carbohydrate diet  -the patient will need Rx for glucometer and supplies  -he currently has Medicaid so can follow up with Endocrine fellows clinic at [facility]. He can call [phone] to schedule  -for discharge: he has metformin and Actos at this time.  If he has detectable C-peptide on labs tomorrow he can go home and continue the metformin and Actos until he runs out of Actos.  Actos in the [place] may be cost-limiting.  When he runs out, recommend optimizing metformin or uptitrating to metformin XR 1000 mg daily as long as renal function is stable.  His outpatient regimen has to be finalized based on fingerstick data.  This can be done at the fellows clinic  -continue with the pancreatic enzymes
- The patient has low to moderate risk for this moderate risk procedure   - Able to complete METS >4  - RICI score of 2, patient has a 6.6% of a major cardiac event during the surgery  - Strict glycemic control both pre and post op to help wound healing

## 2018-10-24 NOTE — CONSULT NOTE ADULT - SUBJECTIVE AND OBJECTIVE BOX
HPI:  HPI: 53M s/p R1 resection [Whipple 2017] for T2N2M0 adenocarcinoma of the pancreas followed by adjuvant CRT w/Capecitabine / Gemcitabine who presents with recurrent nausea, vomiting and CT imaging showing a stable dilated proximal jejunum of Timmy limb. Finished CRT in May/2018 without evidence of recurrent disease. He underwent small bowel study showing passage of the contrast to the large intestine, however, he had a stricture at the J-J. His surgeon advised him to take 6 small meals instead of 3 meals. His symptoms slightly improved but he continues to have distension and nausea, which improves after vomiting. A CT scan of the abdomen performed yesterday showed bowel obstruction at the J-J anastomosis with marked dilatation of the small bowel.      Endocrine History  Patient is a 54 yo man with hx of pancreatic cancer s/p pancreaticoduodenectomy and diabetes admitted with bowel obstruction.  Endocrine was asked to follow for his diabetes.  He was diagnosed with diabetes in .  He was initiated on metformin.  He then developed the pancreatic cancer had surgery but was told his pancreas was functioning.  Patient received all medical care including endocrinology in [facility] where he was living. He moved to the  in September and plans on residing here.   He knows how to check a glucometer but when he left [facility], his doctor provided him with a glucose sensor.  States that his sugars are typically in the 90-100s, with the highest value being in the 160s.  His home medications including metformin  mg in AM and Actos 30 mg once a day  Last dilated eye exam was earlier this year in [facility] and there is no reported retinopathy  Diet: eggs, roti, vegetables.  He sometimes will snack on fruits and occasional cake and fruits.    Medical and Surgical History  Pancreatic cancer s/p Whipple  Type 2 diabetes    Family History:  Father  from pancreatic cancer, also had diabetes  Mother is alive and without chronic medical conditions    Social History  Smoking Hx: Former smoker, quit a few years ago  Etoh Hx: denies  IVDA Hx: denies  Lives with family-moved from [facility] to  1 month ago    Review of Systems:   General: denies weight change, fever or fatigue  HEENT: denies sore throat, hoarseness  Respiratory: denies cough, shortness of breath at rest and on exertion, wheezing  Cardiovascular: denies chest pain, abnormal heart rhythm, PND, palpitations  Gastrointestinal: resolved nausea and vomiting    Outpatient Medications:  Metformin 500 mg XR once a day  Actos 30 mg once a day  Crestor 10  Omeprazole    MEDICATIONS  (STANDING):  amylase/lipase/protease  (CREON 12,000 Units) 2 Capsule(s) Oral three times a day  atorvastatin 80 milliGRAM(s) Oral at bedtime  dextrose 50% Injectable 12.5 Gram(s) IV Push once  dextrose 50% Injectable 25 Gram(s) IV Push once  dextrose 50% Injectable 25 Gram(s) IV Push once  influenza   Vaccine 0.5 milliLiter(s) IntraMuscular once  insulin lispro (HumaLOG) corrective regimen sliding scale   SubCutaneous three times a day before meals  insulin lispro (HumaLOG) corrective regimen sliding scale   SubCutaneous at bedtime  pantoprazole    Tablet 40 milliGRAM(s) Oral daily    MEDICATIONS  (PRN):  acetaminophen   Tablet .. 650 milliGRAM(s) Oral every 6 hours PRN Mild Pain (1 - 3), Moderate Pain (4 - 6)  dextrose 40% Gel 15 Gram(s) Oral once PRN Blood Glucose LESS THAN 70 milliGRAM(s)/deciliter  glucagon  Injectable 1 milliGRAM(s) IntraMuscular once PRN Glucose LESS THAN 70 milligrams/deciliter      Allergies  No Known Allergies    Objective:   Vital Signs Last 24 Hrs  T(C): 36.6 (11 Oct 2018 21:23), Max: 36.9 (11 Oct 2018 15:59)  T(F): 97.8 (11 Oct 2018 21:23), Max: 98.5 (11 Oct 2018 15:59)  HR: 60 (11 Oct 2018 21:23) (60 - 78)  BP: 107/64 (11 Oct 2018 21:23) (99/69 - 107/64)  BP(mean): --  RR: 16 (11 Oct 2018 21:23) (16 - 16)  SpO2: 100% (11 Oct 2018 21:23) (100% - 100%)    Physical Exam:  General: Thin man, no acute distress  HEENT: normocephalic   Neck: Neck supple, non-tender, no thyromegaly  Chest: lungs clear bilaterally, non-labored breathing, no adventitious breath sounds  Cardiac: Regular rhythm  Abdomen: soft, non-distended, non-tender, no guarding or rebound  Extremities: No significant deformity or joint abnormality. No edema. Peripheral pulses intact. No varicosities.  Skin: no foot ulcers    LABS:                        12.0   4.32  )-----------( 320      ( 11 Oct 2018 17:17 )             36.3     10-11    133<L>  |  96<L>  |  4<L>  ----------------------------<  139<H>  4.3   |  22  |  0.40<L>    Ca    8.9      11 Oct 2018 17:17  Phos  2.5     10-11  Mg     2.2     10-11    TPro  7.6  /  Alb  4.1  /  TBili  0.4  /  DBili  x   /  AST  46<H>  /  ALT  41  /  AlkPhos  177<H>  10-11 (12 Oct 2018 13:24)    POCT Blood Glucose.: 126 mg/dL (10-24-18 @ 09:02)  POCT Blood Glucose.: 198 mg/dL (10-23-18 @ 21:19)  POCT Blood Glucose.: 110 mg/dL (10-23-18 @ 16:40)  POCT Blood Glucose.: 232 mg/dL (10-23-18 @ 11:46)  POCT Blood Glucose.: 119 mg/dL (10-23-18 @ 08:38)  POCT Blood Glucose.: 177 mg/dL (10-22-18 @ 20:16)  POCT Blood Glucose.: 78 mg/dL (10-22-18 @ 17:04)  POCT Blood Glucose.: 172 mg/dL (10-22-18 @ 13:00)  POCT Blood Glucose.: 172 mg/dL (10-22-18 @ 06:04)  POCT Blood Glucose.: 149 mg/dL (10-22-18 @ 00:55)  POCT Blood Glucose.: 123 mg/dL (10-21-18 @ 17:44)  POCT Blood Glucose.: 172 mg/dL (10-21-18 @ 11:48)                            7.8    2.91  )-----------( 305      ( 24 Oct 2018 05:56 )             23.1       10-24    141  |  105  |  6<L>  ----------------------------<  129<H>  3.9   |  26  |  0.54    EGFR if : 140  EGFR if non : 121    Ca    8.4      10-24  Mg     2.2     10-24  Phos  4.3     10-24    Hemoglobin A1C, Whole Blood: 6.6 % <H> [4.0 - 5.6] (10-13-18 @ 06:35)

## 2018-10-24 NOTE — CHART NOTE - NSCHARTNOTEFT_GEN_A_CORE
Team wanted oncology f/u after discharge for patient. Scheduling number provided to patient, please also include in discharge instructions ([phone]). I have also contacted the scheduling team and they will reach out to patient to make appointment. Pt advised to bring all records to appointment.    Sarah Garrett, PGY-4  Hematology-Oncology Fellow  [phone] ([place]) [phone] ([facility])

## 2018-10-24 NOTE — PROGRESS NOTE ADULT - ASSESSMENT
53M s/p Exploratory laparotomy with reduction of internal hernia & takedown of jejunojejunostomy with redo anastomosis, bleeding post op s/p transfusion now resolved, labs stable.    - Endocrine & med/onc c/s today, appreciate recs.  - Regular diet.  - Monitor GI function.  - pain control as needed.  - Continue BID PO Protonix.  - OOB to chair.  - [facility] is still on hold, continues SCDs.  - Plan for discharge home today.

## 2018-10-24 NOTE — CONSULT NOTE ADULT - PROBLEM SELECTOR PROBLEM 1
Type 2 diabetes mellitus without complication, without long-term current use of insulin
Pre-operative exam

## 2018-10-24 NOTE — DISCHARGE NOTE ADULT - MEDICATION SUMMARY - MEDICATIONS TO STOP TAKING
I will STOP taking the medications listed below when I get home from the hospital:    metoclopramide 10 mg oral tablet  -- 1 tab(s) as needed

## 2018-10-24 NOTE — DISCHARGE NOTE ADULT - HOSPITAL COURSE
53M s/p R1 resection [Whipple 11/2017] for T2N2M0 adenocarcinoma of the pancreas followed by adjuvant CRT w/Capecitabine / Gemcitabine who presents with recurrent nausea, vomiting and CT imaging showing a stable dilated proximal jejunum of Timmy limb. Finished CRT in May/2018 without evidence of recurrent disease. He underwent small bowel study showing passage of the contrast to the large intestine, however, he had a stricture at the J-J. His surgeon advised him to take 6 small meals instead of 3 meals. His symptoms slightly improved but he continues to have distension and nausea, which improves after vomiting. A CT scan of the abdomen performed yesterday showed bowel obstruction at the J-J anastomosis with marked dilatation of the small bowel.      Pt admitted to surgical oncology service, made NPO with IVF.  As GI function returned diet initially slowly advanced as tolerated to a full liquid diet.  Pt preopped.  Internal medicine consulted for preop optimization.  Pt went to the OR with Dr. Fairbanks on 10/17 for an Exploratory laparotomy with reduction of internal hernia & takedown of jejunojejunostomy with redo anastomosis.  Pt tolerated procedure well, without complication.  Post op pt transferred from PACU to surgical floor.  Overnight on POD 0-1 pt c/o chest pain EKG and trops unremarkable.  On POD#1 pt with bloody NGT outpt, likely result of a staple line bleed. INR slightly elevated, H/H dropping (7.7/21.9). Pt received 1uPRBC and 1u FFP to correct coagulopathy. H/H increased to 8.9/26 and INR corrected to WNL.  Pt remained hemodynamically stable.  Espinoza catheter removed on POD #2 and pt passed TOV.  As GI function returned and NGT output decreased, an NGT clamp trial was performed.  Once pt passed clamp trial, NGT was removed and diet was slowly restarted and advanced as tolerated.  Pain control transitioned from IV to PO pain meds. Pt currently ambulating, voiding, tolerating a regular diet, with full GI function, and pain well controlled on PO pain meds.  Per team and attending, pt is hemodynamically stable for discharge home to follow up as an outpatient.

## 2018-10-24 NOTE — DISCHARGE NOTE ADULT - PATIENT PORTAL LINK FT
You can access the "[employer] Patient Portal, offered by [facility], by registering with the following website: [email or web address] [facility]ar[facility]

## 2018-10-24 NOTE — DISCHARGE NOTE ADULT - NSCORESITESY/N_GEN_A_CORE_RD
Cardiovascular Surgery Progress Note    Name: Jae Venegas  MRN: [id number]  : 1968  Admit Date: 2018 10:33 PM  Procedure:  Procedure(s) and Anesthesia Type:     * MULTIPLE CORONARY ARTERY BYPASS ENDO VEIN HARVEST - X4, BILATERAL INTERNAL MAMMARY ARTERY HARVEST - General     * LIMA - General  5 Day Post-Op    Vitals:  Patient Vitals for the past 8 hrs:   Temp SpO2 O2 Delivery O2 (LPM) Pulse NIBP Weight   18 0800 36.8 °C (98.2 °F) 94 % - 2 (!) 107 101/67 -   18 0400 36.7 °C (98.1 °F) 92 % None (Room Air) - (!) 104 116/74 108.4 kg (238 lb 15.7 oz)     Temp (24hrs), Av.6 °C (97.9 °F), Min:36.1 °C (97 °F), Max:36.8 °C (98.2 °F)      Respiratory:    Respiration: 20, Pulse Oximetry: 94 %     Chest Tube Drains:          Fluids:    Intake/Output Summary (Last 24 hours) at 18 0946  Last data filed at 18 0400   Gross per 24 hour   Intake              820 ml   Output             1600 ml   Net             -780 ml     Admit weight: Weight: 108.9 kg (240 lb)  Current weight: Weight: 108.4 kg (238 lb 15.7 oz) (18 0400)    Labs:  Recent Labs      18   0450  18   0401  18   0550   WBC  10.8  9.2  14.0*   RBC  4.25*  4.21*  4.43*   HEMOGLOBIN  13.1*  12.8*  13.1*   HEMATOCRIT  37.7*  37.6*  39.3*   MCV  88.7  89.3  88.7   MCH  30.8  30.4  29.6   MCHC  34.7  34.0  33.3*   RDW  42.2  42.4  42.0   PLATELETCT  213  217  286   MPV  10.0  9.9  9.7     Recent Labs      18   0450  18   0401  18   0550   NEUTSPOLYS  66.40  59.70  65.20   LYMPHOCYTES  9.60*  9.10*  10.40*   MONOCYTES  21.90*  25.50*  10.40   EOSINOPHILS  0.70  4.10  0.90   BASOPHILS  0.40  0.50  0.00   RBCMORPHOLO   --    --   Present     Recent Labs      18   0450  18   0401  18   0550   SODIUM  134*  137  137   POTASSIUM  3.7  3.2*  3.0*   CHLORIDE  93*  96  96   CO2  33  33  31   GLUCOSE  120*  122*  116*   BUN  30*  26*  28*   CREATININE  1.06  1.08  1.34   CALCIUM  8.5  8.2*   8.2*           Medications:  • potassium chloride (KCL-CENTRAL) IV *Administer in ICU only*  40 mEq     • metoprolol  50 mg     • amiodarone  400 mg     • docusate sodium  100 mg      And   • senna-docusate  1 Tab     • enoxaparin  40 mg     • aspirin EC  81 mg     • clopidogrel  75 mg     • losartan  50 mg     • rosuvastatin  20 mg         Exam:   Review of Systems   Constitutional: Positive for malaise/fatigue.   HENT: Negative.    Eyes: Negative.    Respiratory: Negative.    Cardiovascular: Chest pain: MSK.   Gastrointestinal: Positive for abdominal pain and diarrhea. Negative for constipation.   Genitourinary: Negative.    Musculoskeletal: Negative.    Skin: Negative.    Neurological: Negative.    Endo/Heme/Allergies: Negative.    Psychiatric/Behavioral: Negative.        Physical Exam   Constitutional: He is oriented to person, place, and time. He appears well-developed and well-nourished.   HENT:   Head: Normocephalic.   Eyes: Pupils are equal, round, and reactive to light.   Neck: Normal range of motion. No JVD present.   Cardiovascular: Normal rate, regular rhythm and normal heart sounds.    Pulmonary/Chest: Effort normal and breath sounds normal.   Dim bases, shallow breaths   Abdominal: He exhibits distension. Bowel sounds are increased. There is tenderness in the right upper quadrant. There is no guarding.   Musculoskeletal: Normal range of motion. He exhibits edema.   Neurological: He is alert and oriented to person, place, and time.   Skin: Skin is warm and dry.   prevena to chest, EVH sites CDI   Psychiatric: He has a normal mood and affect. His behavior is normal. Judgment and thought content normal.       Quality Measures:   Quality-Core Measures   Bravo catheter::  No Bravo  Central line in place:  Need for access  DVT prophylaxis pharmacological::  Contraindicated - High bleeding risk  DVT prophylaxis - mechanical:  SCDs  Ulcer Prophylaxis::  Yes  Assessed for rehabilitation services:  Patient  returned to prior level of function, rehabilitation not indicated at this time    Assessment/Plan:  POD 1 HDS on low dose epi.  NSR. Vented, ABGs good, weaning to extubated this AM. CT output min, no airlekas.  Will keep CTs/gill today.  Plan to start diuresis once BP stable off epi.  Continuing ABX x 72 hours for bilateral ELIANE harvests.  CPM.  POD 2 HDS< NSR (afib with RVR this AM, converted on amio gtt protocol). Pain issues, shallow breathing.  CT output min, no airleak.  Passing gas, no BM.  PLAN:  Continue amio protocol. Add PO dilaudid.  Stress IS use.  DC mediastinal CTs and gill.  Diurese.   POD 3 HDS, Afib for 40 minutes this am- now SR- on amio gtt, CT bilat pleural tubes- 50/70 cc, sedate- dc ms contin, inc diuresis, amb, enc IS  POD 4 HDS, NSR on amio, min output blakes.  ABD distention, hyper/distant bowel sounds, RUQ pain.  States passing gas, but no BM.  PLAN:  DC blakes.  Continue diuresis.  ABD xray now.  Add reglan. Limit narcotics. AMB/IS.  POD 5 HDS/HTN, NSR/ST, short bout of afib again with ambulation rates to 140s, currently NSR on amio gtt.  States feels a little better this AM.  Had large loose/watery stool this AM.  ABD remains very distended, hypobowel sounds.  PLAN:  Keep NPO, gen surg following.  Continue diuresis, decrease amount since NPO.  Decrease amio gtt to 0.5 mg/min.  Increase BB. AMB/IS.    POD 6 HDS, SR/ST- inc beta blocker- change amio to PO, ileus- tolerating clear liquids- surgery following, replace K+,  amb, enc IS   POD 7 HDS< NSR, n/v overnight, abd remains distended, hypo bowel sounds.  K low, creatinine up slightly.  PLAN;  Upper GI series this AM.  Replace K.  DC lasix, start maintenance fluids while NPO.  CPM.    Patient seen, examined and plan reviewed with midlevel provider. I agree with the plan.      Active Hospital Problems    Diagnosis   • STEMI (ST elevation myocardial infarction) (HCC) [I21.3]     Priority: High   • HTN (hypertension) [I10]            No

## 2018-10-24 NOTE — DISCHARGE NOTE ADULT - PLAN OF CARE
wound healing WOUND CARE: Apply clean gauze and paper tape over incision site once a day.  Keep incisions clean and dry.  Staples will be removed at your follow up appointment.    BATHING: Please do not submerge wound underwater. You may shower and/or sponge bathe. It is OK to wash drain wound site.  ACTIVITY: No heavy lifting or straining. Otherwise, you may return to your usual level of physical activity. If you are taking narcotic pain medication (such as Percocet) DO NOT drive a car, operate machinery or make important decisions.  DIET: Return to your usual diabetic diet.  NOTIFY YOUR SURGEON IF: You have any bleeding that does not stop, any pus draining from your wound(s), any fever (over 100.4 F) or chills, persistent nausea/vomiting, persistent diarrhea, or if your pain is not controlled on your discharge pain medications.  FOLLOW-UP: Please follow up with your primary care physician in one week regarding your hospitalization.  Please follow up with Dr. Fairbakns in one week.  Call [phone] to schedule an appointment. Please follow up with your primary care physician regarding your hospitalization Please follow up with your primary care physician regarding your hospitalization.   Please follow up at the Endocrine clinic at [facility]. Please call [phone] to schedule an appointment.  Please check blood sugar before meals and at bedtime. Please record all readings and bring to follow up appointment.

## 2018-10-24 NOTE — DISCHARGE NOTE ADULT - OTHER SIGNIFICANT FINDINGS
ACCESSION No:  80 [id number]    AFRICA SOLIZ                1        Surgical Final Report          Final Diagnosis  1. Inter-loop adhesion,ACCESSION No:  80 [id number]    AFRICA SOLIZ                1        Surgical Final Report          Final Diagnosis  1. Inter-loop adhesion, excision  - Fibroadipose tissue with reactive fibroblastic  proliferation    2. Small bowel at jejuno-jejunostomy, excision  - Small bowel with fibrous adhesion  - Margins of resection are viable   excision  - Fibroadipose tissue with reactive fibroblastic  proliferation    2. Small bowel at jejuno-jejunostomy, excision  - Small bowel with fibrous adhesion  - Margins of resection are viable

## 2018-10-24 NOTE — CONSULT NOTE ADULT - ASSESSMENT
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  (High medical decision making)

## 2018-10-24 NOTE — DISCHARGE NOTE ADULT - MEDICATION SUMMARY - MEDICATIONS TO TAKE
I will START or STAY ON the medications listed below when I get home from the hospital:    acetaminophen 325 mg oral tablet  -- 2 tab(s) by mouth every 6 hours, As needed, Mild Pain (1 - 3), Moderate Pain (4 - 6)  -- Indication: For Pain    pioglitazone 30 mg oral tablet  -- 1 tab(s) by mouth once a day  -- Indication: For DM    metFORMIN 500 mg oral tablet, extended release  -- 1 tab(s) by mouth once a day  -- Indication: For DM    Crestor 20 mg oral tablet  -- 1 tab(s) by mouth once a day (at bedtime)  -- Indication: For CHolesterol    Creon 12,000 units oral delayed release capsule  -- 1 cap(s) by mouth 3 times a day  -- Indication: For S/p whipple    omeprazole 20 mg oral delayed release capsule  -- 20 milligram(s) by mouth 2 times a day  -- Indication: For S/p whipple    Vitamin D3  -- Indication: For Vitamin I will START or STAY ON the medications listed below when I get home from the hospital:    lancets   -- Test blood sugars four times a day as directed   -- Indication: For DM    Alcohol swab pads  -- Please use to clean finger prior to finger stick   -- Indication: For DM    Glucometer   -- Glucometer ( per patients insurance) Please use as directed 4 times per day   -- Indication: For DM    Glucose test strips   -- same brand as meter. Please test blood sugar 4 times per day. Use as directed.   QTY:100 strips   -- Indication: For DM    acetaminophen 325 mg oral tablet  -- 2 tab(s) by mouth every 6 hours, As needed, Mild Pain (1 - 3), Moderate Pain (4 - 6)  -- Indication: For Pain    pioglitazone 30 mg oral tablet  -- 1 tab(s) by mouth once a day  -- Indication: For DM    metFORMIN 500 mg oral tablet, extended release  -- 1 tab(s) by mouth once a day  -- Indication: For DM    Crestor 20 mg oral tablet  -- 1 tab(s) by mouth once a day (at bedtime)  -- Indication: For CHolesterol    Creon 12,000 units oral delayed release capsule  -- 1 cap(s) by mouth 3 times a day  -- Indication: For S/p whipple    omeprazole 20 mg oral delayed release capsule  -- 20 milligram(s) by mouth 2 times a day  -- Indication: For S/p whipple    Vitamin D3  -- Indication: For Vitamin I will START or STAY ON the medications listed below when I get home from the hospital:    Alcohol swab pads  -- Use to clean prior to fingerstick 4 times daily  -- Indication: For Diabetes    Glucometer   -- Per patients insurance  Use as directed 4 times daily  -- Indication: For Diabetes    Glucose test strips   -- Same brand as insurance  Use as directed 4 times daily  Qty 100 test strips  -- Indication: For Diabetes    lancets   -- Use as directed 4 times daily  -- Indication: For Diabetes    acetaminophen 325 mg oral tablet  -- 2 tab(s) by mouth every 6 hours, As needed, Mild Pain (1 - 3), Moderate Pain (4 - 6)  -- Indication: For Pain    metFORMIN 500 mg oral tablet, extended release  -- 1 tab(s) by mouth once a day  -- Indication: For Diabetes    pioglitazone 30 mg oral tablet  -- 1 tab(s) by mouth once a day  -- Indication: For Diabetes    Crestor 20 mg oral tablet  -- 1 tab(s) by mouth once a day (at bedtime)  -- Indication: For High cholesterol    Creon 12,000 units oral delayed release capsule  -- 1 cap(s) by mouth 3 times a day  -- Indication: For Digestive enzymes    omeprazole 20 mg oral delayed release capsule  -- 20 milligram(s) by mouth 2 times a day  -- Indication: For gastrointestinal    Vitamin D3 50,000 intl units oral capsule  -- 1 cap(s) by mouth once a day   -- It is very important that you take or use this exactly as directed.  Do not skip doses or discontinue unless directed by your doctor.    -- Indication: For vitamin supplement

## 2018-10-30 ENCOUNTER — OUTPATIENT (OUTPATIENT)
Dept: OUTPATIENT SERVICES | Facility: HOSPITAL | Age: 53
LOS: 1 days | Discharge: HOME | End: 2018-10-30

## 2018-10-30 DIAGNOSIS — C25.9 MALIGNANT NEOPLASM OF PANCREAS, UNSPECIFIED: ICD-10-CM

## 2018-11-01 ENCOUNTER — APPOINTMENT (OUTPATIENT)
Dept: SURGICAL ONCOLOGY | Facility: CLINIC | Age: 53
End: 2018-11-01
Payer: MEDICAID

## 2018-11-01 VITALS
HEIGHT: 65.5 IN | SYSTOLIC BLOOD PRESSURE: 92 MMHG | HEART RATE: 72 BPM | DIASTOLIC BLOOD PRESSURE: 64 MMHG | RESPIRATION RATE: 14 BRPM | BODY MASS INDEX: 16.13 KG/M2 | WEIGHT: 98 LBS

## 2018-11-01 PROCEDURE — 99024 POSTOP FOLLOW-UP VISIT: CPT

## 2018-11-14 ENCOUNTER — APPOINTMENT (OUTPATIENT)
Dept: SURGICAL ONCOLOGY | Facility: CLINIC | Age: 53
End: 2018-11-14
Payer: MEDICAID

## 2018-11-14 VITALS
RESPIRATION RATE: 15 BRPM | HEART RATE: 80 BPM | DIASTOLIC BLOOD PRESSURE: 71 MMHG | WEIGHT: 104.98 LBS | HEIGHT: 60 IN | BODY MASS INDEX: 20.61 KG/M2 | SYSTOLIC BLOOD PRESSURE: 101 MMHG

## 2018-11-14 DIAGNOSIS — Z86.39 PERSONAL HISTORY OF OTHER ENDOCRINE, NUTRITIONAL AND METABOLIC DISEASE: ICD-10-CM

## 2018-11-14 PROCEDURE — 99024 POSTOP FOLLOW-UP VISIT: CPT

## 2018-11-15 ENCOUNTER — RESULT REVIEW (OUTPATIENT)
Age: 53
End: 2018-11-15

## 2018-11-15 ENCOUNTER — APPOINTMENT (OUTPATIENT)
Dept: HEMATOLOGY ONCOLOGY | Facility: CLINIC | Age: 53
End: 2018-11-15

## 2018-11-15 ENCOUNTER — APPOINTMENT (OUTPATIENT)
Dept: SURGICAL ONCOLOGY | Facility: CLINIC | Age: 53
End: 2018-11-15
Payer: MEDICAID

## 2018-11-15 VITALS
OXYGEN SATURATION: 99 % | HEART RATE: 78 BPM | BODY MASS INDEX: 16.17 KG/M2 | SYSTOLIC BLOOD PRESSURE: 93 MMHG | WEIGHT: 103 LBS | HEIGHT: 67 IN | DIASTOLIC BLOOD PRESSURE: 64 MMHG

## 2018-11-15 VITALS
OXYGEN SATURATION: 100 % | RESPIRATION RATE: 16 BRPM | BODY MASS INDEX: 16.26 KG/M2 | TEMPERATURE: 98.2 F | DIASTOLIC BLOOD PRESSURE: 65 MMHG | SYSTOLIC BLOOD PRESSURE: 94 MMHG | HEART RATE: 73 BPM | HEIGHT: 66.93 IN | WEIGHT: 103.62 LBS

## 2018-11-15 LAB
HCT VFR BLD CALC: 33.1 % — LOW (ref 39–50)
HGB BLD-MCNC: 11.1 G/DL — LOW (ref 13–17)
MCHC RBC-ENTMCNC: 30.5 PG — SIGNIFICANT CHANGE UP (ref 27–34)
MCHC RBC-ENTMCNC: 33.7 G/DL — SIGNIFICANT CHANGE UP (ref 32–36)
MCV RBC AUTO: 90.6 FL — SIGNIFICANT CHANGE UP (ref 80–100)
PLATELET # BLD AUTO: 263 K/UL — SIGNIFICANT CHANGE UP (ref 150–400)
RBC # BLD: 3.65 M/UL — LOW (ref 4.2–5.8)
RBC # FLD: 14 % — SIGNIFICANT CHANGE UP (ref 10.3–14.5)
WBC # BLD: 3.8 K/UL — SIGNIFICANT CHANGE UP (ref 3.8–10.5)
WBC # FLD AUTO: 3.8 K/UL — SIGNIFICANT CHANGE UP (ref 3.8–10.5)

## 2018-11-15 PROCEDURE — 99024 POSTOP FOLLOW-UP VISIT: CPT

## 2018-11-16 ENCOUNTER — OUTPATIENT (OUTPATIENT)
Dept: OUTPATIENT SERVICES | Facility: HOSPITAL | Age: 53
LOS: 1 days | End: 2018-11-16

## 2018-11-16 ENCOUNTER — INBOUND DOCUMENT (OUTPATIENT)
Age: 53
End: 2018-11-16

## 2018-11-16 ENCOUNTER — OTHER (OUTPATIENT)
Age: 53
End: 2018-11-16

## 2018-11-16 ENCOUNTER — LABORATORY RESULT (OUTPATIENT)
Age: 53
End: 2018-11-16

## 2018-11-16 ENCOUNTER — APPOINTMENT (OUTPATIENT)
Dept: ENDOCRINOLOGY | Facility: HOSPITAL | Age: 53
End: 2018-11-16
Payer: MEDICAID

## 2018-11-16 ENCOUNTER — RESULT CHARGE (OUTPATIENT)
Age: 53
End: 2018-11-16

## 2018-11-16 VITALS — HEART RATE: 68 BPM | DIASTOLIC BLOOD PRESSURE: 50 MMHG | SYSTOLIC BLOOD PRESSURE: 86 MMHG

## 2018-11-16 VITALS
SYSTOLIC BLOOD PRESSURE: 85 MMHG | BODY MASS INDEX: 16.53 KG/M2 | WEIGHT: 105.31 LBS | DIASTOLIC BLOOD PRESSURE: 51 MMHG | HEIGHT: 67 IN | HEART RATE: 77 BPM

## 2018-11-16 VITALS — SYSTOLIC BLOOD PRESSURE: 90 MMHG | DIASTOLIC BLOOD PRESSURE: 54 MMHG | HEART RATE: 72 BPM

## 2018-11-16 DIAGNOSIS — Z85.9 PERSONAL HISTORY OF MALIGNANT NEOPLASM, UNSPECIFIED: ICD-10-CM

## 2018-11-16 DIAGNOSIS — Z80.3 FAMILY HISTORY OF MALIGNANT NEOPLASM OF BREAST: ICD-10-CM

## 2018-11-16 PROCEDURE — 99204 OFFICE O/P NEW MOD 45 MIN: CPT | Mod: GC

## 2018-11-16 RX ORDER — PANCRELIPASE 60000; 12000; 38000 [USP'U]/1; [USP'U]/1; [USP'U]/1
12000-38000 CAPSULE, DELAYED RELEASE PELLETS ORAL
Refills: 0 | Status: DISCONTINUED | COMMUNITY
End: 2018-11-16

## 2018-11-16 NOTE — ASSESSMENT
[FreeTextEntry1] : Pt is a 54 yo male w/ pmhx diabetes and pancreatic adenocarcinoma s/p Whipple's procedure in 2017 and adjuvant chemoradiation now in remission who presents for initial visit.\par \par 1) Pancreatic Adenocarcinoma- initial T2N2 disease s/p Whipple's w/ R1 resection and adjuvant chemo (gem + capecitabine) and radiation \par - PET in July with no recurrence of disease per [place] reports\par - CT A/P without any evidence of disease 10/18; exploratory laparotomy at that time for SBO and revision of J-J anastomosis with benign path\par - Most recent Ca 19-9 <1, however unsure of baseline values at time of diagnosis\par - will plan on surveillance with imaging q6 month\par - will need referral to genetics \par - continue creon\par - RTC 3 mo\par \par 2) Diabetes Mellitus- refilled diabetic meds, pt notes sugars have been running high\par - check HgA1C\par - has initial appt with Endo tomorrow

## 2018-11-16 NOTE — HISTORY OF PRESENT ILLNESS
[de-identified] : Pt is a 54 yo male w/ pmhx diabetes and pancreatic adenocarcinoma s/p Whipple's procedure in 2017 and adjuvant chemoradiation who presents for initial visit. He was noted to have obstructive jaundice late in 2017. He underwent MRI/MRCP which showed a 3.8 mass in the pancreatic head and uncinate process. He followed up in [facility] and underwent a whipple's resection in 11/2017. 4/29 nodes examined had evidence of cancer with some extranodal invasion. He had T2N2 disease on presentation and had R1 resection. He subsequently underwent concurrent chemoradiation. He completed 2 cycles of gemcitabine and capecitabine. Interim PET showed no disease, and he was then treated with 45 Gy radiation in 25 fractions to tumor bed and regional lymphatics with concurrent capecitabine. He followed this with 2 more cycles of gemcitabine and capecitabine. PET/CT after treatment in 7/2018 showed no recurrence of disease. [place] and was admitted to [facility] 10/2018 with small bowel obstruction. He is status post laparotomy and reduction of an internal hernia and revision of a strictured jejunal-jejunal anastomosis on 10/17/18. Pathology was benign. \par  [de-identified] : Today, pt notes he is eating more and gaining weight. He still is having loose stools up to 3 times/day. Mild abdominal pain/fullness after meals but otherwise no pain. He reports recent sugars have been >300 and he has been eating sweets. No fevers, chills, constipation, reflux, skin changes, new lymph nodes, headache, night sweats.

## 2018-11-16 NOTE — PHYSICAL EXAM
[Restricted in physically strenuous activity but ambulatory and able to carry out work of a light or sedentary nature] : Status 1- Restricted in physically strenuous activity but ambulatory and able to carry out work of a light or sedentary nature, e.g., light house work, office work [Thin] : thin [Normal] : grossly intact [de-identified] : surgical scars clean

## 2018-11-17 LAB
C PEPTIDE SERPL-MCNC: 2.2 NG/ML — SIGNIFICANT CHANGE UP (ref 0.9–7.1)
CREAT UR-MCNC: 68 MG/DL — SIGNIFICANT CHANGE UP
MICROALBUMIN UR-MCNC: < 1.2 MG/DL — SIGNIFICANT CHANGE UP

## 2018-11-19 ENCOUNTER — APPOINTMENT (OUTPATIENT)
Dept: INTERNAL MEDICINE | Facility: HOSPITAL | Age: 53
End: 2018-11-19
Payer: MEDICAID

## 2018-11-19 ENCOUNTER — OUTPATIENT (OUTPATIENT)
Dept: OUTPATIENT SERVICES | Facility: HOSPITAL | Age: 53
LOS: 1 days | End: 2018-11-19

## 2018-11-19 VITALS — HEART RATE: 62 BPM | RESPIRATION RATE: 12 BRPM | SYSTOLIC BLOOD PRESSURE: 80 MMHG | DIASTOLIC BLOOD PRESSURE: 52 MMHG

## 2018-11-19 VITALS — HEIGHT: 67 IN | WEIGHT: 107 LBS | BODY MASS INDEX: 16.79 KG/M2

## 2018-11-19 DIAGNOSIS — Z80.0 FAMILY HISTORY OF MALIGNANT NEOPLASM OF DIGESTIVE ORGANS: ICD-10-CM

## 2018-11-19 DIAGNOSIS — E11.49 TYPE 2 DIABETES MELLITUS WITH OTHER DIABETIC NEUROLOGICAL COMPLICATION: ICD-10-CM

## 2018-11-19 DIAGNOSIS — Z78.9 OTHER SPECIFIED HEALTH STATUS: ICD-10-CM

## 2018-11-19 DIAGNOSIS — K86.89 OTHER SPECIFIED DISEASES OF PANCREAS: ICD-10-CM

## 2018-11-19 DIAGNOSIS — E55.9 VITAMIN D DEFICIENCY, UNSPECIFIED: ICD-10-CM

## 2018-11-19 DIAGNOSIS — R45.4 IRRITABILITY AND ANGER: ICD-10-CM

## 2018-11-19 DIAGNOSIS — E78.5 HYPERLIPIDEMIA, UNSPECIFIED: ICD-10-CM

## 2018-11-19 DIAGNOSIS — Z87.891 PERSONAL HISTORY OF NICOTINE DEPENDENCE: ICD-10-CM

## 2018-11-19 PROBLEM — Z85.9 HISTORY OF MALIGNANT NEOPLASM: Status: RESOLVED | Noted: 2018-11-01 | Resolved: 2018-11-19

## 2018-11-19 PROBLEM — Z80.3 FAMILY HISTORY OF MALIGNANT NEOPLASM OF BREAST: Status: ACTIVE | Noted: 2018-11-01

## 2018-11-19 PROCEDURE — 99203 OFFICE O/P NEW LOW 30 MIN: CPT | Mod: GE

## 2018-11-19 RX ORDER — PIOGLITAZONE HYDROCHLORIDE 30 MG/1
30 TABLET ORAL DAILY
Qty: 30 | Refills: 0 | Status: DISCONTINUED | COMMUNITY
End: 2018-11-19

## 2018-11-19 RX ORDER — METFORMIN HYDROCHLORIDE 500 MG/1
500 TABLET, COATED ORAL DAILY
Qty: 30 | Refills: 0 | Status: DISCONTINUED | COMMUNITY
End: 2018-11-19

## 2018-11-20 DIAGNOSIS — Z78.9 OTHER SPECIFIED HEALTH STATUS: ICD-10-CM

## 2018-11-20 DIAGNOSIS — C25.9 MALIGNANT NEOPLASM OF PANCREAS, UNSPECIFIED: ICD-10-CM

## 2018-11-20 DIAGNOSIS — R45.4 IRRITABILITY AND ANGER: ICD-10-CM

## 2018-11-20 DIAGNOSIS — Z80.0 FAMILY HISTORY OF MALIGNANT NEOPLASM OF DIGESTIVE ORGANS: ICD-10-CM

## 2018-11-20 DIAGNOSIS — E11.9 TYPE 2 DIABETES MELLITUS WITHOUT COMPLICATIONS: ICD-10-CM

## 2018-11-20 DIAGNOSIS — E78.5 HYPERLIPIDEMIA, UNSPECIFIED: ICD-10-CM

## 2018-11-20 DIAGNOSIS — Z87.891 PERSONAL HISTORY OF NICOTINE DEPENDENCE: ICD-10-CM

## 2018-11-20 LAB — GLUCOSE BLDC GLUCOMTR-MCNC: 276

## 2018-11-26 DIAGNOSIS — E11.9 TYPE 2 DIABETES MELLITUS WITHOUT COMPLICATIONS: ICD-10-CM

## 2018-11-26 NOTE — ASSESSMENT
[FreeTextEntry1] : Heatl[facility] Care Maintenance\par -Upon return visit will need to find out if he had: colonoscopy, HCV screening, pneumovax, tetanus\par -Followup appointment in 5 weeks to confirm routine health maintenance needs

## 2018-11-26 NOTE — HISTORY OF PRESENT ILLNESS
[Spouse] : spouse [FreeTextEntry1] : Blood sugar, anxiety, insomnia. New patient visit.  [de-identified] : 52 [facility] [place] [facility] who recently [place] the [place] ago w/t a PMHx of T2N2 pancreatic adenocarcinoma s/p Whipple procedure ('17) w/t adjuvant chemoradiation (2 cycles of gemcitabine & capecitabine) c/b internal hernia s/p open laparotomy and revision (10/18), HLD, and NIDDM ('16) who presents w/t concern about labile post meal blood sugars, insomnia, and increasing irritability. Per patient and his wife, he has been eating more ever since his surgery to help his recovery and that 1 hour after eating he often has FSG readings near 300, which he never had before. He eats several large meals per day: breakfast 8am, lunch 1-2pm, dinner 6pm, and a 4th meal at 11pm-12am. He also notes that ever since his recent laparotomy he has been more irritable, anxious, and stressed out. He feels like he is snapping at people including his wife more frequently than he ever used to. Lastly, he has been having difficulty with sleep ever since moving to the [place] as he works remotely from home on [employer] hours. So he is up most of the night working.

## 2018-11-26 NOTE — REVIEW OF SYSTEMS
[Frequency] : frequency [Fever] : no fever [Chills] : no chills [Fatigue] : no fatigue [Hot Flashes] : no hot flashes [Night Sweats] : no night sweats [Recent Change In Weight] : ~T no recent weight change [Discharge] : no discharge [Redness] : no redness [Dryness] : no dryness [Vision Problems] : no vision problems [Earache] : no earache [Hearing Loss] : no hearing loss [Nosebleeds] : no nosebleeds [Postnasal Drip] : no postnasal drip [Nasal Discharge] : no nasal discharge [Sore Throat] : no sore throat [Chest Pain] : no chest pain [Palpitations] : no palpitations [Claudication] : no  leg claudication [Lower Ext Edema] : no lower extremity edema [Orthopena] : no orthopnea [Paroysmal Nocturnal Dyspnea] : no paroysmal nocturnal dyspnea [Shortness Of Breath] : no shortness of breath [Wheezing] : no wheezing [Cough] : no cough [Dyspnea on Exertion] : not dyspnea on exertion [Abdominal Pain] : no abdominal pain [Constipation] : no constipation [Diarrhea] : no diarrhea [Vomiting] : no vomiting [Heartburn] : no heartburn [Melena] : no melena [Dysuria] : no dysuria [Incontinence] : no incontinence [Hematuria] : no hematuria [Joint Pain] : no joint pain [Skin Rash] : no skin rash [Headache] : no headache

## 2018-11-26 NOTE — HEALTH RISK ASSESSMENT
[No falls in past year] : Patient reported no falls in the past year [0] : 2) Feeling down, depressed, or hopeless: Not at all (0) [Change in mental status noted] : Change in mental status noted [Financial] : financial [With Significant Other] : lives with significant other [Employed] : employed [] :  [Sexually Active] : sexually active [Feels Safe at Home] : Feels safe at home [Fully functional (bathing, dressing, toileting, transferring, walking, feeding)] : Fully functional (bathing, dressing, toileting, transferring, walking, feeding) [Fully functional (using the telephone, shopping, preparing meals, housekeeping, doing laundry, using] : Fully functional and needs no help or supervision to perform IADLs (using the telephone, shopping, preparing meals, housekeeping, doing laundry, using transportation, managing medications and managing finances) [] : No [de-identified] : 20 pack year smoking history, Quit in 2010 [de-identified] : Drinks 1-3 beers occasionally, once per month [WDG2Nrtbu] : 0 [Reports changes in hearing] : Reports no changes in hearing [Reports changes in vision] : Reports no changes in vision [Reports changes in dental health] : Reports no changes in dental health [de-identified] : Increased irritability

## 2018-11-26 NOTE — END OF VISIT
[] : Resident [FreeTextEntry3] : 53M here to establish care. H/o pancreatic cancer s/p whipple and revision procedures (2017/2018), here to establish care. Also with well-controlled diabetes on metformin. Concern regarding elevated post-prandial blood sugars, but patient checking <60 minutes after eating. advised to wait 2 hours, keep a log and will discuss results at next visit. Additionally, referral to behavioaral health for labile mood and anxiety after surgery. Agree with remainder of plan as documented above by Dr. Joel

## 2018-11-27 LAB — GLUCOSE BLDC GLUCOMTR-MCNC: 295

## 2018-11-30 ENCOUNTER — LABORATORY RESULT (OUTPATIENT)
Age: 53
End: 2018-11-30

## 2018-11-30 ENCOUNTER — OUTPATIENT (OUTPATIENT)
Dept: OUTPATIENT SERVICES | Facility: HOSPITAL | Age: 53
LOS: 1 days | End: 2018-11-30

## 2018-11-30 ENCOUNTER — APPOINTMENT (OUTPATIENT)
Dept: ENDOCRINOLOGY | Facility: HOSPITAL | Age: 53
End: 2018-11-30

## 2018-11-30 VITALS
DIASTOLIC BLOOD PRESSURE: 73 MMHG | HEIGHT: 67 IN | HEART RATE: 81 BPM | SYSTOLIC BLOOD PRESSURE: 119 MMHG | WEIGHT: 111 LBS | BODY MASS INDEX: 17.42 KG/M2

## 2018-11-30 DIAGNOSIS — E11.9 TYPE 2 DIABETES MELLITUS WITHOUT COMPLICATIONS: ICD-10-CM

## 2018-11-30 DIAGNOSIS — E78.5 HYPERLIPIDEMIA, UNSPECIFIED: ICD-10-CM

## 2018-11-30 DIAGNOSIS — Z00.00 ENCOUNTER FOR GENERAL ADULT MEDICAL EXAMINATION WITHOUT ABNORMAL FINDINGS: ICD-10-CM

## 2018-11-30 DIAGNOSIS — E11.49 TYPE 2 DIABETES MELLITUS WITH OTHER DIABETIC NEUROLOGICAL COMPLICATION: ICD-10-CM

## 2018-11-30 LAB
24R-OH-CALCIDIOL SERPL-MCNC: 17.2 NG/ML — LOW (ref 30–80)
CHOLEST SERPL-MCNC: 129 MG/DL — SIGNIFICANT CHANGE UP (ref 120–199)
FRUCTOSAMINE SERPL-MCNC: 366 UMOL/L — HIGH (ref 205–285)
GLUCOSE BLDC GLUCOMTR-MCNC: 242
HDLC SERPL-MCNC: 46 MG/DL — SIGNIFICANT CHANGE UP (ref 35–55)
LIPID PNL WITH DIRECT LDL SERPL: 71 MG/DL — SIGNIFICANT CHANGE UP
TRIGL SERPL-MCNC: 119 MG/DL — SIGNIFICANT CHANGE UP (ref 10–149)
TSH SERPL-MCNC: 1.86 UIU/ML — SIGNIFICANT CHANGE UP (ref 0.27–4.2)

## 2018-12-11 ENCOUNTER — APPOINTMENT (OUTPATIENT)
Dept: OPHTHALMOLOGY | Facility: CLINIC | Age: 53
End: 2018-12-11
Payer: MEDICAID

## 2018-12-11 PROCEDURE — 92004 COMPRE OPH EXAM NEW PT 1/>: CPT

## 2018-12-12 ENCOUNTER — MEDICATION RENEWAL (OUTPATIENT)
Age: 53
End: 2018-12-12

## 2018-12-19 ENCOUNTER — FORM ENCOUNTER (OUTPATIENT)
Age: 53
End: 2018-12-19

## 2018-12-20 ENCOUNTER — APPOINTMENT (OUTPATIENT)
Dept: CT IMAGING | Facility: IMAGING CENTER | Age: 53
End: 2018-12-20
Payer: MEDICAID

## 2018-12-20 ENCOUNTER — OUTPATIENT (OUTPATIENT)
Dept: OUTPATIENT SERVICES | Facility: HOSPITAL | Age: 53
LOS: 1 days | End: 2018-12-20
Payer: MEDICAID

## 2018-12-20 ENCOUNTER — APPOINTMENT (OUTPATIENT)
Dept: SURGICAL ONCOLOGY | Facility: CLINIC | Age: 53
End: 2018-12-20
Payer: MEDICAID

## 2018-12-20 VITALS
BODY MASS INDEX: 16.64 KG/M2 | RESPIRATION RATE: 15 BRPM | SYSTOLIC BLOOD PRESSURE: 100 MMHG | DIASTOLIC BLOOD PRESSURE: 65 MMHG | WEIGHT: 106 LBS | HEIGHT: 67 IN | OXYGEN SATURATION: 96 % | HEART RATE: 86 BPM

## 2018-12-20 DIAGNOSIS — C25.9 MALIGNANT NEOPLASM OF PANCREAS, UNSPECIFIED: ICD-10-CM

## 2018-12-20 PROCEDURE — 82565 ASSAY OF CREATININE: CPT

## 2018-12-20 PROCEDURE — 71046 X-RAY EXAM CHEST 2 VIEWS: CPT | Mod: 26

## 2018-12-20 PROCEDURE — 74177 CT ABD & PELVIS W/CONTRAST: CPT | Mod: 26

## 2018-12-20 PROCEDURE — 99024 POSTOP FOLLOW-UP VISIT: CPT

## 2018-12-20 PROCEDURE — 71046 X-RAY EXAM CHEST 2 VIEWS: CPT

## 2018-12-20 PROCEDURE — 74177 CT ABD & PELVIS W/CONTRAST: CPT

## 2018-12-21 ENCOUNTER — OUTPATIENT (OUTPATIENT)
Dept: OUTPATIENT SERVICES | Facility: HOSPITAL | Age: 53
LOS: 1 days | Discharge: HOME | End: 2018-12-21

## 2018-12-21 DIAGNOSIS — C25.9 MALIGNANT NEOPLASM OF PANCREAS, UNSPECIFIED: ICD-10-CM

## 2018-12-22 ENCOUNTER — APPOINTMENT (OUTPATIENT)
Dept: NUCLEAR MEDICINE | Facility: IMAGING CENTER | Age: 53
End: 2018-12-22

## 2018-12-24 ENCOUNTER — APPOINTMENT (OUTPATIENT)
Dept: HEMATOLOGY ONCOLOGY | Facility: CLINIC | Age: 53
End: 2018-12-24

## 2018-12-24 VITALS
WEIGHT: 108.47 LBS | OXYGEN SATURATION: 98 % | DIASTOLIC BLOOD PRESSURE: 65 MMHG | HEART RATE: 96 BPM | TEMPERATURE: 97.7 F | SYSTOLIC BLOOD PRESSURE: 98 MMHG | RESPIRATION RATE: 15 BRPM | BODY MASS INDEX: 16.99 KG/M2

## 2018-12-26 ENCOUNTER — APPOINTMENT (OUTPATIENT)
Dept: INTERNAL MEDICINE | Facility: HOSPITAL | Age: 53
End: 2018-12-26

## 2018-12-28 NOTE — PHYSICAL EXAM
[Restricted in physically strenuous activity but ambulatory and able to carry out work of a light or sedentary nature] : Status 1- Restricted in physically strenuous activity but ambulatory and able to carry out work of a light or sedentary nature, e.g., light house work, office work [Thin] : thin [Normal] : grossly intact [de-identified] : surgical scars clean

## 2018-12-28 NOTE — REVIEW OF SYSTEMS
[Cough] : cough [Negative] : Gastrointestinal [Shortness Of Breath] : no shortness of breath [Wheezing] : no wheezing

## 2018-12-28 NOTE — HISTORY OF PRESENT ILLNESS
[de-identified] : Pt is a 52 yo male w/ pmhx diabetes and pancreatic adenocarcinoma s/p Whipple's procedure in 2017 and adjuvant chemoradiation who presents for initial visit. He was noted to have obstructive jaundice late in 2017. He underwent MRI/MRCP which showed a 3.8 mass in the pancreatic head and uncinate process. He followed up in [facility] and underwent a whipple's resection in 11/2017. 4/29 nodes examined had evidence of cancer with some extranodal invasion. He had T2N2 disease on presentation and had R1 resection. He subsequently underwent concurrent chemoradiation. He completed 2 cycles of gemcitabine and capecitabine. Interim PET showed no disease, and he was then treated with 45 Gy radiation in 25 fractions to tumor bed and regional lymphatics with concurrent capecitabine. He followed this with 2 more cycles of gemcitabine and capecitabine. PET/CT after treatment in 7/2018 showed no recurrence of disease. [place] and was admitted to [facility] 10/2018 with small bowel obstruction. He is status post laparotomy and reduction of an internal hernia and revision of a strictured jejunal-jejunal anastomosis on 10/17/18. Pathology was benign. \par  [de-identified] : Pt with mild cough today, no fevers or chills. Notes he is eating and is continuing to gain weight. Followed with endo, but has not been checking sugars as he does not have machine. Having 3-4 BMs a day, sometimes more formed since increasing creon to 3/day. No n/v/abdominal pain/headache/swelling.

## 2019-01-04 ENCOUNTER — RESULT CHARGE (OUTPATIENT)
Age: 54
End: 2019-01-04

## 2019-01-04 ENCOUNTER — OUTPATIENT (OUTPATIENT)
Dept: OUTPATIENT SERVICES | Facility: HOSPITAL | Age: 54
LOS: 1 days | End: 2019-01-04

## 2019-01-04 ENCOUNTER — APPOINTMENT (OUTPATIENT)
Dept: ENDOCRINOLOGY | Facility: HOSPITAL | Age: 54
End: 2019-01-04
Payer: MEDICAID

## 2019-01-04 VITALS
SYSTOLIC BLOOD PRESSURE: 105 MMHG | WEIGHT: 109 LBS | BODY MASS INDEX: 17.11 KG/M2 | DIASTOLIC BLOOD PRESSURE: 70 MMHG | HEIGHT: 67 IN | HEART RATE: 81 BPM

## 2019-01-04 DIAGNOSIS — E11.9 TYPE 2 DIABETES MELLITUS WITHOUT COMPLICATIONS: ICD-10-CM

## 2019-01-04 LAB — GLUCOSE BLDC GLUCOMTR-MCNC: 346

## 2019-01-04 PROCEDURE — 99214 OFFICE O/P EST MOD 30 MIN: CPT | Mod: GC

## 2019-01-04 RX ORDER — ALCOHOL AND BENZOCAINE .06; .7 ML/1; ML/1
6-70 SWAB TOPICAL
Qty: 1 | Refills: 0 | Status: ACTIVE | COMMUNITY
Start: 2019-01-04 | End: 1900-01-01

## 2019-01-04 RX ORDER — REPAGLINIDE 0.5 MG/1
0.5 TABLET ORAL
Qty: 90 | Refills: 3 | Status: COMPLETED | COMMUNITY
Start: 2018-12-12 | End: 2019-01-04

## 2019-01-04 NOTE — HISTORY OF PRESENT ILLNESS
[FreeTextEntry1] : 54 YO with man with PMHx of pancreatic adenocarcinoma s/p Whipple's procedure in 2017 c/b internal hernia s/p revision in Oct 2018, and Hx of diabetes.  Patient was taking  Actos 30 mg daily and metformin 500 mg daily. During his recent hospitalization in October for revision of his Whipple procedure, his BGL was controlled, his c-peptide was 0.9, and he was discharged on his home medication regimen. At last visit C peptide was noted to be 2.2 with Glucose 295. He was noted to have pedal edema at last visit and Actos was discontinued. \par Saw optho Dec 2018: no retinopathy. No other known macro or microvascular complications. \par \par Pt states that his current diet consists  multiple bowls of rice, fish, and bread throughout the day. \par \par Since last visit he has been taking metformin 1000 mg BID and prandin 0.5mg TIDAC. \par \par At last visit patient had marlee which was placed in [facility] which patient uses to monitor his blood glucose. Marlee reader has not been working so he has not been checking glucose. \par Prior to marlee being broken:\par AM: 150-160\par 2 hours post breakfast: 240-280\par No hypoglycemia\par \par \par

## 2019-01-04 NOTE — PHYSICAL EXAM
[Alert] : alert [No Acute Distress] : no acute distress [Well Developed] : well developed [Normal Sclera/Conjunctiva] : normal sclera/conjunctiva [EOMI] : extra ocular movement intact [No Oral Ulcers] : no oral ulcers [No Neck Mass] : no neck mass was observed [Supple] : the neck was supple [No LAD] : no lymphadenopathy [No Respiratory Distress] : no respiratory distress [Normal Rate and Effort] : normal respiratory rhythm and effort [No Accessory Muscle Use] : no accessory muscle use [Clear to Auscultation] : lungs were clear to auscultation bilaterally [Normal Rate] : heart rate was normal  [Normal S1, S2] : normal S1 and S2 [Regular Rhythm] : with a regular rhythm [No CVA Tenderness] : no ~M costovertebral angle tenderness [No Spinal Tenderness] : no spinal tenderness [Normal Gait] : normal gait [No Joint Swelling] : no joint swelling seen [No Clubbing, Cyanosis] : no clubbing  or cyanosis of the fingernails [Normal Strength/Tone] : muscle strength and tone were normal [No Involuntary Movements] : no involuntary movements were seen [No Rash] : no rash [No Skin Lesions] : no skin lesions [Cranial Nerves Intact] : cranial nerves 2-12 were intact [No Motor Deficits] : the motor exam was normal [No Tremors] : no tremors [No Sensory Deficits] : the sensory exam was normal to light touch and pinprick [Oriented x3] : oriented to person, place, and time [Normal Insight/Judgement] : insight and judgment were intact [Normal Affect] : the affect was normal [Normal Mood] : the mood was normal [Right Foot Was Examined] : right foot ~C was examined [Left Foot Was Examined] : left foot ~C was examined [Normal] : normal [2+] : 2+ in the dorsalis pedis [Foot Ulcers] : no foot ulcers [Abdominal Striae] : no abdominal striae [de-identified] : Dry mucous membranes  [de-identified] : Abd soft, non-tender, non-distended, with scars from prior surgeries

## 2019-01-04 NOTE — REVIEW OF SYSTEMS
[Fatigue] : fatigue [Polydipsia] : polydipsia [Negative] : Heme/Lymph [All other systems negative] : All other systems negative [Fever] : no fever [Chills] : no chills [Visual Field Defect] : no visual field defect [Blurry Vision] : no blurred vision [Eye Pain] : no eye pain [Redness] : no redness  [Dysphagia] : no dysphagia [Dysphonia] : no dysphonia [Neck Pain] : no neck pain [Oral Ulcers] : no oral ulcers [Chest Pain] : no chest pain [Palpitations] : no palpitations [Heart Rate Is Slow] : the heart rate was not slow [Heart Rate Is Fast] : the heart rate was not fast [Leg Claudication] : no intermittent leg claudication [Shortness Of Breath] : no shortness of breath [Wheezing] : no wheezing was heard [Cough] : no cough [SOB on Exertion] : no shortness of breath during exertion [Orthopnea] : no orthopnea [PND] : no PND [Nausea] : no nausea [Vomiting] : no vomiting was observed [Constipation] : no constipation [Abdominal Pain] : no abdominal pain [Hesitancy] : no hesitancy [Dysuria] : no dysuria [Incontinence] : no incontinence [Joint Pain] : no joint pain [Joint Stiffness] : no joint stiffness [Muscle Weakness] : no muscle weakness [Muscle Cramps] : no muscle cramps [Myalgia] : no myalgia  [Back Pain] : no back pain [Acanthosis] : no acanthosis  [Hirsutism] : no hirsutism [Acne] : no acne [Hair Loss] : no hair loss [Dry Skin] : no dry skin [Ulcer] : no ulcer [Headache] : no headaches [Tremors] : no tremors [Pain/Numbness of Digits] : no pain/numbness of digits [Cold Intolerance] : cold tolerant [Heat Intolerance] : heat tolerant [FreeTextEntry2] : irritable

## 2019-01-04 NOTE — ASSESSMENT
[Carbohydrate Consistent Diet] : carbohydrate consistent diet [Diabetes Foot Care] : diabetes foot care [Long Term Vascular Complications] : long term vascular complications of diabetes [Importance of Diet and Exercise] : importance of diet and exercise to improve glycemic control, achieve weight loss and improve cardiovascular health [Self Monitoring of Blood Glucose] : self monitoring of blood glucose [Retinopathy Screening] : Patient was referred to ophthalmology for retinopathy screening [FreeTextEntry1] : 52 YO with hx of pancreatic adenocarcinoma s/p Whipple's and chemo/radiation with uncontrolled type 2 diabetes \par \par 1. Type 2 diabetes\par Patient noted to have fructosamine of 366 which correlates to A1C of 9.0\par Will start Lantus 10U at night and Humalog 3U TIDAC.\par Nurses provided insulin teaching.\par Will also make additional appointment for diabetes education\par Counselled on hypoglycemia management\par Stop prandin and Cont metformin 1000 mg BID.\par Glucometer was prescribed. Check FS AC and HS and bring log to next visit.\par Pt educated about the healthy diet/carbohydrate limits, the importance of medication adherence, BGL monitoring, and daily foot checks.\par \par \par \par 2. Pancreatic insufficiency 2/2 Whipple's Procedure 2/2 pancreatic adenocarcinoma\par -continue on Creon 2 Tabs TID\par \par \par 3. HLD\par -Continue Rosuvastatin 20mg Daily\par -LDL 71 (Sept 2018)\par \par 4. Vit D Deficiency\par -continue Vit D3 5000 IU Daily \par \par Follow up at [facility] clinic in 2 months\par \par \par \par \par \par

## 2019-01-05 ENCOUNTER — TRANSCRIPTION ENCOUNTER (OUTPATIENT)
Age: 54
End: 2019-01-05

## 2019-01-07 DIAGNOSIS — C25.9 MALIGNANT NEOPLASM OF PANCREAS, UNSPECIFIED: ICD-10-CM

## 2019-01-07 DIAGNOSIS — E55.9 VITAMIN D DEFICIENCY, UNSPECIFIED: ICD-10-CM

## 2019-01-07 DIAGNOSIS — E78.5 HYPERLIPIDEMIA, UNSPECIFIED: ICD-10-CM

## 2019-01-07 DIAGNOSIS — E11.65 TYPE 2 DIABETES MELLITUS WITH HYPERGLYCEMIA: ICD-10-CM

## 2019-02-05 ENCOUNTER — APPOINTMENT (OUTPATIENT)
Dept: OPHTHALMOLOGY | Facility: CLINIC | Age: 54
End: 2019-02-05

## 2019-02-14 ENCOUNTER — OUTPATIENT (OUTPATIENT)
Dept: OUTPATIENT SERVICES | Facility: HOSPITAL | Age: 54
LOS: 1 days | Discharge: HOME | End: 2019-02-14

## 2019-02-14 DIAGNOSIS — C25.9 MALIGNANT NEOPLASM OF PANCREAS, UNSPECIFIED: ICD-10-CM

## 2019-02-21 ENCOUNTER — APPOINTMENT (OUTPATIENT)
Dept: HEMATOLOGY ONCOLOGY | Facility: CLINIC | Age: 54
End: 2019-02-21

## 2019-03-07 ENCOUNTER — RESULT REVIEW (OUTPATIENT)
Age: 54
End: 2019-03-07

## 2019-03-07 ENCOUNTER — APPOINTMENT (OUTPATIENT)
Dept: HEMATOLOGY ONCOLOGY | Facility: CLINIC | Age: 54
End: 2019-03-07

## 2019-03-07 ENCOUNTER — APPOINTMENT (OUTPATIENT)
Dept: SURGICAL ONCOLOGY | Facility: CLINIC | Age: 54
End: 2019-03-07
Payer: MEDICAID

## 2019-03-07 VITALS
SYSTOLIC BLOOD PRESSURE: 107 MMHG | WEIGHT: 120 LBS | HEIGHT: 67 IN | OXYGEN SATURATION: 97 % | BODY MASS INDEX: 18.83 KG/M2 | RESPIRATION RATE: 17 BRPM | DIASTOLIC BLOOD PRESSURE: 71 MMHG | TEMPERATURE: 97.5 F | HEART RATE: 78 BPM

## 2019-03-07 VITALS
DIASTOLIC BLOOD PRESSURE: 61 MMHG | RESPIRATION RATE: 18 BRPM | TEMPERATURE: 97.6 F | HEART RATE: 84 BPM | WEIGHT: 120.15 LBS | OXYGEN SATURATION: 99 % | BODY MASS INDEX: 18.82 KG/M2 | SYSTOLIC BLOOD PRESSURE: 98 MMHG

## 2019-03-07 LAB
HCT VFR BLD CALC: 36.3 % — LOW (ref 39–50)
HGB BLD-MCNC: 12 G/DL — LOW (ref 13–17)
MCHC RBC-ENTMCNC: 27.1 PG — SIGNIFICANT CHANGE UP (ref 27–34)
MCHC RBC-ENTMCNC: 32.9 G/DL — SIGNIFICANT CHANGE UP (ref 32–36)
MCV RBC AUTO: 82.3 FL — SIGNIFICANT CHANGE UP (ref 80–100)
PLATELET # BLD AUTO: 235 K/UL — SIGNIFICANT CHANGE UP (ref 150–400)
RBC # BLD: 4.41 M/UL — SIGNIFICANT CHANGE UP (ref 4.2–5.8)
RBC # FLD: 14.8 % — HIGH (ref 10.3–14.5)
WBC # BLD: 4.3 K/UL — SIGNIFICANT CHANGE UP (ref 3.8–10.5)
WBC # FLD AUTO: 4.3 K/UL — SIGNIFICANT CHANGE UP (ref 3.8–10.5)

## 2019-03-07 PROCEDURE — 99215 OFFICE O/P EST HI 40 MIN: CPT

## 2019-03-07 NOTE — REVIEW OF SYSTEMS
[Negative] : Endocrine [Shortness Of Breath] : no shortness of breath [Wheezing] : no wheezing [Cough] : no cough

## 2019-03-07 NOTE — ASSESSMENT
[FreeTextEntry1] : Pt is a 52 yo male w/ pmhx diabetes and pancreatic adenocarcinoma s/p Whipple's procedure in 2017 and adjuvant chemoradiation now in remission.\par \par 1) Pancreatic Adenocarcinoma- initial T2N2 disease s/p Whipple's w/ R1 resection and adjuvant chemo (gem + capecitabine) and radiation \par - PET in July with no recurrence of disease per [place] reports\par - CT A/P without any evidence of disease 10/18; exploratory laparotomy at that time for SBO and revision of J-J anastomosis with benign path\par - Recent CT A/P results 12/18 reviewed with patient: no evidence of tumor recurrence \par - Most recent Ca 19-9 <1, however unsure of baseline values at time of diagnosis\par - will repeat blood work today including Ca 19-9 level\par - plan on surveillance with imaging q4 month - ordered for end of April \par - RTC after repeat scans \par \par 2) DMII- elevated sugars previously, now following with endocrine\par - checking HgA1C per patient request \par - has f/u with endo in 2 weeks ; advised to call clinic if needs refills on insulin medication\par \par Discussed with Dr. Oswald,\par Sarah Garrett, PGY IV

## 2019-03-07 NOTE — HISTORY OF PRESENT ILLNESS
[de-identified] : Pt is a 52 yo male w/ pmhx diabetes and pancreatic adenocarcinoma s/p Whipple's procedure in 2017 and adjuvant chemoradiation who presents for initial visit. He was noted to have obstructive jaundice late in 2017. He underwent MRI/MRCP which showed a 3.8 mass in the pancreatic head and uncinate process. He followed up in [facility] and underwent a whipple's resection in 11/2017. 4/29 nodes examined had evidence of cancer with some extranodal invasion. He had T2N2 disease on presentation and had R1 resection. He subsequently underwent concurrent chemoradiation. He completed 2 cycles of gemcitabine and capecitabine. Interim PET showed no disease, and he was then treated with 45 Gy radiation in 25 fractions to tumor bed and regional lymphatics with concurrent capecitabine. He followed this with 2 more cycles of gemcitabine and capecitabine. PET/CT after treatment in 7/2018 showed no recurrence of disease. He then came to the [place] and was admitted to [facility] 10/2018 with small bowel obstruction. He is status post laparotomy and reduction of an internal hernia and revision of a strictured jejunal-jejunal anastomosis on 10/17/18. Pathology was benign. \par  [de-identified] : Pt is increasing weight and overall feeling well. Slight gassy feeling at times with eating, but otherwise no abdominal pain/fevers/chills/weight loss/headache. Notes sugars fluctuate with morning readings  and then up to 250s later in the day. Is planning on going to [facility] next week.

## 2019-03-07 NOTE — PHYSICAL EXAM
[Restricted in physically strenuous activity but ambulatory and able to carry out work of a light or sedentary nature] : Status 1- Restricted in physically strenuous activity but ambulatory and able to carry out work of a light or sedentary nature, e.g., light house work, office work [Thin] : thin [Normal] : grossly intact [de-identified] : frail gentlemen  [de-identified] : surgical scars clean

## 2019-03-11 ENCOUNTER — APPOINTMENT (OUTPATIENT)
Dept: OPHTHALMOLOGY | Facility: CLINIC | Age: 54
End: 2019-03-11
Payer: MEDICAID

## 2019-03-11 PROCEDURE — 92082 INTERMEDIATE VISUAL FIELD XM: CPT

## 2019-03-11 PROCEDURE — 92133 CPTRZD OPH DX IMG PST SGM ON: CPT

## 2019-03-11 PROCEDURE — 92012 INTRM OPH EXAM EST PATIENT: CPT

## 2019-03-14 NOTE — HISTORY OF PRESENT ILLNESS
[de-identified] : Mr. Paula returns to see me to discus his diet and lifestyle choices following a revision of his rosalba-en-y gastro-jejunostomy following Whipple surgery 1 year ago.\par \par He reports that he is able to tolerate all foods but that he has 4-5 bowel movements a day despite increasing his creon dose. He reports gaining weight despite this. He is also concerned about his glucose levels and whether he needs insulin.\par \par Mr. Paula returns again to discuss his plan for long-term follow up and his travel and exercise limitations, if any.\par \par 3-7-19: Mr. Paula returns now for 5-month follow up. He is doing well. He continues to eat as he pleases. His bowel movements are decreasing in frequency, and he is gaining weight. His undergoing management of his DM with Dr. dEmonds of endocrinology. He is undergoing Q4M surveillance imaging and tumor markers in the medical oncology clinic. He wishes to know if he can take a long international trip now.

## 2019-03-14 NOTE — PHYSICAL EXAM
[Normal] : supple, no neck mass and thyroid not enlarged [Normal Neck Lymph Nodes] : normal neck lymph nodes  [Normal Supraclavicular Lymph Nodes] : normal supraclavicular lymph nodes [Normal Groin Lymph Nodes] : normal groin lymph nodes [Normal Axillary Lymph Nodes] : normal axillary lymph nodes [Normal] : oriented to person, place and time, with appropriate affect [de-identified] : midline laparotomy incision well-healed. Old [place] incision healed, without hernia.

## 2019-03-14 NOTE — ASSESSMENT
[FreeTextEntry1] : 53 year old man doing well s/p revision of J-J anastomosis from Whipple performed for T2N2 disease in outside country. No travel restrictions from my standpoint. Will continue to follow surveillance imaging and tumor markers. RTC in 6 months to see me.

## 2019-03-22 ENCOUNTER — APPOINTMENT (OUTPATIENT)
Dept: ENDOCRINOLOGY | Facility: HOSPITAL | Age: 54
End: 2019-03-22

## 2019-04-02 NOTE — ASSESSMENT
[FreeTextEntry1] : Pt is a 54 yo male w/ pmhx diabetes and pancreatic adenocarcinoma s/p Whipple's procedure in 2017 and adjuvant chemoradiation now in remission who presents for initial visit.\par \par 1) Pancreatic Adenocarcinoma- initial T2N2 disease s/p Whipple's w/ R1 resection and adjuvant chemo (gem + capecitabine) and radiation \par - PET in July with no recurrence of disease per [place] reports\par - CT A/P without any evidence of disease 10/18; exploratory laparotomy at that time for SBO and revision of J-J anastomosis with benign path\par - Recent CT A/P results reviewed with patient: no evidence of tumor recurrence \par - Most recent Ca 19-9 <1, however unsure of baseline values at time of diagnosis\par - will plan on surveillance with imaging q4 month and lab checks \par - RTC after repeat scans \par \par 2) Cough- recent CXR clear\par - likely viral illness\par - prescribed tessalon pearles, will continue to monitor \par \par 3) DMII- elevated sugars previously, now following with endocrine\par - needs blood glucose monitor\par - has f/u with endo in 2 weeks \par \par Discussed with Dr. Moon,\par Sarah Garrett, PGY IV Consent 3/Introductory Paragraph: I gave the patient a chance to ask questions they had about the procedure.  Following this I explained the Mohs procedure and consent was obtained. The risks, benefits and alternatives to therapy were discussed in detail. Specifically, the risks of infection, scarring, bleeding, prolonged wound healing, incomplete removal, allergy to anesthesia, nerve injury and recurrence were addressed. Prior to the procedure, the treatment site was clearly identified and confirmed by the patient. All components of Universal Protocol/PAUSE Rule completed.

## 2019-05-02 ENCOUNTER — APPOINTMENT (OUTPATIENT)
Dept: SURGICAL ONCOLOGY | Facility: CLINIC | Age: 54
End: 2019-05-02

## 2019-05-10 ENCOUNTER — APPOINTMENT (OUTPATIENT)
Dept: CT IMAGING | Facility: IMAGING CENTER | Age: 54
End: 2019-05-10

## 2019-05-16 ENCOUNTER — APPOINTMENT (OUTPATIENT)
Dept: SURGICAL ONCOLOGY | Facility: CLINIC | Age: 54
End: 2019-05-16
Payer: MEDICAID

## 2019-05-16 VITALS
BODY MASS INDEX: 18.21 KG/M2 | HEART RATE: 84 BPM | WEIGHT: 116 LBS | SYSTOLIC BLOOD PRESSURE: 91 MMHG | HEIGHT: 67 IN | DIASTOLIC BLOOD PRESSURE: 65 MMHG | OXYGEN SATURATION: 98 %

## 2019-05-16 PROCEDURE — 99214 OFFICE O/P EST MOD 30 MIN: CPT

## 2019-05-16 NOTE — ASSESSMENT
[FreeTextEntry1] : 53 year old man s/p whipple with rosalba-en-y reconstruction for T2N2 pancreas adenocarcinoma in [facility] nearly 2 years ago. Currently GENO. Needs 6 months surveillance CT scan which I have ordered. Needs to continue close follow up with his endocrinologist, Dr Edmonds concerning his rise in A1c. I told him he is OK to continue daily fasting during Ramadan and he has no travel restrictions. RTC 6 months with CT and  at that time.

## 2019-05-16 NOTE — PHYSICAL EXAM
[Normal] : supple, no neck mass and thyroid not enlarged [Normal Supraclavicular Lymph Nodes] : normal supraclavicular lymph nodes [Normal Neck Lymph Nodes] : normal neck lymph nodes  [Normal Groin Lymph Nodes] : normal groin lymph nodes [Normal Axillary Lymph Nodes] : normal axillary lymph nodes [Normal] : oriented to person, place and time, with appropriate affect [de-identified] : midline laparotomy incision well-healed. [place] incision healed, without hernia.

## 2019-05-16 NOTE — HISTORY OF PRESENT ILLNESS
[de-identified] : Mr. Paula returns to see me to discus his diet and lifestyle choices following a revision of his rosalba-en-y gastro-jejunostomy following Whipple surgery 1 year ago.\par \par He reports that he is able to tolerate all foods but that he has 4-5 bowel movements a day despite increasing his creon dose. He reports gaining weight despite this. He is also concerned about his glucose levels and whether he needs insulin.\par \par Mr. Paula returns again to discuss his plan for long-term follow up and his travel and exercise limitations, if any.\par \par 3-7-19: Mr. Paula returns now for 5-month follow up. He is doing well. He continues to eat as he pleases. His bowel movements are decreasing in frequency, and he is gaining weight. His undergoing management of his DM with Dr. Edmonds of endocrinology. He is undergoing Q4M surveillance imaging and tumor markers in the medical oncology clinic. He wishes to know if he can take a long international trip now.\par \par INTERIM 5-16-19: Mr. Paula returns to get help in re-ordering the CT scan he was supposed to undergo this spring. Also his HA1c is rising and he wants to know if he should change his fasting pattern during Ramadan.

## 2019-05-21 ENCOUNTER — APPOINTMENT (OUTPATIENT)
Dept: CT IMAGING | Facility: IMAGING CENTER | Age: 54
End: 2019-05-21
Payer: MEDICAID

## 2019-05-21 ENCOUNTER — OUTPATIENT (OUTPATIENT)
Dept: OUTPATIENT SERVICES | Facility: HOSPITAL | Age: 54
LOS: 1 days | End: 2019-05-21
Payer: MEDICAID

## 2019-05-21 DIAGNOSIS — C25.9 MALIGNANT NEOPLASM OF PANCREAS, UNSPECIFIED: ICD-10-CM

## 2019-05-21 PROCEDURE — 74177 CT ABD & PELVIS W/CONTRAST: CPT | Mod: 26

## 2019-05-21 PROCEDURE — 74177 CT ABD & PELVIS W/CONTRAST: CPT

## 2019-05-21 PROCEDURE — 82565 ASSAY OF CREATININE: CPT

## 2019-05-24 ENCOUNTER — RX RENEWAL (OUTPATIENT)
Age: 54
End: 2019-05-24

## 2019-09-09 ENCOUNTER — APPOINTMENT (OUTPATIENT)
Dept: OPHTHALMOLOGY | Facility: CLINIC | Age: 54
End: 2019-09-09
Payer: MEDICAID

## 2019-09-09 ENCOUNTER — NON-APPOINTMENT (OUTPATIENT)
Age: 54
End: 2019-09-09

## 2019-09-09 PROCEDURE — 92012 INTRM OPH EXAM EST PATIENT: CPT

## 2019-09-09 PROCEDURE — 92083 EXTENDED VISUAL FIELD XM: CPT

## 2019-09-11 ENCOUNTER — OTHER (OUTPATIENT)
Age: 54
End: 2019-09-11

## 2019-09-12 ENCOUNTER — RESULT REVIEW (OUTPATIENT)
Age: 54
End: 2019-09-12

## 2019-09-12 ENCOUNTER — APPOINTMENT (OUTPATIENT)
Dept: HEMATOLOGY ONCOLOGY | Facility: CLINIC | Age: 54
End: 2019-09-12

## 2019-09-12 ENCOUNTER — OUTPATIENT (OUTPATIENT)
Dept: OUTPATIENT SERVICES | Facility: HOSPITAL | Age: 54
LOS: 1 days | Discharge: HOME | End: 2019-09-12

## 2019-09-12 DIAGNOSIS — C25.9 MALIGNANT NEOPLASM OF PANCREAS, UNSPECIFIED: ICD-10-CM

## 2019-09-12 LAB
BASOPHILS # BLD AUTO: 0.1 K/UL — SIGNIFICANT CHANGE UP (ref 0–0.2)
BASOPHILS NFR BLD AUTO: 2.4 % — HIGH (ref 0–2)
EOSINOPHIL # BLD AUTO: 0.1 K/UL — SIGNIFICANT CHANGE UP (ref 0–0.5)
EOSINOPHIL NFR BLD AUTO: 1.9 % — SIGNIFICANT CHANGE UP (ref 0–6)
HCT VFR BLD CALC: 39.2 % — SIGNIFICANT CHANGE UP (ref 39–50)
HGB BLD-MCNC: 12.5 G/DL — LOW (ref 13–17)
LYMPHOCYTES # BLD AUTO: 1.5 K/UL — SIGNIFICANT CHANGE UP (ref 1–3.3)
LYMPHOCYTES # BLD AUTO: 29 % — SIGNIFICANT CHANGE UP (ref 13–44)
MCHC RBC-ENTMCNC: 27.3 PG — SIGNIFICANT CHANGE UP (ref 27–34)
MCHC RBC-ENTMCNC: 31.9 G/DL — LOW (ref 32–36)
MCV RBC AUTO: 85.5 FL — SIGNIFICANT CHANGE UP (ref 80–100)
MONOCYTES # BLD AUTO: 0.4 K/UL — SIGNIFICANT CHANGE UP (ref 0–0.9)
MONOCYTES NFR BLD AUTO: 8.5 % — SIGNIFICANT CHANGE UP (ref 2–14)
NEUTROPHILS # BLD AUTO: 3 K/UL — SIGNIFICANT CHANGE UP (ref 1.8–7.4)
NEUTROPHILS NFR BLD AUTO: 58.1 % — SIGNIFICANT CHANGE UP (ref 43–77)
PLATELET # BLD AUTO: 202 K/UL — SIGNIFICANT CHANGE UP (ref 150–400)
RBC # BLD: 4.58 M/UL — SIGNIFICANT CHANGE UP (ref 4.2–5.8)
RBC # FLD: 14 % — SIGNIFICANT CHANGE UP (ref 10.3–14.5)
WBC # BLD: 5.2 K/UL — SIGNIFICANT CHANGE UP (ref 3.8–10.5)
WBC # FLD AUTO: 5.2 K/UL — SIGNIFICANT CHANGE UP (ref 3.8–10.5)

## 2019-09-27 ENCOUNTER — OUTPATIENT (OUTPATIENT)
Dept: OUTPATIENT SERVICES | Facility: HOSPITAL | Age: 54
LOS: 1 days | End: 2019-09-27

## 2019-09-27 ENCOUNTER — APPOINTMENT (OUTPATIENT)
Dept: ENDOCRINOLOGY | Facility: CLINIC | Age: 54
End: 2019-09-27
Payer: MEDICAID

## 2019-09-27 VITALS
BODY MASS INDEX: 20.14 KG/M2 | WEIGHT: 128.31 LBS | RESPIRATION RATE: 15 BRPM | DIASTOLIC BLOOD PRESSURE: 64 MMHG | OXYGEN SATURATION: 98 % | HEIGHT: 67 IN | SYSTOLIC BLOOD PRESSURE: 98 MMHG | HEART RATE: 92 BPM

## 2019-09-27 DIAGNOSIS — E11.9 TYPE 2 DIABETES MELLITUS WITHOUT COMPLICATIONS: ICD-10-CM

## 2019-09-27 DIAGNOSIS — K86.89 OTHER SPECIFIED DISEASES OF PANCREAS: ICD-10-CM

## 2019-09-27 DIAGNOSIS — C25.9 MALIGNANT NEOPLASM OF PANCREAS, UNSPECIFIED: ICD-10-CM

## 2019-09-27 DIAGNOSIS — E16.2 HYPOGLYCEMIA, UNSPECIFIED: ICD-10-CM

## 2019-09-27 DIAGNOSIS — E78.5 HYPERLIPIDEMIA, UNSPECIFIED: ICD-10-CM

## 2019-09-27 LAB — GLUCOSE BLDC GLUCOMTR-MCNC: 154

## 2019-09-27 PROCEDURE — 99214 OFFICE O/P EST MOD 30 MIN: CPT | Mod: GC

## 2019-09-27 RX ORDER — FLASH GLUCOSE SENSOR
KIT MISCELLANEOUS
Qty: 1 | Refills: 3 | Status: ACTIVE | COMMUNITY
Start: 2019-09-27 | End: 1900-01-01

## 2019-09-29 ENCOUNTER — FORM ENCOUNTER (OUTPATIENT)
Age: 54
End: 2019-09-29

## 2019-09-30 ENCOUNTER — APPOINTMENT (OUTPATIENT)
Dept: CT IMAGING | Facility: IMAGING CENTER | Age: 54
End: 2019-09-30
Payer: MEDICAID

## 2019-09-30 ENCOUNTER — OUTPATIENT (OUTPATIENT)
Dept: OUTPATIENT SERVICES | Facility: HOSPITAL | Age: 54
LOS: 1 days | End: 2019-09-30
Payer: MEDICAID

## 2019-09-30 DIAGNOSIS — C25.9 MALIGNANT NEOPLASM OF PANCREAS, UNSPECIFIED: ICD-10-CM

## 2019-09-30 PROCEDURE — 74177 CT ABD & PELVIS W/CONTRAST: CPT | Mod: 26

## 2019-09-30 PROCEDURE — 74177 CT ABD & PELVIS W/CONTRAST: CPT

## 2019-10-01 ENCOUNTER — APPOINTMENT (OUTPATIENT)
Dept: OPHTHALMOLOGY | Facility: CLINIC | Age: 54
End: 2019-10-01

## 2019-10-02 ENCOUNTER — NON-APPOINTMENT (OUTPATIENT)
Age: 54
End: 2019-10-02

## 2019-10-02 ENCOUNTER — APPOINTMENT (OUTPATIENT)
Dept: OPHTHALMOLOGY | Facility: CLINIC | Age: 54
End: 2019-10-02
Payer: MEDICAID

## 2019-10-02 PROCEDURE — 76514 ECHO EXAM OF EYE THICKNESS: CPT

## 2019-10-02 PROCEDURE — 92083 EXTENDED VISUAL FIELD XM: CPT

## 2019-10-02 PROCEDURE — 92012 INTRM OPH EXAM EST PATIENT: CPT

## 2019-10-04 ENCOUNTER — APPOINTMENT (OUTPATIENT)
Dept: OPHTHALMOLOGY | Facility: CLINIC | Age: 54
End: 2019-10-04
Payer: MEDICAID

## 2019-10-04 ENCOUNTER — NON-APPOINTMENT (OUTPATIENT)
Age: 54
End: 2019-10-04

## 2019-10-04 PROCEDURE — 92014 COMPRE OPH EXAM EST PT 1/>: CPT

## 2019-10-08 ENCOUNTER — APPOINTMENT (OUTPATIENT)
Dept: SURGICAL ONCOLOGY | Facility: CLINIC | Age: 54
End: 2019-10-08
Payer: MEDICAID

## 2019-10-08 VITALS
HEART RATE: 66 BPM | HEIGHT: 67 IN | DIASTOLIC BLOOD PRESSURE: 72 MMHG | WEIGHT: 129 LBS | BODY MASS INDEX: 20.25 KG/M2 | SYSTOLIC BLOOD PRESSURE: 105 MMHG

## 2019-10-08 DIAGNOSIS — E11.49 TYPE 2 DIABETES MELLITUS WITH OTHER DIABETIC NEUROLOGICAL COMPLICATION: ICD-10-CM

## 2019-10-08 PROCEDURE — 99215 OFFICE O/P EST HI 40 MIN: CPT

## 2019-10-08 NOTE — HISTORY OF PRESENT ILLNESS
[de-identified] : Mr. Paula returns to see me to discus his diet and lifestyle choices following a revision of his rosalba-en-y gastro-jejunostomy following Whipple surgery 1 year ago.\par \par He reports that he is able to tolerate all foods but that he has 4-5 bowel movements a day despite increasing his creon dose. He reports gaining weight despite this. He is also concerned about his glucose levels and whether he needs insulin.\par \par Mr. Paula returns again to discuss his plan for long-term follow up and his travel and exercise limitations, if any.\par \par 3-7-19: Mr. Paula returns now for 5-month follow up. He is doing well. He continues to eat as he pleases. His bowel movements are decreasing in frequency, and he is gaining weight. His undergoing management of his DM with Dr. Edmonds of endocrinology. He is undergoing Q4M surveillance imaging and tumor markers in the medical oncology clinic. He wishes to know if he can take a long international trip now.\par \par INTERIM 5-16-19: Mr. Paula returns to get help in re-ordering the CT scan he was supposed to undergo this spring. Also his HA1c is rising and he wants to know if he should change his fasting pattern during Ramadan.\par \par INTERIM 10-8-19: Mr. Paula returns for 5 month follow up. He reports improved health and weight gain with good appetite and improving exercise tolerance. He does report difficulty with his blood sugar variance for which he continues f/u with endocriniology. His CT scan from 9/30 shows GENO. His  from last month was <2.

## 2019-10-08 NOTE — HISTORY OF PRESENT ILLNESS
[FreeTextEntry1] : 53 YO with man with PMHx of pancreatic adenocarcinoma s/p Whipple's procedure in 2017 c/b internal hernia s/p revision in Oct 2018, and Hx of diabetes.  Patient was taking  Actos 30 mg daily and metformin 500 mg daily. During his recent hospitalization in October for revision of his Whipple procedure, his BGL was controlled, his c-peptide was 0.9, and he was discharged on his home medication regimen. At last visit C peptide was noted to be 2.2 with Glucose 295. He was noted to have pedal edema at last visit and Actos was discontinued. \par \par On lantus 15 units qhs, and humalog 5 units TID. Reports has been exercising more and was having hypos so decreased to 10 units  lantus and humalog 3 units TID. Was seeing a doctor in [facility]. 3 mos ago was started on pioglitazone 30mg daily again.  Also on metformin 1000mg BID.\par Uses a freestyle azeem from [facility]. As per log, post-prandial  after breakfast to >250. Variable BG with lunch and dinner. AM: 150-160\par 2 hours post breakfast: 240-280\par No hypoglycemia\par Reports improvement in appetite. Pt states that his current diet consists  multiple bowls of rice, fish, and bread throughout the day. \par \par Saw optho Dec 2018: no retinopathy. No other known macro or microvascular complications. \par \par \par \par \par

## 2019-10-08 NOTE — ASSESSMENT
[FreeTextEntry1] : 54 year old man 2 years s/p Whipple for T2N2 adenocarcinoma in [facility]. S/P adjuvant chemotherapy and radiation. Now 1 year s/p revision jejuno-jejunostomy for a stricture. Doing well. GENO by exam, CT, and . Return in 6 months with repeat CT and .

## 2019-10-08 NOTE — REVIEW OF SYSTEMS
[Negative] : Heme/Lymph [All other systems negative] : All other systems negative [Fatigue] : no fatigue [Fever] : no fever [Chills] : no chills [Visual Field Defect] : no visual field defect [Blurry Vision] : no blurred vision [Eye Pain] : no eye pain [Redness] : no redness  [Dysphagia] : no dysphagia [Dysphonia] : no dysphonia [Neck Pain] : no neck pain [Oral Ulcers] : no oral ulcers [Chest Pain] : no chest pain [Palpitations] : no palpitations [Heart Rate Is Slow] : the heart rate was not slow [Heart Rate Is Fast] : the heart rate was not fast [Leg Claudication] : no intermittent leg claudication [Shortness Of Breath] : no shortness of breath [Cough] : no cough [Wheezing] : no wheezing was heard [SOB on Exertion] : no shortness of breath during exertion [Orthopnea] : no orthopnea [PND] : no PND [Nausea] : no nausea [Vomiting] : no vomiting was observed [Constipation] : no constipation [Hesitancy] : no hesitancy [Abdominal Pain] : no abdominal pain [Dysuria] : no dysuria [Incontinence] : no incontinence [Joint Pain] : no joint pain [Joint Stiffness] : no joint stiffness [Muscle Weakness] : no muscle weakness [Muscle Cramps] : no muscle cramps [Myalgia] : no myalgia  [Back Pain] : no back pain [Acanthosis] : no acanthosis  [Hirsutism] : no hirsutism [Acne] : no acne [Hair Loss] : no hair loss [Ulcer] : no ulcer [Dry Skin] : no dry skin [Headache] : no headaches [Tremors] : no tremors [Pain/Numbness of Digits] : no pain/numbness of digits [Polydipsia] : no polydipsia [Heat Intolerance] : heat tolerant [Cold Intolerance] : cold tolerant

## 2019-10-08 NOTE — ASSESSMENT
[Carbohydrate Consistent Diet] : carbohydrate consistent diet [Hypoglycemia Management] : hypoglycemia management [Diabetes Foot Care] : diabetes foot care [Long Term Vascular Complications] : long term vascular complications of diabetes [Self Monitoring of Blood Glucose] : self monitoring of blood glucose [Action and use of Insulin] : action and use of short and long-acting insulin [Importance of Diet and Exercise] : importance of diet and exercise to improve glycemic control, achieve weight loss and improve cardiovascular health [Insulin Self-Administration] : insulin self-administration [Retinopathy Screening] : Patient was referred to ophthalmology for retinopathy screening [FreeTextEntry1] : 55 YO with hx of pancreatic adenocarcinoma s/p Whipple's and chemo/radiation with uncontrolled type 2 diabetes \par \par 1. Type 2 diabetes (still has residual islet cell function/ c-peptide c/w pancreatic remnant)\par A1C 8.4% Sept 2019. Main problem is post breakfast hyperglycemia.\par Based on azeem BG logs, will c/w lantus 10 units qhs and adjust humalog to 5-3-3. \par Can dc actos at this time since not clear if actually helping. Can c/w metformin 1000mg BID for now.\par Counselled on hypoglycemia management\par Pt educated about the healthy diet/carbohydrate limits, the importance of medication adherence, BGL monitoring, and daily foot checks.\par \par 2. HLD\par -Continue Rosuvastatin 20mg Daily\par -LDL 71 (Sept 2018)\par \par 4. Vit D Deficiency\par -continue Vit D3 5000 IU Daily .  Will check vitamin D at next visit.\par \par Follow up in 3 mos\par \par D/W Dr. Ortiz\par \par \par \par \par \par

## 2019-10-08 NOTE — PHYSICAL EXAM
[Alert] : alert [No Acute Distress] : no acute distress [Normal Sclera/Conjunctiva] : normal sclera/conjunctiva [Well Developed] : well developed [No Neck Mass] : no neck mass was observed [EOMI] : extra ocular movement intact [No Oral Ulcers] : no oral ulcers [Supple] : the neck was supple [No Respiratory Distress] : no respiratory distress [No LAD] : no lymphadenopathy [Normal Rate and Effort] : normal respiratory rhythm and effort [No Accessory Muscle Use] : no accessory muscle use [Clear to Auscultation] : lungs were clear to auscultation bilaterally [Normal S1, S2] : normal S1 and S2 [Normal Rate] : heart rate was normal  [Regular Rhythm] : with a regular rhythm [No CVA Tenderness] : no ~M costovertebral angle tenderness [Normal Gait] : normal gait [No Spinal Tenderness] : no spinal tenderness [No Joint Swelling] : no joint swelling seen [No Clubbing, Cyanosis] : no clubbing  or cyanosis of the fingernails [No Involuntary Movements] : no involuntary movements were seen [Normal Strength/Tone] : muscle strength and tone were normal [No Rash] : no rash [No Skin Lesions] : no skin lesions [Right Foot Was Examined] : right foot ~C was examined [Left Foot Was Examined] : left foot ~C was examined [Normal] : normal [2+] : 2+ in the dorsalis pedis [Cranial Nerves Intact] : cranial nerves 2-12 were intact [No Motor Deficits] : the motor exam was normal [No Tremors] : no tremors [Oriented x3] : oriented to person, place, and time [No Sensory Deficits] : the sensory exam was normal to light touch and pinprick [Normal Insight/Judgement] : insight and judgment were intact [Normal Affect] : the affect was normal [Normal Mood] : the mood was normal [Foot Ulcers] : no foot ulcers [Abdominal Striae] : no abdominal striae [de-identified] : Abd soft, non-tender, non-distended, with scars from prior surgeries

## 2019-10-08 NOTE — PHYSICAL EXAM
[Normal] : supple, no neck mass and thyroid not enlarged [Normal Neck Lymph Nodes] : normal neck lymph nodes  [Normal Supraclavicular Lymph Nodes] : normal supraclavicular lymph nodes [Normal Groin Lymph Nodes] : normal groin lymph nodes [Normal Axillary Lymph Nodes] : normal axillary lymph nodes [Normal] : grossly intact [de-identified] : midline laparotomy incision well-healed. [place] incision healed, without hernia.

## 2019-10-09 ENCOUNTER — OUTPATIENT (OUTPATIENT)
Dept: OUTPATIENT SERVICES | Facility: HOSPITAL | Age: 54
LOS: 1 days | Discharge: HOME | End: 2019-10-09

## 2019-10-09 ENCOUNTER — APPOINTMENT (OUTPATIENT)
Age: 54
End: 2019-10-09
Payer: MEDICAID

## 2019-10-09 VITALS
SYSTOLIC BLOOD PRESSURE: 110 MMHG | DIASTOLIC BLOOD PRESSURE: 75 MMHG | WEIGHT: 130.51 LBS | HEART RATE: 75 BPM | TEMPERATURE: 97.4 F | RESPIRATION RATE: 16 BRPM | OXYGEN SATURATION: 100 % | BODY MASS INDEX: 20.44 KG/M2

## 2019-10-09 DIAGNOSIS — C25.9 MALIGNANT NEOPLASM OF PANCREAS, UNSPECIFIED: ICD-10-CM

## 2019-10-09 PROCEDURE — 99213 OFFICE O/P EST LOW 20 MIN: CPT

## 2019-10-09 RX ORDER — OMEPRAZOLE 20 MG/1
20 TABLET, DELAYED RELEASE ORAL
Qty: 60 | Refills: 3 | Status: ACTIVE | COMMUNITY
Start: 2018-11-15 | End: 1900-01-01

## 2019-10-12 NOTE — ASSESSMENT
[Curative] : Goals of care discussed with patient: Curative [Palliative Care Plan] : not applicable at this time [FreeTextEntry1] : - discussed healthy diet, exercise\par - will need screening colonoscopy

## 2019-10-12 NOTE — HISTORY OF PRESENT ILLNESS
[Disease: _____________________] : Disease: [unfilled] [T: ___] : T[unfilled] [N: ___] : N[unfilled] [M: ___] : M[unfilled] [AJCC Stage: ____] : AJCC Stage: [unfilled] [de-identified] : 54 M developed painless jaundice in 2017. An US performed in [facility] revealed a pancreatic mass. One year prior to this, he was diagnosed with DM. + 50 lb weight loss during this time. His father  of pancreatic cancer in his 70s. He traveled to [facility] and underwent an MRI Abd on 10/25/17 showed a 3.8 cm mass in the pancreatic head abutting the lateral wall of SMA and anterior wall of PV. PET/CT was negative for malignancy outside of the pancreas. Initial recommendation for was neoadjuvant chemotherapy. Patient sought a 2nd opinion from a surgeon and underwent an R1 Whipple with a rosalba-en-Y on 10/31/17. This was followed by 2 cycles of gemcitabine and capecitabine. Interim PET showed no disease, and he was then treated with 45 Gy radiation in 25 fractions to tumor bed and regional lymphatics with concurrent capecitabine. He followed this with 2 more cycles of gemcitabine and capecitabine. PET/CT after treatment in 2018 showed no recurrence of disease. [place] the [place] and was admitted to [facility] 10/2018 with small bowel obstruction. He is status post laparotomy and reduction of an internal hernia and revision of a strictured jejunal-jejunal anastomosis on 10/17/18. Pathology was benign. He has been on surveillance without evidence of recurrent disease. \par \par 19: CT A/P: negative for metastasis  [de-identified] : well differentiated adenocarcinoma  [de-identified] : CA 19.9 <1  [FreeTextEntry1] : surveillance  [de-identified] : He feels well. Denies any c/o. He is going to be traveling to [facility] in the next couple of weeks for 1.5 mos. \par Weight is stable. Good appetite. Moving bowels normally. \par

## 2019-12-17 ENCOUNTER — OUTPATIENT (OUTPATIENT)
Dept: OUTPATIENT SERVICES | Facility: HOSPITAL | Age: 54
LOS: 1 days | Discharge: HOME | End: 2019-12-17

## 2019-12-17 DIAGNOSIS — C25.9 MALIGNANT NEOPLASM OF PANCREAS, UNSPECIFIED: ICD-10-CM

## 2019-12-23 ENCOUNTER — APPOINTMENT (OUTPATIENT)
Dept: HEMATOLOGY ONCOLOGY | Facility: CLINIC | Age: 54
End: 2019-12-23

## 2020-01-06 ENCOUNTER — APPOINTMENT (OUTPATIENT)
Dept: HEMATOLOGY ONCOLOGY | Facility: CLINIC | Age: 55
End: 2020-01-06
Payer: MEDICAID

## 2020-01-06 ENCOUNTER — RESULT REVIEW (OUTPATIENT)
Age: 55
End: 2020-01-06

## 2020-01-06 VITALS
RESPIRATION RATE: 16 BRPM | SYSTOLIC BLOOD PRESSURE: 108 MMHG | TEMPERATURE: 97.5 F | DIASTOLIC BLOOD PRESSURE: 67 MMHG | OXYGEN SATURATION: 100 % | HEART RATE: 70 BPM | WEIGHT: 129.41 LBS | BODY MASS INDEX: 20.27 KG/M2

## 2020-01-06 DIAGNOSIS — M54.2 CERVICALGIA: ICD-10-CM

## 2020-01-06 LAB
BASOPHILS # BLD AUTO: 0 K/UL — SIGNIFICANT CHANGE UP (ref 0–0.2)
BASOPHILS NFR BLD AUTO: 1 % — SIGNIFICANT CHANGE UP (ref 0–2)
EOSINOPHIL # BLD AUTO: 0.1 K/UL — SIGNIFICANT CHANGE UP (ref 0–0.5)
EOSINOPHIL NFR BLD AUTO: 5 % — SIGNIFICANT CHANGE UP (ref 0–6)
HCT VFR BLD CALC: 38.5 % — LOW (ref 39–50)
HGB BLD-MCNC: 12.7 G/DL — LOW (ref 13–17)
LYMPHOCYTES # BLD AUTO: 2 K/UL — SIGNIFICANT CHANGE UP (ref 1–3.3)
LYMPHOCYTES # BLD AUTO: 39 % — SIGNIFICANT CHANGE UP (ref 13–44)
MCHC RBC-ENTMCNC: 27.8 PG — SIGNIFICANT CHANGE UP (ref 27–34)
MCHC RBC-ENTMCNC: 32.9 G/DL — SIGNIFICANT CHANGE UP (ref 32–36)
MCV RBC AUTO: 84.5 FL — SIGNIFICANT CHANGE UP (ref 80–100)
MONOCYTES # BLD AUTO: 0.4 K/UL — SIGNIFICANT CHANGE UP (ref 0–0.9)
MONOCYTES NFR BLD AUTO: 4 % — SIGNIFICANT CHANGE UP (ref 2–14)
NEUTROPHILS # BLD AUTO: 2.3 K/UL — SIGNIFICANT CHANGE UP (ref 1.8–7.4)
NEUTROPHILS NFR BLD AUTO: 51 % — SIGNIFICANT CHANGE UP (ref 43–77)
PLAT MORPH BLD: NORMAL — SIGNIFICANT CHANGE UP
PLATELET # BLD AUTO: 199 K/UL — SIGNIFICANT CHANGE UP (ref 150–400)
RBC # BLD: 4.55 M/UL — SIGNIFICANT CHANGE UP (ref 4.2–5.8)
RBC # FLD: 13.2 % — SIGNIFICANT CHANGE UP (ref 10.3–14.5)
RBC BLD AUTO: SIGNIFICANT CHANGE UP
WBC # BLD: 4.8 K/UL — SIGNIFICANT CHANGE UP (ref 3.8–10.5)
WBC # FLD AUTO: 4.8 K/UL — SIGNIFICANT CHANGE UP (ref 3.8–10.5)

## 2020-01-06 PROCEDURE — 99214 OFFICE O/P EST MOD 30 MIN: CPT

## 2020-01-06 NOTE — HISTORY OF PRESENT ILLNESS
[Disease: _____________________] : Disease: [unfilled] [T: ___] : T[unfilled] [N: ___] : N[unfilled] [M: ___] : M[unfilled] [AJCC Stage: ____] : AJCC Stage: [unfilled] [de-identified] : 54 M developed painless jaundice in 2017. An US performed in [facility] revealed a pancreatic mass. One year prior to this, he was diagnosed with DM. + 50 lb weight loss during this time. His father  of pancreatic cancer in his 70s. He traveled to [facility] and underwent an MRI Abd on 10/25/17 showed a 3.8 cm mass in the pancreatic head abutting the lateral wall of SMA and anterior wall of PV. PET/CT was negative for malignancy outside of the pancreas. Initial recommendation for was neoadjuvant chemotherapy. Patient sought a 2nd opinion from a surgeon and underwent an R1 Whipple with a rosalba-en-Y on 10/31/17. This was followed by 2 cycles of gemcitabine and capecitabine. Interim PET showed no disease, and he was then treated with 45 Gy radiation in 25 fractions to tumor bed and regional lymphatics with concurrent capecitabine. He followed this with 2 more cycles of gemcitabine and capecitabine. PET/CT after treatment in 2018 showed no recurrence of disease. [place] then came to the [place] and was admitted to [facility] 10/2018 with small bowel obstruction. He is status post laparotomy and reduction of an internal hernia and revision of a strictured jejunal-jejunal anastomosis on 10/17/18. Pathology was benign. He has been on surveillance without evidence of recurrent disease. \par \par 19: CT A/P: negative for metastasis  [de-identified] : well differentiated adenocarcinoma  [de-identified] : CA 19.9 <1  [FreeTextEntry1] : surveillance  [de-identified] : Just returned from 3 mos trip to [place] as well as other countries on business. He overall feels ok. c/o neck pain x 1 mos, discomfort worse at night, does not bother him during the day. Pain is paraspinal around C6, tender to palpation. has not tried heat or any analgesics. \par Eating ok. Weight is unchanged. Denies any abdominal pain. Having 2-3 BMs per day. \par DM is not well controlled. Has not seen endo since oct. Next available apt is March. asking for me to help him obtain an earlier apt.

## 2020-01-06 NOTE — REASON FOR VISIT
[Follow-Up Visit] : a follow-up [Spouse] : spouse [FreeTextEntry2] : Pancreatic cancer s/p resection now on surveillance

## 2020-01-07 LAB
ALBUMIN SERPL ELPH-MCNC: 4.1 G/DL
ALP BLD-CCNC: 99 U/L
ALT SERPL-CCNC: 33 U/L
ANION GAP SERPL CALC-SCNC: 14 MMOL/L
AST SERPL-CCNC: 29 U/L
BILIRUB SERPL-MCNC: 0.5 MG/DL
BUN SERPL-MCNC: 8 MG/DL
CALCIUM SERPL-MCNC: 9.3 MG/DL
CANCER AG19-9 SERPL-ACNC: <2 U/ML
CHLORIDE SERPL-SCNC: 104 MMOL/L
CO2 SERPL-SCNC: 22 MMOL/L
CREAT SERPL-MCNC: 0.84 MG/DL
ESTIMATED AVERAGE GLUCOSE: 220 MG/DL
GLUCOSE SERPL-MCNC: 262 MG/DL
HBA1C MFR BLD HPLC: 9.3 %
POTASSIUM SERPL-SCNC: 4.5 MMOL/L
PROT SERPL-MCNC: 6.7 G/DL
SODIUM SERPL-SCNC: 140 MMOL/L

## 2020-01-08 ENCOUNTER — FORM ENCOUNTER (OUTPATIENT)
Age: 55
End: 2020-01-08

## 2020-01-09 ENCOUNTER — APPOINTMENT (OUTPATIENT)
Dept: CT IMAGING | Facility: IMAGING CENTER | Age: 55
End: 2020-01-09
Payer: MEDICAID

## 2020-01-09 ENCOUNTER — OUTPATIENT (OUTPATIENT)
Dept: OUTPATIENT SERVICES | Facility: HOSPITAL | Age: 55
LOS: 1 days | End: 2020-01-09
Payer: MEDICAID

## 2020-01-09 DIAGNOSIS — Z00.8 ENCOUNTER FOR OTHER GENERAL EXAMINATION: ICD-10-CM

## 2020-01-09 PROCEDURE — 74177 CT ABD & PELVIS W/CONTRAST: CPT | Mod: 26

## 2020-01-09 PROCEDURE — 71260 CT THORAX DX C+: CPT | Mod: 26

## 2020-01-09 PROCEDURE — 71260 CT THORAX DX C+: CPT

## 2020-01-09 PROCEDURE — 74177 CT ABD & PELVIS W/CONTRAST: CPT

## 2020-01-10 ENCOUNTER — APPOINTMENT (OUTPATIENT)
Dept: INTERNAL MEDICINE | Facility: CLINIC | Age: 55
End: 2020-01-10

## 2020-01-15 ENCOUNTER — APPOINTMENT (OUTPATIENT)
Dept: HEMATOLOGY ONCOLOGY | Facility: CLINIC | Age: 55
End: 2020-01-15
Payer: MEDICAID

## 2020-01-15 VITALS
TEMPERATURE: 97.7 F | BODY MASS INDEX: 19.85 KG/M2 | OXYGEN SATURATION: 98 % | WEIGHT: 126.76 LBS | RESPIRATION RATE: 16 BRPM | DIASTOLIC BLOOD PRESSURE: 69 MMHG | SYSTOLIC BLOOD PRESSURE: 104 MMHG | HEART RATE: 66 BPM

## 2020-01-15 PROCEDURE — 99213 OFFICE O/P EST LOW 20 MIN: CPT

## 2020-02-08 NOTE — REASON FOR VISIT
[Follow-Up Visit] : a follow-up [Spouse] : spouse [FreeTextEntry2] : Pancreatic cancer on surveillance

## 2020-02-08 NOTE — HISTORY OF PRESENT ILLNESS
[Disease: _____________________] : Disease: [unfilled] [T: ___] : T[unfilled] [N: ___] : N[unfilled] [M: ___] : M[unfilled] [AJCC Stage: ____] : AJCC Stage: [unfilled] [de-identified] : 54 M developed painless jaundice in 2017. An US performed in [facility] revealed a pancreatic mass. One year prior to this, he was diagnosed with DM. + 50 lb weight loss during this time. His father  of pancreatic cancer in his 70s. He traveled to [facility] and underwent an MRI Abd on 10/25/17 showed a 3.8 cm mass in the pancreatic head abutting the lateral wall of SMA and anterior wall of PV. PET/CT was negative for malignancy outside of the pancreas. Initial recommendation for was neoadjuvant chemotherapy. Patient sought a 2nd opinion from a surgeon and underwent an R1 Whipple with a rosalba-en-Y on 10/31/17. This was followed by 2 cycles of gemcitabine and capecitabine. Interim PET showed no disease, and he was then treated with 45 Gy radiation in 25 fractions to tumor bed and regional lymphatics with concurrent capecitabine. He followed this with 2 more cycles of gemcitabine and capecitabine. PET/CT after treatment in 2018 showed no recurrence of disease. [place] then came to the [place] and was admitted to [facility] 10/2018 with small bowel obstruction. He is status post laparotomy and reduction of an internal hernia and revision of a strictured jejunal-jejunal anastomosis on 10/17/18. Pathology was benign. He has been on surveillance without evidence of recurrent disease. \par \par 19: CT A/P: negative for metastasis \par 20: CT CAP: neg for mets [de-identified] : well differentiated adenocarcinoma  [de-identified] : CA 19.9 <1  [FreeTextEntry1] : surveillance  [de-identified] : presents to review CT scan\par BS remain high, he is trying to change his diet.

## 2020-02-18 ENCOUNTER — OUTPATIENT (OUTPATIENT)
Dept: OUTPATIENT SERVICES | Facility: HOSPITAL | Age: 55
LOS: 1 days | End: 2020-02-18

## 2020-02-18 ENCOUNTER — NON-APPOINTMENT (OUTPATIENT)
Age: 55
End: 2020-02-18

## 2020-02-18 ENCOUNTER — LABORATORY RESULT (OUTPATIENT)
Age: 55
End: 2020-02-18

## 2020-02-18 ENCOUNTER — APPOINTMENT (OUTPATIENT)
Dept: ENDOCRINOLOGY | Facility: CLINIC | Age: 55
End: 2020-02-18
Payer: MEDICAID

## 2020-02-18 VITALS
WEIGHT: 129 LBS | BODY MASS INDEX: 20.25 KG/M2 | HEIGHT: 67 IN | SYSTOLIC BLOOD PRESSURE: 112 MMHG | HEART RATE: 78 BPM | OXYGEN SATURATION: 98 % | DIASTOLIC BLOOD PRESSURE: 64 MMHG

## 2020-02-18 DIAGNOSIS — E78.5 HYPERLIPIDEMIA, UNSPECIFIED: ICD-10-CM

## 2020-02-18 DIAGNOSIS — E11.9 TYPE 2 DIABETES MELLITUS WITHOUT COMPLICATIONS: ICD-10-CM

## 2020-02-18 DIAGNOSIS — K86.89 OTHER SPECIFIED DISEASES OF PANCREAS: ICD-10-CM

## 2020-02-18 DIAGNOSIS — E55.9 VITAMIN D DEFICIENCY, UNSPECIFIED: ICD-10-CM

## 2020-02-18 DIAGNOSIS — C25.9 MALIGNANT NEOPLASM OF PANCREAS, UNSPECIFIED: ICD-10-CM

## 2020-02-18 PROCEDURE — 99214 OFFICE O/P EST MOD 30 MIN: CPT | Mod: GC

## 2020-02-18 RX ORDER — INSULIN GLARGINE 100 [IU]/ML
100 INJECTION, SOLUTION SUBCUTANEOUS
Qty: 1 | Refills: 5 | Status: ACTIVE | COMMUNITY
Start: 2019-01-04 | End: 1900-01-01

## 2020-02-18 RX ORDER — ROSUVASTATIN CALCIUM 20 MG/1
20 TABLET, FILM COATED ORAL DAILY
Qty: 90 | Refills: 3 | Status: ACTIVE | COMMUNITY
Start: 2018-11-15 | End: 1900-01-01

## 2020-02-18 RX ORDER — METFORMIN HYDROCHLORIDE 1000 MG/1
1000 TABLET, EXTENDED RELEASE ORAL
Qty: 3 | Refills: 3 | Status: ACTIVE | COMMUNITY
Start: 2018-11-16 | End: 1900-01-01

## 2020-02-18 RX ORDER — INSULIN LISPRO 100 [IU]/ML
100 INJECTION, SOLUTION INTRAVENOUS; SUBCUTANEOUS
Qty: 1 | Refills: 5 | Status: ACTIVE | COMMUNITY
Start: 2019-01-04 | End: 1900-01-01

## 2020-02-19 LAB
CREAT UR-MCNC: 313 MG/DL — SIGNIFICANT CHANGE UP
MICROALBUMIN UR-MCNC: 2.5 MG/DL — SIGNIFICANT CHANGE UP
MICROALBUMIN/CREAT UR-RTO: 8 MG/G — SIGNIFICANT CHANGE UP (ref 0–30)

## 2020-02-21 LAB — GLUCOSE BLDC GLUCOMTR-MCNC: 239

## 2020-02-21 NOTE — REVIEW OF SYSTEMS
[Negative] : Heme/Lymph [All other systems negative] : All other systems negative [Fatigue] : no fatigue [Fever] : no fever [Chills] : no chills [Visual Field Defect] : no visual field defect [Blurry Vision] : no blurred vision [Eye Pain] : no eye pain [Redness] : no redness  [Dysphagia] : no dysphagia [Dysphonia] : no dysphonia [Neck Pain] : no neck pain [Oral Ulcers] : no oral ulcers [Palpitations] : no palpitations [Chest Pain] : no chest pain [Heart Rate Is Fast] : the heart rate was not fast [Heart Rate Is Slow] : the heart rate was not slow [Shortness Of Breath] : no shortness of breath [Leg Claudication] : no intermittent leg claudication [Wheezing] : no wheezing was heard [Cough] : no cough [SOB on Exertion] : no shortness of breath during exertion [Orthopnea] : no orthopnea [Nausea] : no nausea [PND] : no PND [Constipation] : no constipation [Vomiting] : no vomiting was observed [Abdominal Pain] : no abdominal pain [Dysuria] : no dysuria [Hesitancy] : no hesitancy [Joint Stiffness] : no joint stiffness [Incontinence] : no incontinence [Joint Pain] : no joint pain [Muscle Cramps] : no muscle cramps [Muscle Weakness] : no muscle weakness [Myalgia] : no myalgia  [Hirsutism] : no hirsutism [Acanthosis] : no acanthosis  [Back Pain] : no back pain [Acne] : no acne [Hair Loss] : no hair loss [Ulcer] : no ulcer [Dry Skin] : no dry skin [Headache] : no headaches [Pain/Numbness of Digits] : no pain/numbness of digits [Tremors] : no tremors [Polydipsia] : no polydipsia [Cold Intolerance] : cold tolerant [Heat Intolerance] : heat tolerant

## 2020-02-21 NOTE — END OF VISIT
[] : Fellow [FreeTextEntry3] : Uncomplicated Type 2 Diabetes/Partial Pancreatectomy with post breakfast hyperglycemia. Recommendation to increase AM insulin and decrease AM carbohydrate intake. Pedal edema resolved with discontinuation of Actos.

## 2020-02-21 NOTE — HISTORY OF PRESENT ILLNESS
[FreeTextEntry1] : 55 YO with man with PMHx of pancreatic adenocarcinoma s/p Whipple's procedure in 2017 c/b internal hernia s/p revision in Oct 2018, and Hx of diabetes.  \par \par Patient was taking  Actos 30 mg daily and metformin 500 mg daily. During his recent hospitalization in October for revision of his Whipple procedure, his BGL was controlled, his c-peptide was 0.9, and he was discharged on his home medication regimen. At last visit C peptide was noted to be 2.2 with Glucose 295. Actos was discontinued due to pedal edema in the past. \par \par On lantus 15 units qhs, and humalog 5-5-5 units TID and metformin 1000 mg BID. Briefly stopped metformin and lowered lantus/humalog from 2/4- 2/10 Actos was d/c again in 9/2019 as it was unclear if it was helping. \par Hgb a1c 9.3 1/2020\par Uses a freestyle azeem from [facility] as he has some remaining. \par As per log, post-prandial  after breakfast to >250. Variable BG with lunch and dinner. AM: 150-160\par 2 hours post breakfast: 240-280\par Hypoglycemia to 54 usually fasting, around 3 weeks ago. None in the past 2 weeks. \par \par Breakfast - 1 Roti, Eggs and vegetables. Potatoes. Tea with honey \par Lunch - Rice, fish and vegetables.  \par Dinner - Rice, fish and vegetables.  \par Snacks- No juice/soda. No candy or sweets. Occasional fruits \par  \par Exercise- Walk 30 mins 5 days a week. \par \par Saw optho Oct 2019: no retinopathy. No peripheral neuropathy. \par No other known macro or microvascular complications.

## 2020-02-21 NOTE — PHYSICAL EXAM
[Alert] : alert [No Acute Distress] : no acute distress [Well Developed] : well developed [Normal Sclera/Conjunctiva] : normal sclera/conjunctiva [No Neck Mass] : no neck mass was observed [No Oral Ulcers] : no oral ulcers [EOMI] : extra ocular movement intact [Supple] : the neck was supple [No LAD] : no lymphadenopathy [No Respiratory Distress] : no respiratory distress [Normal Rate and Effort] : normal respiratory rhythm and effort [No Accessory Muscle Use] : no accessory muscle use [Clear to Auscultation] : lungs were clear to auscultation bilaterally [Normal Rate] : heart rate was normal  [Normal S1, S2] : normal S1 and S2 [Regular Rhythm] : with a regular rhythm [No CVA Tenderness] : no ~M costovertebral angle tenderness [No Spinal Tenderness] : no spinal tenderness [No Joint Swelling] : no joint swelling seen [Normal Gait] : normal gait [No Clubbing, Cyanosis] : no clubbing  or cyanosis of the fingernails [Normal Strength/Tone] : muscle strength and tone were normal [No Involuntary Movements] : no involuntary movements were seen [No Rash] : no rash [No Skin Lesions] : no skin lesions [Left Foot Was Examined] : left foot ~C was examined [Right Foot Was Examined] : right foot ~C was examined [Normal] : normal [Cranial Nerves Intact] : cranial nerves 2-12 were intact [No Motor Deficits] : the motor exam was normal [No Tremors] : no tremors [No Sensory Deficits] : the sensory exam was normal to light touch and pinprick [Oriented x3] : oriented to person, place, and time [Normal Insight/Judgement] : insight and judgment were intact [Normal Affect] : the affect was normal [Normal Mood] : the mood was normal [Foot Ulcers] : no foot ulcers [Abdominal Striae] : no abdominal striae [de-identified] : Abd soft, non-tender, non-distended, with scars from prior surgeries

## 2020-02-21 NOTE — ASSESSMENT
[Carbohydrate Consistent Diet] : carbohydrate consistent diet [Diabetes Foot Care] : diabetes foot care [Hypoglycemia Management] : hypoglycemia management [Importance of Diet and Exercise] : importance of diet and exercise to improve glycemic control, achieve weight loss and improve cardiovascular health [Long Term Vascular Complications] : long term vascular complications of diabetes [Action and use of Insulin] : action and use of short and long-acting insulin [Retinopathy Screening] : Patient was referred to ophthalmology for retinopathy screening [Insulin Self-Administration] : insulin self-administration [Self Monitoring of Blood Glucose] : self monitoring of blood glucose [FreeTextEntry1] : 55 yo with hx of pancreatic adenocarcinoma s/p Whipple's and chemo/radiation with uncontrolled type 2 diabetes \par \par 1. Type 2 diabetes (still has residual islet cell function/ c-peptide c/w pancreatic remnant)\par A1C 9.3% 1/2020 \par Main problem is post breakfast hyperglycemia.\par -will c/w lantus 15 units qhs\par - increase humalog to 7-5-5. \par Can c/w metformin 1000mg BID\par -advised to decrease carb intake for breakfast and limit use of honey  \par Counselled on hypoglycemia management\par Pt educated about the healthy diet/carbohydrate limits, the importance of medication adherence, BGL monitoring, and daily foot checks.\par \par 2. HLD\par -Continue Rosuvastatin 20mg Daily\par -LDL 71 (Sept 2018)\par \par 3. HTN \par -bp at goal without meds \par \par 4. Vit D Deficiency\par -continue Vit D3 5000 IU Daily .  Will check vitamin D at next visit.\par \par Follow up in 1-2 mos\par \par D/W Dr. Ortiz\par

## 2020-05-27 ENCOUNTER — OUTPATIENT (OUTPATIENT)
Dept: OUTPATIENT SERVICES | Facility: HOSPITAL | Age: 55
LOS: 1 days | Discharge: HOME | End: 2020-05-27

## 2020-05-27 DIAGNOSIS — C25.9 MALIGNANT NEOPLASM OF PANCREAS, UNSPECIFIED: ICD-10-CM

## 2020-05-29 ENCOUNTER — APPOINTMENT (OUTPATIENT)
Age: 55
End: 2020-05-29
Payer: MEDICAID

## 2020-05-29 PROCEDURE — 99442: CPT

## 2020-05-29 NOTE — HISTORY OF PRESENT ILLNESS
[Home] : at home, [unfilled] , at the time of the visit. [Medical Office: ([facility])___] : at the medical office located in  [Verbal consent obtained from patient] : the patient, [unfilled] [Disease: _____________________] : Disease: [unfilled] [T: ___] : T[unfilled] [N: ___] : N[unfilled] [M: ___] : M[unfilled] [AJCC Stage: ____] : AJCC Stage: [unfilled] [de-identified] : 54 M developed painless jaundice in 2017. An US performed in [facility] revealed a pancreatic mass. One year prior to this, he was diagnosed with DM. + 50 lb weight loss during this time. His father  of pancreatic cancer in his 70s. He traveled to [facility] and underwent an MRI Abd on 10/25/17 showed a 3.8 cm mass in the pancreatic head abutting the lateral wall of SMA and anterior wall of PV. PET/CT was negative for malignancy outside of the pancreas. Initial recommendation for was neoadjuvant chemotherapy. Patient sought a 2nd opinion from a surgeon and underwent an R1 Whipple with a rosalba-en-Y on 10/31/17. This was followed by 2 cycles of gemcitabine and capecitabine. Interim PET showed no disease, and he was then treated with 45 Gy radiation in 25 fractions to tumor bed and regional lymphatics with concurrent capecitabine. He followed this with 2 more cycles of gemcitabine and capecitabine. PET/CT after treatment in 2018 showed no recurrence of disease. [place] then [place] the [place] and was admitted to [facility] 10/2018 with small bowel obstruction. He is status post laparotomy and reduction of an internal hernia and revision of a strictured jejunal-jejunal anastomosis on 10/17/18. Pathology was benign. He has been on surveillance without evidence of recurrent disease. \par \par 19: CT A/P: negative for metastasis \par 20: CT CAP: neg for mets [de-identified] : well differentiated adenocarcinoma  [FreeTextEntry1] : surveillance  [de-identified] : CA 19.9 <1  [de-identified] : Was fasting due to Ramadan last month, lost 1 kg. Otherwise feeling feel. Elevated BS due to eating high carbs. Denies any abdominal discomfort. Normal BMs.

## 2020-07-01 ENCOUNTER — OUTPATIENT (OUTPATIENT)
Dept: OUTPATIENT SERVICES | Facility: HOSPITAL | Age: 55
LOS: 1 days | Discharge: HOME | End: 2020-07-01

## 2020-07-01 DIAGNOSIS — C25.9 MALIGNANT NEOPLASM OF PANCREAS, UNSPECIFIED: ICD-10-CM

## 2020-07-02 ENCOUNTER — RESULT REVIEW (OUTPATIENT)
Age: 55
End: 2020-07-02

## 2020-07-02 ENCOUNTER — APPOINTMENT (OUTPATIENT)
Age: 55
End: 2020-07-02

## 2020-07-02 ENCOUNTER — OUTPATIENT (OUTPATIENT)
Dept: OUTPATIENT SERVICES | Facility: HOSPITAL | Age: 55
LOS: 1 days | End: 2020-07-02
Payer: MEDICAID

## 2020-07-02 ENCOUNTER — APPOINTMENT (OUTPATIENT)
Dept: CT IMAGING | Facility: IMAGING CENTER | Age: 55
End: 2020-07-02
Payer: MEDICAID

## 2020-07-02 DIAGNOSIS — C25.9 MALIGNANT NEOPLASM OF PANCREAS, UNSPECIFIED: ICD-10-CM

## 2020-07-02 LAB
ALBUMIN SERPL ELPH-MCNC: 4.4 G/DL
ALP BLD-CCNC: 76 U/L
ALT SERPL-CCNC: 24 U/L
ANION GAP SERPL CALC-SCNC: 13 MMOL/L
AST SERPL-CCNC: 26 U/L
BASOPHILS # BLD AUTO: 0.04 K/UL — SIGNIFICANT CHANGE UP (ref 0–0.2)
BASOPHILS NFR BLD AUTO: 0.9 % — SIGNIFICANT CHANGE UP (ref 0–2)
BILIRUB SERPL-MCNC: 0.7 MG/DL
BUN SERPL-MCNC: 10 MG/DL
CALCIUM SERPL-MCNC: 9.1 MG/DL
CANCER AG19-9 SERPL-ACNC: <2 U/ML
CHLORIDE SERPL-SCNC: 98 MMOL/L
CO2 SERPL-SCNC: 26 MMOL/L
CREAT SERPL-MCNC: 0.73 MG/DL
EOSINOPHIL # BLD AUTO: 0.11 K/UL — SIGNIFICANT CHANGE UP (ref 0–0.5)
EOSINOPHIL NFR BLD AUTO: 2.5 % — SIGNIFICANT CHANGE UP (ref 0–6)
GLUCOSE SERPL-MCNC: 181 MG/DL
HCT VFR BLD CALC: 40.6 % — SIGNIFICANT CHANGE UP (ref 39–50)
HGB BLD-MCNC: 12.9 G/DL — LOW (ref 13–17)
IMM GRANULOCYTES NFR BLD AUTO: 0.2 % — SIGNIFICANT CHANGE UP (ref 0–1.5)
LYMPHOCYTES # BLD AUTO: 1.51 K/UL — SIGNIFICANT CHANGE UP (ref 1–3.3)
LYMPHOCYTES # BLD AUTO: 34.5 % — SIGNIFICANT CHANGE UP (ref 13–44)
MCHC RBC-ENTMCNC: 27.4 PG — SIGNIFICANT CHANGE UP (ref 27–34)
MCHC RBC-ENTMCNC: 31.8 GM/DL — LOW (ref 32–36)
MCV RBC AUTO: 86.2 FL — SIGNIFICANT CHANGE UP (ref 80–100)
MONOCYTES # BLD AUTO: 0.45 K/UL — SIGNIFICANT CHANGE UP (ref 0–0.9)
MONOCYTES NFR BLD AUTO: 10.3 % — SIGNIFICANT CHANGE UP (ref 2–14)
NEUTROPHILS # BLD AUTO: 2.26 K/UL — SIGNIFICANT CHANGE UP (ref 1.8–7.4)
NEUTROPHILS NFR BLD AUTO: 51.6 % — SIGNIFICANT CHANGE UP (ref 43–77)
NRBC # BLD: 0 /100 WBCS — SIGNIFICANT CHANGE UP (ref 0–0)
PLATELET # BLD AUTO: 219 K/UL — SIGNIFICANT CHANGE UP (ref 150–400)
POTASSIUM SERPL-SCNC: 4.5 MMOL/L
PROT SERPL-MCNC: 6.6 G/DL
RBC # BLD: 4.71 M/UL — SIGNIFICANT CHANGE UP (ref 4.2–5.8)
RBC # FLD: 14.1 % — SIGNIFICANT CHANGE UP (ref 10.3–14.5)
SODIUM SERPL-SCNC: 136 MMOL/L
WBC # BLD: 4.38 K/UL — SIGNIFICANT CHANGE UP (ref 3.8–10.5)
WBC # FLD AUTO: 4.38 K/UL — SIGNIFICANT CHANGE UP (ref 3.8–10.5)

## 2020-07-02 PROCEDURE — 71260 CT THORAX DX C+: CPT | Mod: 26

## 2020-07-02 PROCEDURE — 74177 CT ABD & PELVIS W/CONTRAST: CPT

## 2020-07-02 PROCEDURE — 71260 CT THORAX DX C+: CPT

## 2020-07-02 PROCEDURE — 74177 CT ABD & PELVIS W/CONTRAST: CPT | Mod: 26

## 2020-07-02 PROCEDURE — 82565 ASSAY OF CREATININE: CPT

## 2020-07-09 ENCOUNTER — APPOINTMENT (OUTPATIENT)
Dept: OPHTHALMOLOGY | Facility: CLINIC | Age: 55
End: 2020-07-09
Payer: MEDICAID

## 2020-08-20 ENCOUNTER — NON-APPOINTMENT (OUTPATIENT)
Age: 55
End: 2020-08-20

## 2020-08-20 ENCOUNTER — APPOINTMENT (OUTPATIENT)
Dept: OPHTHALMOLOGY | Facility: CLINIC | Age: 55
End: 2020-08-20
Payer: MEDICAID

## 2020-08-20 PROCEDURE — 92133 CPTRZD OPH DX IMG PST SGM ON: CPT

## 2020-08-20 PROCEDURE — 92012 INTRM OPH EXAM EST PATIENT: CPT

## 2020-08-25 ENCOUNTER — APPOINTMENT (OUTPATIENT)
Dept: ENDOCRINOLOGY | Facility: CLINIC | Age: 55
End: 2020-08-25

## 2020-12-22 ENCOUNTER — APPOINTMENT (OUTPATIENT)
Dept: ORTHOPEDIC SURGERY | Facility: CLINIC | Age: 55
End: 2020-12-22
Payer: MEDICAID

## 2020-12-22 VITALS — BODY MASS INDEX: 2.35 KG/M2 | WEIGHT: 15 LBS | HEIGHT: 67 IN

## 2020-12-22 DIAGNOSIS — M25.511 PAIN IN RIGHT SHOULDER: ICD-10-CM

## 2020-12-22 PROCEDURE — 73030 X-RAY EXAM OF SHOULDER: CPT | Mod: RT

## 2020-12-22 PROCEDURE — 99072 ADDL SUPL MATRL&STAF TM PHE: CPT

## 2020-12-22 PROCEDURE — 99204 OFFICE O/P NEW MOD 45 MIN: CPT | Mod: 25

## 2020-12-22 PROCEDURE — 20610 DRAIN/INJ JOINT/BURSA W/O US: CPT | Mod: RT

## 2020-12-23 ENCOUNTER — OUTPATIENT (OUTPATIENT)
Dept: OUTPATIENT SERVICES | Facility: HOSPITAL | Age: 55
LOS: 1 days | Discharge: HOME | End: 2020-12-23

## 2020-12-23 DIAGNOSIS — C25.9 MALIGNANT NEOPLASM OF PANCREAS, UNSPECIFIED: ICD-10-CM

## 2020-12-26 NOTE — PROCEDURE
[de-identified] : Injection: right Shoulder Subacromial Space.\par Indication: Impingement.\par \par A discussion was had with the patient regarding this procedure and all questions were answered. All risks, benefits and alternatives were discussed. These included but were not limited to bleeding, infection, and allergic reaction.  A timeout was done to ensure correct side and pt agreed to the procedure.   Chlorhexidine was used to clean and sterilize and prep the area in the posterior aspect of the shoulder. A 22-gauge 1.5" needle was used to inject 4cc of 1% lidocaine without epinephrine and 1cc of 40mg/ml methylprednisolone into the subacromial space. A sterile bandage was then applied. The patient tolerated the procedure well and there were no complications\par

## 2020-12-26 NOTE — PHYSICAL EXAM
[de-identified] : Constitutional: Well-nourished, well-developed, No acute distress\par Respiratory:  Good respiratory effort, no SOB\par Lymphatic: No regional lymphadenopathy, no lymphedema\par Psychiatric: Pleasant and normal affect, alert and oriented x3\par Skin: Clean dry and intact B/L UE/LE\par Musculoskeletal: normal except where as noted in regional exam\par \par \par right Shoulder:\par APPEARANCE: no marked deformities, no swelling or malalignment\par POSITIVE TENDERNESS:supraspinatus, LH biceps\par NONTENDER: infraspinatus, teres minor,anterior and posterior capsule, AC joint\par ROM: +painful arc, no scapular winging or dyskinesia present\par RESISTIVE TESTING: pain with  5/5 resisted flex/ext, empty can/ER/IR, horizontal abd/add \par SPECIAL TESTS: neg Drop Arm, + Empty Can, + Gallego/Neers, neg Ordoñez's, neg Speeds, neg Apprehension, neg cross arm adduction, neg apley's scratch test\par Vasc: 2+ radial pulse\par Neuro: AIN, PIN, Ulnar nerve intact to motor, DTRs 2+/4 biceps, triceps, brachioradialis\par Sensation: Intact to light touch throughout\par B/L Elbows:  No asymmetry, malalignment, or swelling, Full ROM, 5/5 strength in flexion/ext, pronation/supination, Joints stable\par B/L Wrist and Hand:  No asymmetry, malalignment, or swelling, Full ROM, 5/5 strength in wrist and long finger flexion/ext, radial/ulnar deviation, Joints stable\par \par \par  [de-identified] : \par The following radiographs were ordered and read by me during this patient's visit. I reviewed each radiograph in detail with the patient and discussed the findings as highlighted below. \par \par 3 views of the right shoulder were obtained today that show no fracture, or dislocation. There are no degenerative changes seen. There is no malalignment. No obvious osseous abnormality. Otherwise unremarkable.\par

## 2020-12-26 NOTE — DISCUSSION/SUMMARY
[de-identified] : \par Discussed findings of today's exam and possible causes of patient's pain.  There rotator cuff has full strength and there is no joint instability.  No trauma to the area.  No radicular symptoms.   Educated patient on their most probable diagnosis of rotator cuff tendinopathy. Reviewed possible courses of treatment including PT, oral medication, and injections, and we collaboratively decided best course of treatment at this time will include:\par 1. referral to PT to rotator cuff strength and range of motion\par 2.subacromial cortisone injeciton provided today.   Reviewed possible courses of treatment, and we collaboratively decided best course of treatment at this time will include cortisone injection today (see procedure note).  Informed the patient that the numbing medicine in today's injection will last for about 4-6 hours. The steroid that was injected will start to work in 1 to 2 days, peak at 1-2 weeks, and may last up to 4-6 weeks. Discussed with the patient the expected course of this injury, and the variable response to cortisone injections. Sometimes it is relieved with a single injection, while others require repeat injections.  Instructed to monitor blood gluconse post injection.\par \par \par Follow up in 6 weeks. If not improved can consider MRI, injections, or further cervical spine evaluation.\par \par Tami Allison MD, EdM\par Sports Medicine PM&R\par \par \par \par \par CHRISTINE, Kassi Watkins ATC, assisted with the history and documentation for Dr. Allison on this date 12/22/2020\par

## 2020-12-26 NOTE — HISTORY OF PRESENT ILLNESS
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  Pain is primarily located at the posterior and lateral  shoulder.  It began in 6/202, without injury or trauma.  Pain is described as achy/dull in nature, 6/10 in intensity, worse with increased activity, better with rest.  \par Denies bowel/bladder changes, fevers, chills, saddle anesthesia.  Denies numbness, tingling, weakness of the upper extremities.    \par \par

## 2020-12-27 ENCOUNTER — OUTPATIENT (OUTPATIENT)
Dept: OUTPATIENT SERVICES | Facility: HOSPITAL | Age: 55
LOS: 1 days | End: 2020-12-27
Payer: MEDICAID

## 2020-12-27 ENCOUNTER — APPOINTMENT (OUTPATIENT)
Dept: CT IMAGING | Facility: IMAGING CENTER | Age: 55
End: 2020-12-27
Payer: MEDICAID

## 2020-12-27 DIAGNOSIS — C25.9 MALIGNANT NEOPLASM OF PANCREAS, UNSPECIFIED: ICD-10-CM

## 2020-12-27 PROCEDURE — 74177 CT ABD & PELVIS W/CONTRAST: CPT | Mod: 26

## 2020-12-27 PROCEDURE — 71260 CT THORAX DX C+: CPT | Mod: 26

## 2020-12-27 PROCEDURE — 71260 CT THORAX DX C+: CPT

## 2020-12-27 PROCEDURE — 74177 CT ABD & PELVIS W/CONTRAST: CPT

## 2020-12-27 PROCEDURE — 82565 ASSAY OF CREATININE: CPT

## 2020-12-30 ENCOUNTER — APPOINTMENT (OUTPATIENT)
Age: 55
End: 2020-12-30
Payer: MEDICAID

## 2020-12-30 ENCOUNTER — RESULT REVIEW (OUTPATIENT)
Age: 55
End: 2020-12-30

## 2020-12-30 VITALS
SYSTOLIC BLOOD PRESSURE: 120 MMHG | BODY MASS INDEX: 20.03 KG/M2 | OXYGEN SATURATION: 98 % | HEART RATE: 81 BPM | WEIGHT: 127.87 LBS | RESPIRATION RATE: 14 BRPM | TEMPERATURE: 98 F | DIASTOLIC BLOOD PRESSURE: 82 MMHG

## 2020-12-30 LAB
ALBUMIN SERPL ELPH-MCNC: 4.8 G/DL
ALP BLD-CCNC: 88 U/L
ALT SERPL-CCNC: 30 U/L
ANION GAP SERPL CALC-SCNC: 13 MMOL/L
AST SERPL-CCNC: 29 U/L
BASOPHILS # BLD AUTO: 0.03 K/UL — SIGNIFICANT CHANGE UP (ref 0–0.2)
BASOPHILS NFR BLD AUTO: 0.7 % — SIGNIFICANT CHANGE UP (ref 0–2)
BILIRUB SERPL-MCNC: 0.8 MG/DL
BUN SERPL-MCNC: 10 MG/DL
CALCIUM SERPL-MCNC: 9.4 MG/DL
CEA SERPL-MCNC: 4 NG/ML
CHLORIDE SERPL-SCNC: 102 MMOL/L
CO2 SERPL-SCNC: 25 MMOL/L
CREAT SERPL-MCNC: 1 MG/DL
EOSINOPHIL # BLD AUTO: 0.15 K/UL — SIGNIFICANT CHANGE UP (ref 0–0.5)
EOSINOPHIL NFR BLD AUTO: 3.5 % — SIGNIFICANT CHANGE UP (ref 0–6)
GLUCOSE SERPL-MCNC: 298 MG/DL
HCT VFR BLD CALC: 43 % — SIGNIFICANT CHANGE UP (ref 39–50)
HGB BLD-MCNC: 13.5 G/DL — SIGNIFICANT CHANGE UP (ref 13–17)
IMM GRANULOCYTES NFR BLD AUTO: 0.2 % — SIGNIFICANT CHANGE UP (ref 0–1.5)
LYMPHOCYTES # BLD AUTO: 1.25 K/UL — SIGNIFICANT CHANGE UP (ref 1–3.3)
LYMPHOCYTES # BLD AUTO: 29.5 % — SIGNIFICANT CHANGE UP (ref 13–44)
MCHC RBC-ENTMCNC: 26 PG — LOW (ref 27–34)
MCHC RBC-ENTMCNC: 31.4 G/DL — LOW (ref 32–36)
MCV RBC AUTO: 82.9 FL — SIGNIFICANT CHANGE UP (ref 80–100)
MONOCYTES # BLD AUTO: 0.4 K/UL — SIGNIFICANT CHANGE UP (ref 0–0.9)
MONOCYTES NFR BLD AUTO: 9.4 % — SIGNIFICANT CHANGE UP (ref 2–14)
NEUTROPHILS # BLD AUTO: 2.4 K/UL — SIGNIFICANT CHANGE UP (ref 1.8–7.4)
NEUTROPHILS NFR BLD AUTO: 56.7 % — SIGNIFICANT CHANGE UP (ref 43–77)
NRBC # BLD: 0 /100 WBCS — SIGNIFICANT CHANGE UP (ref 0–0)
PLATELET # BLD AUTO: 220 K/UL — SIGNIFICANT CHANGE UP (ref 150–400)
POTASSIUM SERPL-SCNC: 4.2 MMOL/L
PROT SERPL-MCNC: 7.4 G/DL
RBC # BLD: 5.19 M/UL — SIGNIFICANT CHANGE UP (ref 4.2–5.8)
RBC # FLD: 14.8 % — HIGH (ref 10.3–14.5)
SODIUM SERPL-SCNC: 140 MMOL/L
WBC # BLD: 4.24 K/UL — SIGNIFICANT CHANGE UP (ref 3.8–10.5)
WBC # FLD AUTO: 4.24 K/UL — SIGNIFICANT CHANGE UP (ref 3.8–10.5)

## 2020-12-30 PROCEDURE — 99072 ADDL SUPL MATRL&STAF TM PHE: CPT

## 2020-12-30 PROCEDURE — 99213 OFFICE O/P EST LOW 20 MIN: CPT

## 2021-01-18 NOTE — HISTORY OF PRESENT ILLNESS
[Disease: _____________________] : Disease: [unfilled] [T: ___] : T[unfilled] [N: ___] : N[unfilled] [M: ___] : M[unfilled] [AJCC Stage: ____] : AJCC Stage: [unfilled] [de-identified] : 54 M developed painless jaundice in 2017. An US performed in [facility] revealed a pancreatic mass. One year prior to this, he was diagnosed with DM. + 50 lb weight loss during this time. His father  of pancreatic cancer in his 70s. He traveled to [facility] and underwent an MRI Abd on 10/25/17 showed a 3.8 cm mass in the pancreatic head abutting the lateral wall of SMA and anterior wall of PV. PET/CT was negative for malignancy outside of the pancreas. Initial recommendation for was neoadjuvant chemotherapy. Patient sought a 2nd opinion from a surgeon and underwent an R1 Whipple with a rosalba-en-Y on 10/31/17. This was followed by 2 cycles of gemcitabine and capecitabine. Interim PET showed no disease, and he was then treated with 45 Gy radiation in 25 fractions to tumor bed and regional lymphatics with concurrent capecitabine. He followed this with 2 more cycles of gemcitabine and capecitabine. PET/CT after treatment in 2018 showed no recurrence of disease. [place] and was admitted to [facility] 10/2018 with small bowel obstruction. He is status post laparotomy and reduction of an internal hernia and revision of a strictured jejunal-jejunal anastomosis on 10/17/18. Pathology was benign. He has been on surveillance without evidence of recurrent disease. \par \par 19: CT A/P: negative for metastasis \par 20: CT CAP: neg for mets [de-identified] : well differentiated adenocarcinoma  [de-identified] : CA 19.9 <1  [de-identified] : Overall has been feeling well. Weight is stable. Good energy level. Good appetite. Scans were done on 12/27, no results yet. \par Labs from PCP reviewed from oct 2020, Tbili noted to 1.3. rest of LFTs normal.

## 2021-02-09 ENCOUNTER — APPOINTMENT (OUTPATIENT)
Dept: ORTHOPEDIC SURGERY | Facility: CLINIC | Age: 56
End: 2021-02-09

## 2021-02-23 ENCOUNTER — RESULT REVIEW (OUTPATIENT)
Age: 56
End: 2021-02-23

## 2021-02-23 ENCOUNTER — APPOINTMENT (OUTPATIENT)
Age: 56
End: 2021-02-23

## 2021-02-23 ENCOUNTER — OUTPATIENT (OUTPATIENT)
Dept: OUTPATIENT SERVICES | Facility: HOSPITAL | Age: 56
LOS: 1 days | Discharge: HOME | End: 2021-02-23

## 2021-02-23 DIAGNOSIS — C25.9 MALIGNANT NEOPLASM OF PANCREAS, UNSPECIFIED: ICD-10-CM

## 2021-02-23 LAB
BASOPHILS # BLD AUTO: 0.04 K/UL — SIGNIFICANT CHANGE UP (ref 0–0.2)
BASOPHILS NFR BLD AUTO: 0.7 % — SIGNIFICANT CHANGE UP (ref 0–2)
EOSINOPHIL # BLD AUTO: 0.18 K/UL — SIGNIFICANT CHANGE UP (ref 0–0.5)
EOSINOPHIL NFR BLD AUTO: 3.3 % — SIGNIFICANT CHANGE UP (ref 0–6)
HCT VFR BLD CALC: 42 % — SIGNIFICANT CHANGE UP (ref 39–50)
HGB BLD-MCNC: 13.9 G/DL — SIGNIFICANT CHANGE UP (ref 13–17)
IMM GRANULOCYTES NFR BLD AUTO: 0.6 % — SIGNIFICANT CHANGE UP (ref 0–1.5)
LYMPHOCYTES # BLD AUTO: 1.39 K/UL — SIGNIFICANT CHANGE UP (ref 1–3.3)
LYMPHOCYTES # BLD AUTO: 25.8 % — SIGNIFICANT CHANGE UP (ref 13–44)
MCHC RBC-ENTMCNC: 26.9 PG — LOW (ref 27–34)
MCHC RBC-ENTMCNC: 33.1 G/DL — SIGNIFICANT CHANGE UP (ref 32–36)
MCV RBC AUTO: 81.2 FL — SIGNIFICANT CHANGE UP (ref 80–100)
MONOCYTES # BLD AUTO: 0.46 K/UL — SIGNIFICANT CHANGE UP (ref 0–0.9)
MONOCYTES NFR BLD AUTO: 8.5 % — SIGNIFICANT CHANGE UP (ref 2–14)
NEUTROPHILS # BLD AUTO: 3.29 K/UL — SIGNIFICANT CHANGE UP (ref 1.8–7.4)
NEUTROPHILS NFR BLD AUTO: 61.1 % — SIGNIFICANT CHANGE UP (ref 43–77)
NRBC # BLD: 0 /100 WBCS — SIGNIFICANT CHANGE UP (ref 0–0)
PLATELET # BLD AUTO: 222 K/UL — SIGNIFICANT CHANGE UP (ref 150–400)
RBC # BLD: 5.17 M/UL — SIGNIFICANT CHANGE UP (ref 4.2–5.8)
RBC # FLD: 15.9 % — HIGH (ref 10.3–14.5)
WBC # BLD: 5.39 K/UL — SIGNIFICANT CHANGE UP (ref 3.8–10.5)
WBC # FLD AUTO: 5.39 K/UL — SIGNIFICANT CHANGE UP (ref 3.8–10.5)

## 2021-02-24 LAB
ANION GAP SERPL CALC-SCNC: 14 MMOL/L
BUN SERPL-MCNC: 19 MG/DL
CALCIUM SERPL-MCNC: 9.4 MG/DL
CEA SERPL-MCNC: 4.6 NG/ML
CHLORIDE SERPL-SCNC: 106 MMOL/L
CO2 SERPL-SCNC: 21 MMOL/L
CREAT SERPL-MCNC: 0.92 MG/DL
GLUCOSE SERPL-MCNC: 117 MG/DL
POTASSIUM SERPL-SCNC: 4.3 MMOL/L
SODIUM SERPL-SCNC: 142 MMOL/L

## 2021-02-26 ENCOUNTER — APPOINTMENT (OUTPATIENT)
Dept: HEMATOLOGY ONCOLOGY | Facility: CLINIC | Age: 56
End: 2021-02-26
Payer: MEDICAID

## 2021-02-26 ENCOUNTER — TRANSCRIPTION ENCOUNTER (OUTPATIENT)
Age: 56
End: 2021-02-26

## 2021-02-26 ENCOUNTER — NON-APPOINTMENT (OUTPATIENT)
Age: 56
End: 2021-02-26

## 2021-02-26 ENCOUNTER — APPOINTMENT (OUTPATIENT)
Age: 56
End: 2021-02-26

## 2021-02-26 VITALS
DIASTOLIC BLOOD PRESSURE: 70 MMHG | TEMPERATURE: 98 F | HEART RATE: 69 BPM | OXYGEN SATURATION: 96 % | WEIGHT: 130.07 LBS | SYSTOLIC BLOOD PRESSURE: 107 MMHG | BODY MASS INDEX: 20.37 KG/M2 | RESPIRATION RATE: 14 BRPM

## 2021-02-26 PROCEDURE — 99213 OFFICE O/P EST LOW 20 MIN: CPT

## 2021-03-03 ENCOUNTER — APPOINTMENT (OUTPATIENT)
Age: 56
End: 2021-03-03
Payer: MEDICAID

## 2021-03-03 VITALS
OXYGEN SATURATION: 99 % | SYSTOLIC BLOOD PRESSURE: 110 MMHG | HEART RATE: 66 BPM | DIASTOLIC BLOOD PRESSURE: 76 MMHG | TEMPERATURE: 98.2 F | RESPIRATION RATE: 14 BRPM | WEIGHT: 127.87 LBS | BODY MASS INDEX: 20.03 KG/M2

## 2021-03-03 DIAGNOSIS — R13.10 DYSPHAGIA, UNSPECIFIED: ICD-10-CM

## 2021-03-03 PROCEDURE — 99214 OFFICE O/P EST MOD 30 MIN: CPT

## 2021-03-03 PROCEDURE — 99072 ADDL SUPL MATRL&STAF TM PHE: CPT

## 2021-03-04 ENCOUNTER — APPOINTMENT (OUTPATIENT)
Dept: OPHTHALMOLOGY | Facility: CLINIC | Age: 56
End: 2021-03-04

## 2021-03-09 ENCOUNTER — APPOINTMENT (OUTPATIENT)
Dept: OPHTHALMOLOGY | Facility: CLINIC | Age: 56
End: 2021-03-09
Payer: MEDICAID

## 2021-03-09 ENCOUNTER — NON-APPOINTMENT (OUTPATIENT)
Age: 56
End: 2021-03-09

## 2021-03-09 PROCEDURE — 99072 ADDL SUPL MATRL&STAF TM PHE: CPT

## 2021-03-09 PROCEDURE — 92014 COMPRE OPH EXAM EST PT 1/>: CPT

## 2021-03-09 PROCEDURE — 92250 FUNDUS PHOTOGRAPHY W/I&R: CPT

## 2021-03-28 PROBLEM — R13.10 DYSPHAGIA: Status: ACTIVE | Noted: 2021-03-03

## 2021-03-28 NOTE — HISTORY OF PRESENT ILLNESS
[Disease: _____________________] : Disease: [unfilled] [T: ___] : T[unfilled] [N: ___] : N[unfilled] [M: ___] : M[unfilled] [AJCC Stage: ____] : AJCC Stage: [unfilled] [de-identified] : 54 M developed painless jaundice in 2017. An US performed in [facility] revealed a pancreatic mass. One year prior to this, he was diagnosed with DM. + 50 lb weight loss during this time. His father  of pancreatic cancer in his 70s. He traveled to [facility] [place] and underwent an MRI Abd on 10/25/17 showed a 3.8 cm mass in the pancreatic head abutting the lateral wall of SMA and anterior wall of PV. PET/CT was negative for malignancy outside of the pancreas. Initial recommendation for was neoadjuvant chemotherapy. Patient sought a 2nd opinion from a surgeon and underwent an R1 Whipple with a rosalba-en-Y on 10/31/17. This was followed by 2 cycles of gemcitabine and capecitabine. Interim PET showed no disease, and he was then treated with 45 Gy radiation in 25 fractions to tumor bed and regional lymphatics with concurrent capecitabine. He followed this with 2 more cycles of gemcitabine and capecitabine. PET/CT after treatment in 2018 showed no recurrence of disease. [place] the [place] and was admitted to [facility] 10/2018 with small bowel obstruction. He is status post laparotomy and reduction of an internal hernia and revision of a strictured jejunal-jejunal anastomosis on 10/17/18. Pathology was benign. He has been on surveillance without evidence of recurrent disease. \par \par 19: CT A/P: negative for metastasis \par 20: CT CAP: neg for mets\par 20: CT CAP: GENO [de-identified] : well differentiated adenocarcinoma  [de-identified] : CA 19.9 <1  [FreeTextEntry1] : surveillance  [de-identified] : overall feels well. Had injection of R shoulder and receiving PT. \par has not had a colonoscopy since 2017. \par c/o difficulty swallowing certain foods sometimes. \par has questions regarding nutrition.

## 2021-06-01 ENCOUNTER — OUTPATIENT (OUTPATIENT)
Dept: OUTPATIENT SERVICES | Facility: HOSPITAL | Age: 56
LOS: 1 days | Discharge: HOME | End: 2021-06-01

## 2021-06-01 DIAGNOSIS — C25.9 MALIGNANT NEOPLASM OF PANCREAS, UNSPECIFIED: ICD-10-CM

## 2021-06-06 NOTE — ASSESSMENT
[FreeTextEntry1] : Discussed with patient that repeat CEA is stable (have D/W results with Dr. Tompkins)\par Will make FU appt to discuss directly with her \par To obtain COVID vaccine No known allergies \par FU with endocrine for management of DM\par Suggested FU with nutritionist and MD in endocrinology to discuss weight gain  \par

## 2021-06-06 NOTE — HISTORY OF PRESENT ILLNESS
[de-identified] : 54 M developed painless jaundice in 2017. An US performed in [facility] revealed a pancreatic mass. One year prior to this, he was diagnosed with DM. + 50 lb weight loss during this time. His father  of pancreatic cancer in his 70s. He traveled to [facility] and underwent an MRI Abd on 10/25/17 showed a 3.8 cm mass in the pancreatic head abutting the lateral wall of SMA and anterior wall of PV. PET/CT was negative for malignancy outside of the pancreas. Initial recommendation for was neoadjuvant chemotherapy. Patient sought a 2nd opinion from a surgeon and underwent an R1 Whipple with a rosalba-en-Y on 10/31/17. This was followed by 2 cycles of gemcitabine and capecitabine. Interim PET showed no disease, and he was then treated with 45 Gy radiation in 25 fractions to tumor bed and regional lymphatics with concurrent capecitabine. He followed this with 2 more cycles of gemcitabine and capecitabine. PET/CT after treatment in 2018 showed no recurrence of disease. [place] the [place] and was admitted to [facility] 10/2018 with small bowel obstruction. He is status post laparotomy and reduction of an internal hernia and revision of a strictured jejunal-jejunal anastomosis on 10/17/18. Pathology was benign. He has been on surveillance without evidence of recurrent disease. \par \par 19: CT A/P: negative for metastasis \par 20: CT CAP: neg for mets [de-identified] : well differentiated adenocarcinoma  [de-identified] : CA 19.9 <1  [de-identified] : He wanted an appt to be examined prior to receiving vaccine for COVID. \par \par CEA 4 in December, requested repeat level done last week, 4.3, stable. \par \par Very anxious about not seeing Dr. Tompkins today. \par \par feels well\par sometimes food gets stuck when he eats too fast \par no diarrhea no pain no weight loss\par \par right shoulder pain  better with PT\par  wants to gain weight \par on insulin off metformin\par \par \par He would like us to have records from his endocrinologist, Dr Zahida thacker

## 2021-06-07 ENCOUNTER — APPOINTMENT (OUTPATIENT)
Dept: HEMATOLOGY ONCOLOGY | Facility: CLINIC | Age: 56
End: 2021-06-07

## 2021-06-07 ENCOUNTER — RESULT REVIEW (OUTPATIENT)
Age: 56
End: 2021-06-07

## 2021-06-07 ENCOUNTER — LABORATORY RESULT (OUTPATIENT)
Age: 56
End: 2021-06-07

## 2021-06-07 LAB
ALBUMIN SERPL ELPH-MCNC: 4.6 G/DL
ALP BLD-CCNC: 94 U/L
ALT SERPL-CCNC: 22 U/L
ANION GAP SERPL CALC-SCNC: 9 MMOL/L
AST SERPL-CCNC: 20 U/L
BASOPHILS # BLD AUTO: 0.04 K/UL — SIGNIFICANT CHANGE UP (ref 0–0.2)
BASOPHILS NFR BLD AUTO: 0.7 % — SIGNIFICANT CHANGE UP (ref 0–2)
BILIRUB SERPL-MCNC: 0.7 MG/DL
BUN SERPL-MCNC: 14 MG/DL
CALCIUM SERPL-MCNC: 9.5 MG/DL
CEA SERPL-MCNC: 3.8 NG/ML
CHLORIDE SERPL-SCNC: 105 MMOL/L
CO2 SERPL-SCNC: 28 MMOL/L
CREAT SERPL-MCNC: 0.86 MG/DL
EOSINOPHIL # BLD AUTO: 0.12 K/UL — SIGNIFICANT CHANGE UP (ref 0–0.5)
EOSINOPHIL NFR BLD AUTO: 2.2 % — SIGNIFICANT CHANGE UP (ref 0–6)
ESTIMATED AVERAGE GLUCOSE: 220 MG/DL
GLUCOSE SERPL-MCNC: 179 MG/DL
HBA1C MFR BLD HPLC: 9.3 %
HCT VFR BLD CALC: 41.7 % — SIGNIFICANT CHANGE UP (ref 39–50)
HGB BLD-MCNC: 13.7 G/DL — SIGNIFICANT CHANGE UP (ref 13–17)
IMM GRANULOCYTES NFR BLD AUTO: 0.2 % — SIGNIFICANT CHANGE UP (ref 0–1.5)
LYMPHOCYTES # BLD AUTO: 1.58 K/UL — SIGNIFICANT CHANGE UP (ref 1–3.3)
LYMPHOCYTES # BLD AUTO: 29.5 % — SIGNIFICANT CHANGE UP (ref 13–44)
MCHC RBC-ENTMCNC: 27.8 PG — SIGNIFICANT CHANGE UP (ref 27–34)
MCHC RBC-ENTMCNC: 32.9 G/DL — SIGNIFICANT CHANGE UP (ref 32–36)
MCV RBC AUTO: 84.8 FL — SIGNIFICANT CHANGE UP (ref 80–100)
MONOCYTES # BLD AUTO: 0.5 K/UL — SIGNIFICANT CHANGE UP (ref 0–0.9)
MONOCYTES NFR BLD AUTO: 9.3 % — SIGNIFICANT CHANGE UP (ref 2–14)
NEUTROPHILS # BLD AUTO: 3.1 K/UL — SIGNIFICANT CHANGE UP (ref 1.8–7.4)
NEUTROPHILS NFR BLD AUTO: 58.1 % — SIGNIFICANT CHANGE UP (ref 43–77)
NRBC # BLD: 0 /100 WBCS — SIGNIFICANT CHANGE UP (ref 0–0)
PLATELET # BLD AUTO: 176 K/UL — SIGNIFICANT CHANGE UP (ref 150–400)
POTASSIUM SERPL-SCNC: 4 MMOL/L
PROT SERPL-MCNC: 7.1 G/DL
RBC # BLD: 4.92 M/UL — SIGNIFICANT CHANGE UP (ref 4.2–5.8)
RBC # FLD: 13.3 % — SIGNIFICANT CHANGE UP (ref 10.3–14.5)
SODIUM SERPL-SCNC: 142 MMOL/L
WBC # BLD: 5.35 K/UL — SIGNIFICANT CHANGE UP (ref 3.8–10.5)
WBC # FLD AUTO: 5.35 K/UL — SIGNIFICANT CHANGE UP (ref 3.8–10.5)

## 2021-07-11 ENCOUNTER — OUTPATIENT (OUTPATIENT)
Dept: OUTPATIENT SERVICES | Facility: HOSPITAL | Age: 56
LOS: 1 days | Discharge: HOME | End: 2021-07-11

## 2021-07-11 DIAGNOSIS — C25.9 MALIGNANT NEOPLASM OF PANCREAS, UNSPECIFIED: ICD-10-CM

## 2021-07-14 ENCOUNTER — APPOINTMENT (OUTPATIENT)
Dept: HEMATOLOGY ONCOLOGY | Facility: CLINIC | Age: 56
End: 2021-07-14
Payer: MEDICAID

## 2021-07-14 VITALS
SYSTOLIC BLOOD PRESSURE: 95 MMHG | HEART RATE: 66 BPM | BODY MASS INDEX: 19.81 KG/M2 | HEIGHT: 67.72 IN | RESPIRATION RATE: 16 BRPM | WEIGHT: 129.19 LBS | DIASTOLIC BLOOD PRESSURE: 60 MMHG | OXYGEN SATURATION: 98 % | TEMPERATURE: 97.2 F

## 2021-07-14 DIAGNOSIS — Z00.00 ENCOUNTER FOR GENERAL ADULT MEDICAL EXAMINATION W/OUT ABNORMAL FINDINGS: ICD-10-CM

## 2021-07-14 PROCEDURE — 99213 OFFICE O/P EST LOW 20 MIN: CPT

## 2021-07-14 RX ORDER — BLOOD-GLUCOSE METER
W/DEVICE KIT MISCELLANEOUS
Qty: 1 | Refills: 0 | Status: COMPLETED | COMMUNITY
Start: 2019-01-04 | End: 2021-07-14

## 2021-07-14 RX ORDER — BENZONATATE 200 MG/1
200 CAPSULE ORAL 3 TIMES DAILY
Qty: 21 | Refills: 0 | Status: COMPLETED | COMMUNITY
Start: 2018-12-24 | End: 2021-07-14

## 2021-07-14 RX ORDER — BLOOD-GLUCOSE METER
KIT MISCELLANEOUS
Qty: 1 | Refills: 0 | Status: COMPLETED | COMMUNITY
Start: 2019-01-04 | End: 2021-07-14

## 2021-07-14 RX ORDER — PEN NEEDLE, DIABETIC 29 G X1/2"
32G X 4 MM NEEDLE, DISPOSABLE MISCELLANEOUS
Qty: 1 | Refills: 4 | Status: COMPLETED | COMMUNITY
Start: 2019-01-04 | End: 2021-07-14

## 2021-07-14 RX ORDER — LANCETS 33 GAUGE
EACH MISCELLANEOUS
Qty: 1 | Refills: 1 | Status: COMPLETED | COMMUNITY
Start: 2019-01-04 | End: 2021-07-14

## 2021-07-15 ENCOUNTER — EMERGENCY (EMERGENCY)
Facility: HOSPITAL | Age: 56
LOS: 1 days | Discharge: HOME | End: 2021-07-15
Admitting: EMERGENCY MEDICINE
Payer: MEDICAID

## 2021-07-15 VITALS
HEIGHT: 66 IN | OXYGEN SATURATION: 100 % | SYSTOLIC BLOOD PRESSURE: 119 MMHG | HEART RATE: 69 BPM | DIASTOLIC BLOOD PRESSURE: 72 MMHG | RESPIRATION RATE: 15 BRPM | TEMPERATURE: 98 F

## 2021-07-15 PROCEDURE — 12001 RPR S/N/AX/GEN/TRNK 2.5CM/<: CPT

## 2021-07-15 PROCEDURE — 99283 EMERGENCY DEPT VISIT LOW MDM: CPT | Mod: 25

## 2021-07-15 RX ORDER — TETANUS TOXOID, REDUCED DIPHTHERIA TOXOID AND ACELLULAR PERTUSSIS VACCINE, ADSORBED 5; 2.5; 8; 8; 2.5 [IU]/.5ML; [IU]/.5ML; UG/.5ML; UG/.5ML; UG/.5ML
0.5 SUSPENSION INTRAMUSCULAR ONCE
Refills: 0 | Status: COMPLETED | OUTPATIENT
Start: 2021-07-15 | End: 2021-07-15

## 2021-07-15 RX ORDER — ACETAMINOPHEN 500 MG
650 TABLET ORAL ONCE
Refills: 0 | Status: COMPLETED | OUTPATIENT
Start: 2021-07-15 | End: 2021-07-15

## 2021-07-15 RX ADMIN — Medication 650 MILLIGRAM(S): at 02:41

## 2021-07-15 RX ADMIN — TETANUS TOXOID, REDUCED DIPHTHERIA TOXOID AND ACELLULAR PERTUSSIS VACCINE, ADSORBED 0.5 MILLILITER(S): 5; 2.5; 8; 8; 2.5 SUSPENSION INTRAMUSCULAR at 02:40

## 2021-07-15 NOTE — ED PROVIDER NOTE - PATIENT PORTAL LINK FT
You can access the FollowMyHealth Patient Portal offered by [facility] by registering at the following website: [email or web address]. By joining [employer]s FollowMyHealth portal, you will also be able to view your health information using other applications (apps) compatible with our system.

## 2021-07-15 NOTE — ED ADULT TRIAGE NOTE - CHIEF COMPLAINT QUOTE
Pt c/o lac to R index finger.  Small lac to finger.  Denies any use of blood thinners.  PMHx:  pancreatic cancer

## 2021-07-15 NOTE — ED PROVIDER NOTE - PHYSICAL EXAMINATION
Vital signs reviewed.   CONSTITUTIONAL: Well-appearing; well-nourished; in no apparent distress. Non-toxic appearing.   HEAD: Normocephalic, atraumatic.  EYES: Normal conjunctiva and no sclera injection noted  ENT: normal nose; no rhinorrhea  CARD: Normal S1, S2  RESP: Normal chest excursion with respiration; breath sounds clear and equal bilaterally  EXT/MS: 1 CM laceration to distal index finger. FAROM. Non-tender  SKIN: Normal for age and race; warm; dry; good turgor; no apparent lesions or exudate noted.  NEURO: Awake, alert, oriented x 3,  PSYCH: Normal mood; appropriate affect.

## 2021-07-15 NOTE — ED PROVIDER NOTE - OBJECTIVE STATEMENT
57 Y/O M w/ no PMH presents to ER for laceration of RT index finger. Prior to arrival was cutting cucumber and accidentally cut finger. Not on anticoagulant or aspirin. No numbness/tingling

## 2021-07-15 NOTE — ED PROVIDER NOTE - NSFOLLOWUPINSTRUCTIONS_ED_ALL_ED_FT

## 2021-07-15 NOTE — ED PROVIDER NOTE - NS ED ROS FT
Constitutional: (-) fever   Head: Normal cephalic, Atraumatic  Cardiovascular: (-) chest pain, (-) wheezing  Respiratory: (-) cough, (-) shortness of breath  : (-) dysuria   Musculoskeletal: (-) back pain  Integumentary: lac RT index finger  Neurological: (-)loc  Allergic/Immunologic: (-) pruritus

## 2021-07-19 ENCOUNTER — OUTPATIENT (OUTPATIENT)
Dept: OUTPATIENT SERVICES | Facility: HOSPITAL | Age: 56
LOS: 1 days | End: 2021-07-19
Payer: MEDICAID

## 2021-07-19 ENCOUNTER — APPOINTMENT (OUTPATIENT)
Dept: CT IMAGING | Facility: IMAGING CENTER | Age: 56
End: 2021-07-19
Payer: MEDICAID

## 2021-07-19 DIAGNOSIS — C25.9 MALIGNANT NEOPLASM OF PANCREAS, UNSPECIFIED: ICD-10-CM

## 2021-07-19 PROCEDURE — 74177 CT ABD & PELVIS W/CONTRAST: CPT | Mod: 26

## 2021-07-19 PROCEDURE — 74177 CT ABD & PELVIS W/CONTRAST: CPT

## 2021-07-19 PROCEDURE — 71260 CT THORAX DX C+: CPT

## 2021-07-19 PROCEDURE — 71260 CT THORAX DX C+: CPT | Mod: 26

## 2021-07-19 PROCEDURE — 82565 ASSAY OF CREATININE: CPT

## 2021-07-22 ENCOUNTER — EMERGENCY (EMERGENCY)
Facility: HOSPITAL | Age: 56
LOS: 1 days | Discharge: HOME | End: 2021-07-22
Admitting: EMERGENCY MEDICINE
Payer: MEDICAID

## 2021-07-22 VITALS
OXYGEN SATURATION: 100 % | HEART RATE: 79 BPM | HEIGHT: 66 IN | DIASTOLIC BLOOD PRESSURE: 62 MMHG | TEMPERATURE: 98 F | RESPIRATION RATE: 17 BRPM | SYSTOLIC BLOOD PRESSURE: 108 MMHG

## 2021-07-22 PROCEDURE — L9995: CPT

## 2021-07-22 NOTE — ED PROVIDER NOTE - PROGRESS NOTE DETAILS
sutures removed - see procedure note.   d/c instructions, f/u recs, and return precautions reviewed with pt with stated understanding. All questions answered Supervising Statement (NATHALIA Levin): I have personally seen and examined this patient.  I agree with the above plan of care.

## 2021-07-22 NOTE — ED PROVIDER NOTE - CLINICAL SUMMARY MEDICAL DECISION MAKING FREE TEXT BOX
56 year old male with no PMH presents to ED for suture removal. On exam, L index finger with 2 blue sutures to 8mm lac - well approximated, no erythema, warmth nor edema noted to surrounding skin. Plan to remove sutures

## 2021-07-22 NOTE — ED PROVIDER NOTE - SKIN COLOR
L index finger with 2 blue sutures to 8mm lac - well approximated, no erythema, warmth nor edema noted to surrounding skin

## 2021-07-22 NOTE — ED PROVIDER NOTE - NSFOLLOWUPINSTRUCTIONS_ED_ALL_ED_FT
Advance activity as tolerated.      Continue all previously prescribed medications as directed unless otherwise instructed.      Follow up with your primary care physician in 48-72 hours.      Return to the ER for worsening or persistent symptoms including fever, chills, redness to incision, pus, chest pain, shortness of breath, and/or ANY NEW OR CONCERNING SYMPTOMS.     If you have issues obtaining follow up, please call: [phone] to obtain a doctor or specialist who takes your insurance in your area.  You may call [phone] to make an appointment with the internal medicine clinic.

## 2021-07-22 NOTE — ED PROVIDER NOTE - PATIENT PORTAL LINK FT
You can access the FollowMyHealth Patient Portal offered by [facility] by registering at the following website: [email or web address]. By joining [email or web address]’s FollowMyHealth portal, you will also be able to view your health information using other applications (apps) compatible with our system.

## 2021-07-28 ENCOUNTER — APPOINTMENT (OUTPATIENT)
Dept: GASTROENTEROLOGY | Facility: CLINIC | Age: 56
End: 2021-07-28
Payer: MEDICAID

## 2021-07-28 VITALS
WEIGHT: 130 LBS | OXYGEN SATURATION: 99 % | SYSTOLIC BLOOD PRESSURE: 104 MMHG | TEMPERATURE: 98 F | BODY MASS INDEX: 20.4 KG/M2 | HEART RATE: 68 BPM | HEIGHT: 67 IN | DIASTOLIC BLOOD PRESSURE: 73 MMHG

## 2021-07-28 DIAGNOSIS — Z12.11 ENCOUNTER FOR SCREENING FOR MALIGNANT NEOPLASM OF COLON: ICD-10-CM

## 2021-07-28 DIAGNOSIS — R10.11 RIGHT UPPER QUADRANT PAIN: ICD-10-CM

## 2021-07-28 DIAGNOSIS — Z85.9 PERSONAL HISTORY OF MALIGNANT NEOPLASM, UNSPECIFIED: ICD-10-CM

## 2021-07-28 PROCEDURE — 99204 OFFICE O/P NEW MOD 45 MIN: CPT

## 2021-07-28 RX ORDER — SODIUM SULFATE, MAGNESIUM SULFATE, AND POTASSIUM CHLORIDE 17.75; 2.7; 2.25 G/1; G/1; G/1
1479-225-188 TABLET ORAL
Qty: 1 | Refills: 0 | Status: ACTIVE | COMMUNITY
Start: 2021-07-28 | End: 1900-01-01

## 2021-07-28 RX ORDER — SODIUM PICOSULFATE, MAGNESIUM OXIDE, AND ANHYDROUS CITRIC ACID 10; 3.5; 12 MG/160ML; G/160ML; G/160ML
10-3.5-12 MG-GM LIQUID ORAL
Qty: 1 | Refills: 0 | Status: ACTIVE | COMMUNITY
Start: 2021-07-28 | End: 1900-01-01

## 2021-07-28 NOTE — REVIEW OF SYSTEMS
[Recent Weight Loss (___ Lbs)] : recent [unfilled] ~Ulb weight loss [As Noted in HPI] : as noted in HPI [Negative] : Heme/Lymph [de-identified] : s/p Deandre's

## 2021-07-28 NOTE — PHYSICAL EXAM
[General Appearance - Alert] : alert [General Appearance - In No Acute Distress] : in no acute distress [Sclera] : the sclera and conjunctiva were normal [PERRL With Normal Accommodation] : pupils were equal in size, round, and reactive to light [Extraocular Movements] : extraocular movements were intact [Outer Ear] : the ears and nose were normal in appearance [Oropharynx] : the oropharynx was normal [Neck Appearance] : the appearance of the neck was normal [Neck Cervical Mass (___cm)] : no neck mass was observed [Jugular Venous Distention Increased] : there was no jugular-venous distention [Thyroid Diffuse Enlargement] : the thyroid was not enlarged [Thyroid Nodule] : there were no palpable thyroid nodules [Auscultation Breath Sounds / Voice Sounds] : lungs were clear to auscultation bilaterally [Heart Rate And Rhythm] : heart rate was normal and rhythm regular [Heart Sounds] : normal S1 and S2 [Heart Sounds Gallop] : no gallops [Murmurs] : no murmurs [Heart Sounds Pericardial Friction Rub] : no pericardial rub [Bowel Sounds] : normal bowel sounds [Abdomen Soft] : soft [Abdomen Tenderness] : non-tender [] : no hepato-splenomegaly [Abdomen Mass (___ Cm)] : no abdominal mass palpated [No CVA Tenderness] : no ~M costovertebral angle tenderness [No Spinal Tenderness] : no spinal tenderness [Abnormal Walk] : normal gait [Nail Clubbing] : no clubbing  or cyanosis of the fingernails [Musculoskeletal - Swelling] : no joint swelling seen [Oriented To Time, Place, And Person] : oriented to person, place, and time [Motor Tone] : muscle strength and tone were normal [Impaired Insight] : insight and judgment were intact [Affect] : the affect was normal

## 2021-07-28 NOTE — REASON FOR VISIT
[Initial Evaluation] : an initial evaluation [FreeTextEntry1] : Hx of pancretic cancer s/p Whipple's, Screening colonoscopy, Post prandial RUQ ndiscofort

## 2021-07-28 NOTE — ASSESSMENT
[FreeTextEntry1] : Patient with history of pancreatic cancer status post Whipple's procedure and status post chemotherapy and radiation therapy.  He had a bout of small bowel obstruction that was treated with surgery.  No recurrent disease was found.  Recent PET scan also showed no recurrent disease.\par He will be scheduled for a screening colonoscopy.  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  He initially lost significant weight after the surgery but his weight is now stable.  He occasionally sees some oil in his stool.\par He does complain of some right upper quadrant postprandial discomfort but no GERD type symptoms.  He will be scheduled for an upper endoscopy.

## 2021-07-28 NOTE — HISTORY OF PRESENT ILLNESS
[FreeTextEntry1] : Patient is a 56-year-old gentleman with history of pancreatic cancer treated with a Whipple's procedure in 2017.  He has done remarkably well except for a bout of small bowel obstruction that was treated with exploratory surgery and reduction of an internal hernia and revision of a stricture at jejunal jejunal anastomosis.  Pathology was benign.  Patient was treated with chemotherapy and radiation therapy for his pancreatic cancer.  Recent PET scan did not reveal any recurrent disease.\par Patient is referred for a screening colonoscopy.  His last colonoscopy was 4 to 5 years ago.  His bowel movements are normal except for seeing some oil in the stool.  He does take Creon 3-4 times a day for some pancreatic insufficiency symptoms.\par Post his Whipple's, patient lost about 50 pounds.\par He does complain of some right upper quadrant postprandial discomfort and bloating.  He has minimal regurgitation or GERD symptoms.

## 2021-08-01 PROBLEM — Z00.00 ENCOUNTER FOR PREVENTIVE HEALTH EXAMINATION: Status: ACTIVE | Noted: 2018-10-22

## 2021-08-01 NOTE — ASSESSMENT
[Curative] : Goals of care discussed with patient: Curative [FreeTextEntry1] : Referral made to GI for routine screening colonoscopy \par pt with many questions, all answered to his satisfaction

## 2021-08-01 NOTE — HISTORY OF PRESENT ILLNESS
[Disease: _____________________] : Disease: [unfilled] [T: ___] : T[unfilled] [N: ___] : N[unfilled] [M: ___] : M[unfilled] [AJCC Stage: ____] : AJCC Stage: [unfilled] [de-identified] : 54 M developed painless jaundice in 2017. An US performed in [facility] revealed a pancreatic mass. One year prior to this, he was diagnosed with DM. + 50 lb weight loss during this time. His father  of pancreatic cancer in his 70s. He traveled to [facility] [place] and underwent an MRI Abd on 10/25/17 showed a 3.8 cm mass in the pancreatic head abutting the lateral wall of SMA and anterior wall of PV. PET/CT was negative for malignancy outside of the pancreas. Initial recommendation for was neoadjuvant chemotherapy. Patient sought a 2nd opinion from a surgeon and underwent an R1 Whipple with a rosalba-en-Y on 10/31/17. This was followed by 2 cycles of gemcitabine and capecitabine. Interim PET showed no disease, and he was then treated with 45 Gy radiation in 25 fractions to tumor bed and regional lymphatics with concurrent capecitabine. He followed this with 2 more cycles of gemcitabine and capecitabine. PET/CT after treatment in 2018 showed no recurrence of disease. [place] and was admitted to [facility] 10/2018 with small bowel obstruction. He is status post laparotomy and reduction of an internal hernia and revision of a strictured jejunal-jejunal anastomosis on 10/17/18. Pathology was benign. He has been on surveillance without evidence of recurrent disease. \par \par 19: CT A/P: negative for metastasis \par 20: CT CAP: neg for mets\par 20: CT CAP: GENO [de-identified] : well differentiated adenocarcinoma  [de-identified] : CA 19.9 <1  [FreeTextEntry1] : surveillance  [de-identified] : overall feels well. Had injection of R shoulder and receiving PT. \par has not had a colonoscopy since 2017. \par c/o difficulty swallowing certain foods sometimes. \par has questions regarding nutrition.

## 2021-08-01 NOTE — HISTORY OF PRESENT ILLNESS
[Disease: _____________________] : Disease: [unfilled] [T: ___] : T[unfilled] [N: ___] : N[unfilled] [M: ___] : M[unfilled] [AJCC Stage: ____] : AJCC Stage: [unfilled] [de-identified] : 54 M developed painless jaundice in 2017. An US performed in [facility] revealed a pancreatic mass. One year prior to this, he was diagnosed with DM. + 50 lb weight loss during this time. His father  of pancreatic cancer in his 70s. He traveled to [facility] and underwent an MRI Abd on 10/25/17 showed a 3.8 cm mass in the pancreatic head abutting the lateral wall of SMA and anterior wall of PV. PET/CT was negative for malignancy outside of the pancreas. Initial recommendation for was neoadjuvant chemotherapy. Patient sought a 2nd opinion from a surgeon and underwent an R1 Whipple with a rosalba-en-Y on 10/31/17. This was followed by 2 cycles of gemcitabine and capecitabine. Interim PET showed no disease, and he was then treated with 45 Gy radiation in 25 fractions to tumor bed and regional lymphatics with concurrent capecitabine. He followed this with 2 more cycles of gemcitabine and capecitabine. PET/CT after treatment in 2018 showed no recurrence of disease. [place] the [place] and was admitted to [facility] 10/2018 with small bowel obstruction. He is status post laparotomy and reduction of an internal hernia and revision of a strictured jejunal-jejunal anastomosis on 10/17/18. Pathology was benign. He has been on surveillance without evidence of recurrent disease. \par \par 19: CT A/P: negative for metastasis \par 20: CT CAP: neg for mets\par 20: CT CAP: GENO [de-identified] : well differentiated adenocarcinoma  [de-identified] : CA 19.9 <1  [FreeTextEntry1] : surveillance  [de-identified] : overall feels well. Had injection of R shoulder and receiving PT. \par has not had a colonoscopy since 2017. \par c/o difficulty swallowing certain foods sometimes. \par has questions regarding nutrition.

## 2021-09-07 DIAGNOSIS — Z20.822 CONTACT WITH AND (SUSPECTED) EXPOSURE TO COVID-19: ICD-10-CM

## 2021-09-09 ENCOUNTER — APPOINTMENT (OUTPATIENT)
Dept: GASTROENTEROLOGY | Facility: AMBULATORY MEDICAL SERVICES | Age: 56
End: 2021-09-09

## 2021-09-13 ENCOUNTER — APPOINTMENT (OUTPATIENT)
Dept: OPHTHALMOLOGY | Facility: CLINIC | Age: 56
End: 2021-09-13

## 2021-11-02 NOTE — PATIENT PROFILE ADULT - NSPROEDAREADYLEARN_GEN_A_NUR
10/29/21 LM at [phone] informing pt to call back to schedule as a new pt with JOSE for 11/17/21 at 1145a per Rnad.
[address] @ 7490 am.
2nd attempt 11/12/21 lm at [phone] to schedule as a new pt with JOANNB for 11/17/21 at [id number], do not double book if time is taken by another pt. Speak with alfredito or crystal for a date if that is the case.
Dr. Luz Elena Washburn requested this patient be scheduled with him as a new patient for Paroxysmal supraventricular tachycardia. George Navarro can you please schedule this patient. Thank You.
none

## 2022-01-05 LAB — SARS-COV-2 N GENE NPH QL NAA+PROBE: NOT DETECTED

## 2022-01-06 ENCOUNTER — APPOINTMENT (OUTPATIENT)
Dept: GASTROENTEROLOGY | Facility: AMBULATORY MEDICAL SERVICES | Age: 57
End: 2022-01-06
Payer: MEDICAID

## 2022-01-06 PROCEDURE — 43239 EGD BIOPSY SINGLE/MULTIPLE: CPT

## 2022-01-06 PROCEDURE — 45378 DIAGNOSTIC COLONOSCOPY: CPT | Mod: 33

## 2022-01-31 ENCOUNTER — OUTPATIENT (OUTPATIENT)
Dept: OUTPATIENT SERVICES | Facility: HOSPITAL | Age: 57
LOS: 1 days | Discharge: HOME | End: 2022-01-31

## 2022-01-31 DIAGNOSIS — C25.9 MALIGNANT NEOPLASM OF PANCREAS, UNSPECIFIED: ICD-10-CM

## 2022-02-10 ENCOUNTER — APPOINTMENT (OUTPATIENT)
Dept: CT IMAGING | Facility: IMAGING CENTER | Age: 57
End: 2022-02-10

## 2022-03-01 ENCOUNTER — NON-APPOINTMENT (OUTPATIENT)
Age: 57
End: 2022-03-01

## 2022-03-02 ENCOUNTER — APPOINTMENT (OUTPATIENT)
Dept: OPHTHALMOLOGY | Facility: CLINIC | Age: 57
End: 2022-03-02
Payer: MEDICAID

## 2022-03-02 PROCEDURE — 92012 INTRM OPH EXAM EST PATIENT: CPT

## 2022-03-02 PROCEDURE — 92083 EXTENDED VISUAL FIELD XM: CPT

## 2022-03-02 PROCEDURE — 92133 CPTRZD OPH DX IMG PST SGM ON: CPT

## 2022-04-19 ENCOUNTER — APPOINTMENT (OUTPATIENT)
Dept: OPHTHALMOLOGY | Facility: CLINIC | Age: 57
End: 2022-04-19
Payer: MEDICAID

## 2022-04-19 ENCOUNTER — NON-APPOINTMENT (OUTPATIENT)
Age: 57
End: 2022-04-19

## 2022-04-19 PROCEDURE — 92012 INTRM OPH EXAM EST PATIENT: CPT

## 2022-06-24 ENCOUNTER — OUTPATIENT (OUTPATIENT)
Dept: OUTPATIENT SERVICES | Facility: HOSPITAL | Age: 57
LOS: 1 days | Discharge: HOME | End: 2022-06-24

## 2022-06-24 DIAGNOSIS — C25.9 MALIGNANT NEOPLASM OF PANCREAS, UNSPECIFIED: ICD-10-CM

## 2022-07-07 ENCOUNTER — APPOINTMENT (OUTPATIENT)
Dept: HEMATOLOGY ONCOLOGY | Facility: CLINIC | Age: 57
End: 2022-07-07

## 2022-10-05 ENCOUNTER — APPOINTMENT (OUTPATIENT)
Dept: OPHTHALMOLOGY | Facility: CLINIC | Age: 57
End: 2022-10-05

## 2022-11-29 ENCOUNTER — OUTPATIENT (OUTPATIENT)
Dept: OUTPATIENT SERVICES | Facility: HOSPITAL | Age: 57
LOS: 1 days | Discharge: HOME | End: 2022-11-29

## 2022-11-29 DIAGNOSIS — C25.9 MALIGNANT NEOPLASM OF PANCREAS, UNSPECIFIED: ICD-10-CM

## 2022-12-01 ENCOUNTER — RESULT REVIEW (OUTPATIENT)
Age: 57
End: 2022-12-01

## 2022-12-01 ENCOUNTER — APPOINTMENT (OUTPATIENT)
Dept: HEMATOLOGY ONCOLOGY | Facility: CLINIC | Age: 57
End: 2022-12-01

## 2022-12-01 VITALS
OXYGEN SATURATION: 97 % | SYSTOLIC BLOOD PRESSURE: 128 MMHG | BODY MASS INDEX: 22.15 KG/M2 | RESPIRATION RATE: 16 BRPM | HEIGHT: 67.01 IN | DIASTOLIC BLOOD PRESSURE: 78 MMHG | WEIGHT: 141.09 LBS | HEART RATE: 80 BPM | TEMPERATURE: 97.8 F

## 2022-12-01 LAB
BASOPHILS # BLD AUTO: 0.03 K/UL — SIGNIFICANT CHANGE UP (ref 0–0.2)
BASOPHILS NFR BLD AUTO: 0.7 % — SIGNIFICANT CHANGE UP (ref 0–2)
EOSINOPHIL # BLD AUTO: 0.04 K/UL — SIGNIFICANT CHANGE UP (ref 0–0.5)
EOSINOPHIL NFR BLD AUTO: 0.9 % — SIGNIFICANT CHANGE UP (ref 0–6)
HCT VFR BLD CALC: 42.8 % — SIGNIFICANT CHANGE UP (ref 39–50)
HGB BLD-MCNC: 13.6 G/DL — SIGNIFICANT CHANGE UP (ref 13–17)
IMM GRANULOCYTES NFR BLD AUTO: 0.2 % — SIGNIFICANT CHANGE UP (ref 0–0.9)
LYMPHOCYTES # BLD AUTO: 1.25 K/UL — SIGNIFICANT CHANGE UP (ref 1–3.3)
LYMPHOCYTES # BLD AUTO: 29.1 % — SIGNIFICANT CHANGE UP (ref 13–44)
MCHC RBC-ENTMCNC: 26.5 PG — LOW (ref 27–34)
MCHC RBC-ENTMCNC: 31.8 G/DL — LOW (ref 32–36)
MCV RBC AUTO: 83.4 FL — SIGNIFICANT CHANGE UP (ref 80–100)
MONOCYTES # BLD AUTO: 0.4 K/UL — SIGNIFICANT CHANGE UP (ref 0–0.9)
MONOCYTES NFR BLD AUTO: 9.3 % — SIGNIFICANT CHANGE UP (ref 2–14)
NEUTROPHILS # BLD AUTO: 2.56 K/UL — SIGNIFICANT CHANGE UP (ref 1.8–7.4)
NEUTROPHILS NFR BLD AUTO: 59.8 % — SIGNIFICANT CHANGE UP (ref 43–77)
NRBC # BLD: 0 /100 WBCS — SIGNIFICANT CHANGE UP (ref 0–0)
PLATELET # BLD AUTO: 264 K/UL — SIGNIFICANT CHANGE UP (ref 150–400)
RBC # BLD: 5.13 M/UL — SIGNIFICANT CHANGE UP (ref 4.2–5.8)
RBC # FLD: 13.4 % — SIGNIFICANT CHANGE UP (ref 10.3–14.5)
WBC # BLD: 4.29 K/UL — SIGNIFICANT CHANGE UP (ref 3.8–10.5)
WBC # FLD AUTO: 4.29 K/UL — SIGNIFICANT CHANGE UP (ref 3.8–10.5)

## 2022-12-01 PROCEDURE — 99214 OFFICE O/P EST MOD 30 MIN: CPT

## 2022-12-01 RX ORDER — FLASH GLUCOSE SCANNING READER
EACH MISCELLANEOUS
Qty: 1 | Refills: 0 | Status: ACTIVE | COMMUNITY
Start: 2018-11-30 | End: 1900-01-01

## 2022-12-01 NOTE — HISTORY OF PRESENT ILLNESS
[de-identified] : 56 M developed painless jaundice in 2017. An US performed in [facility] revealed a pancreatic mass. One year prior to this, he was diagnosed with DM. + 50 lb weight loss during this time. His father  of pancreatic cancer in his 70s. He traveled to [facility] and underwent an MRI Abd on 10/25/17 showed a 3.8 cm mass in the pancreatic head abutting the lateral wall of SMA and anterior wall of PV. PET/CT was negative for malignancy outside of the pancreas. Initial recommendation for was neoadjuvant chemotherapy. Patient sought a 2nd opinion from a surgeon and underwent an R1 Whipple with a rosalba-en-Y on 10/31/17. This was followed by 2 cycles of gemcitabine and capecitabine. Interim PET showed no disease, and he was then treated with 45 Gy radiation in 25 fractions to tumor bed and regional lymphatics with concurrent capecitabine. He followed this with 2 more cycles of gemcitabine and capecitabine. PET/CT after treatment in 2018 showed no recurrence of disease. [place] and was admitted to [facility] 10/2018 with small bowel obstruction. He is status post laparotomy and reduction of an internal hernia and revision of a strictured jejunal-jejunal anastomosis on 10/17/18. Pathology was benign. He has been on surveillance without evidence of recurrent disease. \par \par 19: CT A/P: negative for metastasis \par 20: CT CAP: neg for mets\par 20: CT CAP: GENO. \par 22: PET scan from [facility] in [facility]. Overall no suspicious hypermetabolic lesions. s/p Whipple, partial gastrectomy, cholecystectomy, no suspicious FDG avid lesions in post-surgical bed or remnant pancreas. No suspicious FDG avid abdominal or pelvic nodes. No significant ascites. Stable tiny lung nodules, non-specific. \par      CEA: 5.3\par \par Disease: Pancreatic cancer \par Pathology: well differentiated adenocarcinoma \par TNM stage: T2, N2, M0 \par AJCC Stage: III \par Tumor Markers: CA 19.9 <1  [de-identified] : Patient is doing well, he has no complaints. Has gained weight, feels better when he is in his home [place] [facility].

## 2022-12-01 NOTE — ASSESSMENT
[FreeTextEntry1] : \par  · 56 M w/ Stage III pancreatic cancer s/p resection and adjuvant chemotherapy/RT on\par     surveillance since 2018\par     \par #Pancreatic adenocarcinoma\par     - lab studies from Sept 2022 reviewed, will repeat here today \par      - PET/CT from [facility] 9/22 without evidence of disease \par     - scheduled for f/u CT CAP on 7/19/21 to r/o recurrence.\par     - had f/u with GI for surveillance EGD/colonoscopy Jan 2022 without evidence of malignancy \par     - will continue pancreatic surveillance for a total of 5 yrs \par     - RTC in 6 months.\par \par Diabetes mellitus- on insulin \par  · - f/u with PCP for management\par     - most recent note + meds list requested.

## 2022-12-05 ENCOUNTER — APPOINTMENT (OUTPATIENT)
Dept: HEMATOLOGY ONCOLOGY | Facility: CLINIC | Age: 57
End: 2022-12-05

## 2022-12-05 LAB
ALBUMIN SERPL ELPH-MCNC: 4.3 G/DL
ALP BLD-CCNC: 113 U/L
ALT SERPL-CCNC: 36 U/L
ANION GAP SERPL CALC-SCNC: 10 MMOL/L
AST SERPL-CCNC: 24 U/L
BILIRUB SERPL-MCNC: 1 MG/DL
BUN SERPL-MCNC: 11 MG/DL
CALCIUM SERPL-MCNC: 9.7 MG/DL
CANCER AG19-9 SERPL-ACNC: <2 U/ML
CEA SERPL-MCNC: 3.5 NG/ML
CHLORIDE SERPL-SCNC: 101 MMOL/L
CO2 SERPL-SCNC: 28 MMOL/L
CREAT SERPL-MCNC: 0.91 MG/DL
EGFR: 99 ML/MIN/1.73M2
GLUCOSE SERPL-MCNC: 322 MG/DL
POTASSIUM SERPL-SCNC: 4.4 MMOL/L
PROT SERPL-MCNC: 7.3 G/DL
SODIUM SERPL-SCNC: 139 MMOL/L

## 2022-12-07 ENCOUNTER — APPOINTMENT (OUTPATIENT)
Dept: OPHTHALMOLOGY | Facility: CLINIC | Age: 57
End: 2022-12-07

## 2022-12-07 ENCOUNTER — NON-APPOINTMENT (OUTPATIENT)
Age: 57
End: 2022-12-07

## 2022-12-07 PROCEDURE — 92250 FUNDUS PHOTOGRAPHY W/I&R: CPT

## 2022-12-07 PROCEDURE — 92014 COMPRE OPH EXAM EST PT 1/>: CPT

## 2022-12-14 ENCOUNTER — EMERGENCY (EMERGENCY)
Facility: HOSPITAL | Age: 57
LOS: 1 days | Discharge: HOME | End: 2022-12-14
Admitting: EMERGENCY MEDICINE

## 2022-12-14 VITALS
DIASTOLIC BLOOD PRESSURE: 50 MMHG | HEART RATE: 66 BPM | TEMPERATURE: 98 F | OXYGEN SATURATION: 100 % | HEIGHT: 67.01 IN | SYSTOLIC BLOOD PRESSURE: 107 MMHG | RESPIRATION RATE: 18 BRPM

## 2022-12-14 PROCEDURE — 93010 ELECTROCARDIOGRAM REPORT: CPT

## 2022-12-14 PROCEDURE — 99284 EMERGENCY DEPT VISIT MOD MDM: CPT

## 2022-12-14 NOTE — ED ADULT TRIAGE NOTE - CHIEF COMPLAINT QUOTE
Pt c/o chest pain with nausea/vomiting starting at 5pm tonight. Denies sob, abd pain, diarrhea, fevers/chills. Pmhx: DM, Partial gallbladder and pancreas removal.

## 2022-12-15 LAB
ALBUMIN SERPL ELPH-MCNC: 4.4 G/DL — SIGNIFICANT CHANGE UP (ref 3.3–5)
ALP SERPL-CCNC: 93 U/L — SIGNIFICANT CHANGE UP (ref 40–120)
ALT FLD-CCNC: 33 U/L — SIGNIFICANT CHANGE UP (ref 4–41)
ANION GAP SERPL CALC-SCNC: 12 MMOL/L — SIGNIFICANT CHANGE UP (ref 7–14)
APPEARANCE UR: CLEAR — SIGNIFICANT CHANGE UP
AST SERPL-CCNC: 32 U/L — SIGNIFICANT CHANGE UP (ref 4–40)
BASOPHILS # BLD AUTO: 0.03 K/UL — SIGNIFICANT CHANGE UP (ref 0–0.2)
BASOPHILS NFR BLD AUTO: 0.6 % — SIGNIFICANT CHANGE UP (ref 0–2)
BILIRUB SERPL-MCNC: 0.4 MG/DL — SIGNIFICANT CHANGE UP (ref 0.2–1.2)
BILIRUB UR-MCNC: NEGATIVE — SIGNIFICANT CHANGE UP
BUN SERPL-MCNC: 15 MG/DL — SIGNIFICANT CHANGE UP (ref 7–23)
CALCIUM SERPL-MCNC: 9.8 MG/DL — SIGNIFICANT CHANGE UP (ref 8.4–10.5)
CHLORIDE SERPL-SCNC: 103 MMOL/L — SIGNIFICANT CHANGE UP (ref 98–107)
CO2 SERPL-SCNC: 25 MMOL/L — SIGNIFICANT CHANGE UP (ref 22–31)
COLOR SPEC: SIGNIFICANT CHANGE UP
CREAT SERPL-MCNC: 0.82 MG/DL — SIGNIFICANT CHANGE UP (ref 0.5–1.3)
DIFF PNL FLD: NEGATIVE — SIGNIFICANT CHANGE UP
EGFR: 103 ML/MIN/1.73M2 — SIGNIFICANT CHANGE UP
EOSINOPHIL # BLD AUTO: 0.14 K/UL — SIGNIFICANT CHANGE UP (ref 0–0.5)
EOSINOPHIL NFR BLD AUTO: 2.8 % — SIGNIFICANT CHANGE UP (ref 0–6)
GLUCOSE SERPL-MCNC: 181 MG/DL — HIGH (ref 70–99)
GLUCOSE UR QL: ABNORMAL
HCT VFR BLD CALC: 41.9 % — SIGNIFICANT CHANGE UP (ref 39–50)
HGB BLD-MCNC: 13.4 G/DL — SIGNIFICANT CHANGE UP (ref 13–17)
IANC: 2.69 K/UL — SIGNIFICANT CHANGE UP (ref 1.8–7.4)
IMM GRANULOCYTES NFR BLD AUTO: 0.2 % — SIGNIFICANT CHANGE UP (ref 0–0.9)
KETONES UR-MCNC: NEGATIVE — SIGNIFICANT CHANGE UP
LEUKOCYTE ESTERASE UR-ACNC: NEGATIVE — SIGNIFICANT CHANGE UP
LIDOCAIN IGE QN: 8 U/L — SIGNIFICANT CHANGE UP (ref 7–60)
LYMPHOCYTES # BLD AUTO: 1.61 K/UL — SIGNIFICANT CHANGE UP (ref 1–3.3)
LYMPHOCYTES # BLD AUTO: 32.7 % — SIGNIFICANT CHANGE UP (ref 13–44)
MCHC RBC-ENTMCNC: 26.6 PG — LOW (ref 27–34)
MCHC RBC-ENTMCNC: 32 GM/DL — SIGNIFICANT CHANGE UP (ref 32–36)
MCV RBC AUTO: 83.3 FL — SIGNIFICANT CHANGE UP (ref 80–100)
MONOCYTES # BLD AUTO: 0.45 K/UL — SIGNIFICANT CHANGE UP (ref 0–0.9)
MONOCYTES NFR BLD AUTO: 9.1 % — SIGNIFICANT CHANGE UP (ref 2–14)
NEUTROPHILS # BLD AUTO: 2.69 K/UL — SIGNIFICANT CHANGE UP (ref 1.8–7.4)
NEUTROPHILS NFR BLD AUTO: 54.6 % — SIGNIFICANT CHANGE UP (ref 43–77)
NITRITE UR-MCNC: NEGATIVE — SIGNIFICANT CHANGE UP
NRBC # BLD: 0 /100 WBCS — SIGNIFICANT CHANGE UP (ref 0–0)
NRBC # FLD: 0 K/UL — SIGNIFICANT CHANGE UP (ref 0–0)
PH UR: 5.5 — SIGNIFICANT CHANGE UP (ref 5–8)
PLATELET # BLD AUTO: 159 K/UL — SIGNIFICANT CHANGE UP (ref 150–400)
POTASSIUM SERPL-MCNC: 4.2 MMOL/L — SIGNIFICANT CHANGE UP (ref 3.5–5.3)
POTASSIUM SERPL-SCNC: 4.2 MMOL/L — SIGNIFICANT CHANGE UP (ref 3.5–5.3)
PROT SERPL-MCNC: 7.4 G/DL — SIGNIFICANT CHANGE UP (ref 6–8.3)
PROT UR-MCNC: NEGATIVE — SIGNIFICANT CHANGE UP
RBC # BLD: 5.03 M/UL — SIGNIFICANT CHANGE UP (ref 4.2–5.8)
RBC # FLD: 13.7 % — SIGNIFICANT CHANGE UP (ref 10.3–14.5)
SODIUM SERPL-SCNC: 140 MMOL/L — SIGNIFICANT CHANGE UP (ref 135–145)
SP GR SPEC: 1.01 — LOW (ref 1.01–1.05)
UROBILINOGEN FLD QL: SIGNIFICANT CHANGE UP
WBC # BLD: 4.93 K/UL — SIGNIFICANT CHANGE UP (ref 3.8–10.5)
WBC # FLD AUTO: 4.93 K/UL — SIGNIFICANT CHANGE UP (ref 3.8–10.5)

## 2022-12-15 RX ORDER — SODIUM CHLORIDE 9 MG/ML
1000 INJECTION INTRAMUSCULAR; INTRAVENOUS; SUBCUTANEOUS ONCE
Refills: 0 | Status: COMPLETED | OUTPATIENT
Start: 2022-12-15 | End: 2022-12-15

## 2022-12-15 RX ADMIN — SODIUM CHLORIDE 1000 MILLILITER(S): 9 INJECTION INTRAMUSCULAR; INTRAVENOUS; SUBCUTANEOUS at 01:18

## 2022-12-15 NOTE — ED PROVIDER NOTE - NSFOLLOWUPINSTRUCTIONS_ED_ALL_ED_FT
Rest, drink plenty of fluids.  Advance activity as tolerated.  Continue all previously prescribed medications as directed.  Follow up with your primary care physician in 48-72 hours- bring copies of your results.  Return to the ER for worsening or persistent symptoms, and/or ANY NEW OR CONCERNING SYMPTOMS. If you have issues obtaining follow up, please call: [phone] to obtain a doctor or specialist who takes your insurance in your area.  You may call [phone] to make an appointment with the internal medicine clinic.

## 2022-12-15 NOTE — ED PROVIDER NOTE - PATIENT PORTAL LINK FT
You can access the FollowMyHealth Patient Portal offered by [facility] by registering at the following website: [email or web address]. By joining [employer]’s FollowMyHealth portal, you will also be able to view your health information using other applications (apps) compatible with our system.

## 2022-12-15 NOTE — ED PROVIDER NOTE - CLINICAL SUMMARY MEDICAL DECISION MAKING FREE TEXT BOX
This is a 57-year-old male with a past medical history of diabetes also a past medical history of adenocarcinoma of the pancreas status post Whipple surgery and [place] status post short proximal jejunal obstruction surgery he is currently not on any chemo or radiation at this time he is in remission presented to the emergency room complaining having some abdominal cramping

## 2022-12-15 NOTE — ED PROVIDER NOTE - OBJECTIVE STATEMENT
This is a 57-year-old male with a past medical history of diabetes also a past medical history of adenocarcinoma of the pancreas status post Whipple surgery and [place] status post short proximal jejunal obstruction surgery he is currently not on any chemo or radiation at this time he is in remission presented to the emergency room complaining having some abdominal cramping after he had milk earlier today patient states that he noticed that his sugar was going down and was about 64 thus he ate a bunch of snacks including drink some milk which upset his stomach he felt bloated and nauseous which lasted for approximately an hour, he did not have any episodes of vomiting he denies having any fever chills body aches he states that he did not have any chest pain contrary to triage note.  He states that he is currently feeling better he wants to eat and is would like to go home.  He denies having any abdominal bloating or distention at this time denies any abdominal pain denies having any headaches dizziness nausea vomiting or diarrhea.  He is requesting to drink water and eat some crackers which she did without difficulties he denies having any recent travel no sick contacts no chest pain shortness of breath exertional dyspnea.

## 2023-01-04 ENCOUNTER — APPOINTMENT (OUTPATIENT)
Dept: OPHTHALMOLOGY | Facility: CLINIC | Age: 58
End: 2023-01-04

## 2023-01-31 ENCOUNTER — OUTPATIENT (OUTPATIENT)
Dept: OUTPATIENT SERVICES | Facility: HOSPITAL | Age: 58
LOS: 1 days | Discharge: HOME | End: 2023-01-31

## 2023-01-31 DIAGNOSIS — C25.9 MALIGNANT NEOPLASM OF PANCREAS, UNSPECIFIED: ICD-10-CM

## 2023-02-06 ENCOUNTER — APPOINTMENT (OUTPATIENT)
Dept: HEMATOLOGY ONCOLOGY | Facility: CLINIC | Age: 58
End: 2023-02-06
Payer: MEDICAID

## 2023-02-06 VITALS
DIASTOLIC BLOOD PRESSURE: 77 MMHG | WEIGHT: 143.74 LBS | TEMPERATURE: 96.6 F | HEIGHT: 67.01 IN | SYSTOLIC BLOOD PRESSURE: 119 MMHG | HEART RATE: 64 BPM | BODY MASS INDEX: 22.56 KG/M2 | OXYGEN SATURATION: 97 % | RESPIRATION RATE: 16 BRPM

## 2023-02-06 PROCEDURE — 99213 OFFICE O/P EST LOW 20 MIN: CPT

## 2023-02-06 NOTE — HISTORY OF PRESENT ILLNESS
[Disease: _____________________] : Disease: [unfilled] [T: ___] : T[unfilled] [N: ___] : N[unfilled] [M: ___] : M[unfilled] [AJCC Stage: ____] : AJCC Stage: [unfilled] [de-identified] : 57 M developed painless jaundice in 2017. An US performed in [facility] revealed a pancreatic mass. One year prior to this, he was diagnosed with DM. + 50 lb weight loss during this time. His father  of pancreatic cancer in his 70s. He traveled to [facility] and underwent an MRI Abd on 10/25/17 showed a 3.8 cm mass in the pancreatic head abutting the lateral wall of SMA and anterior wall of PV. PET/CT was negative for malignancy outside of the pancreas. Initial recommendation for was neoadjuvant chemotherapy. Patient sought a 2nd opinion from a surgeon and underwent an R1 Whipple with a rosalba-en-Y on 10/31/17. This was followed by 2 cycles of gemcitabine and capecitabine. Interim PET showed no disease, and he was then treated with 45 Gy radiation in 25 fractions to tumor bed and regional lymphatics with concurrent capecitabine. He followed this with 2 more cycles of gemcitabine and capecitabine. PET/CT after treatment in 2018 showed no recurrence of disease. [place] the [place] and was admitted to [facility] 10/2018 with small bowel obstruction. He is status post laparotomy and reduction of an internal hernia and revision of a strictured jejunal-jejunal anastomosis on 10/17/18. Pathology was benign. He has been on surveillance without evidence of recurrent disease. \par \par 19: CT A/P: negative for metastasis \par 20: CT CAP: neg for mets\par 20: CT CAP: GENO\par 22: PET scan from [facility] in [facility]. Overall no suspicious hypermetabolic lesions. s/p Whipple, partial gastrectomy, cholecystectomy, no suspicious FDG avid lesions in post-surgical bed or remnant pancreas. No suspicious FDG avid abdominal or pelvic nodes. No significant ascites. Stable tiny lung nodules, non-specific.  [de-identified] : well differentiated adenocarcinoma  [de-identified] : CA 19.9 <1  [FreeTextEntry1] : surveillance  [de-identified] : Patient presented to the office for routine surveillance visit. He currently feels fine and reported no acute complaints. Has gained weight, feels better when he is in his home country of [place] (considering moving there).

## 2023-04-03 NOTE — ED PROCEDURE NOTE - EBL
3/15/2023  IFrances MD, saw and evaluated the patient  I have discussed the patient with the resident/non-physician practitioner and agree with the resident's/non-physician practitioner's findings, Plan of Care, and MDM as documented in the resident's/non-physician practitioner's note, except where noted  All available labs and Radiology studies were reviewed  I was present for key portions of any procedure(s) performed by the resident/non-physician practitioner and I was immediately available to provide assistance  At this point I agree with the current assessment done in the Emergency Department  I have conducted an independent evaluation of this patient a history and physical is as follows:    ED Course      Patient presents for evaluation due to 1 day of right testicular pain  Patient has had similar in the past and had an ultrasound previously  Patient denies any trauma or urinary complaints  Pain is worse with walking  He was seen at the pediatrician who was concerned due to a high riding testicle  No additional complaints  Exam: AAOx3, NAD, right testicular tenderness, right testicle high riding  A/P: Testicular pain  Will check ultrasound to evaluate for torsion      Critical Care Time  Procedures
none

## 2023-04-17 NOTE — PATIENT PROFILE ADULT - ARE SIGNIFICANT INDICATORS COMPLETE.
BLOOD PRESSURE CUFF    Get a blood pressure cuff that goes on the arm. Do not get the wrist cuff. Check your blood pressures on your left arm 2-3 times per week at home. Do this with your left arm supported by a table, do not have it hanging down at your side. Make sure you do this before you have any coffee or caffeine. Go ahead and write these numbers down a piece of paper for me. After you get about 10 to 14 days worth of numbers send them to me through [address] [employer] is a very reputable brand. No Yes

## 2023-06-14 ENCOUNTER — APPOINTMENT (OUTPATIENT)
Dept: OPHTHALMOLOGY | Facility: CLINIC | Age: 58
End: 2023-06-14

## 2023-06-24 ENCOUNTER — EMERGENCY (EMERGENCY)
Facility: HOSPITAL | Age: 58
LOS: 1 days | Discharge: LEFT BEFORE DISCHARGE | End: 2023-06-24
Attending: EMERGENCY MEDICINE | Admitting: EMERGENCY MEDICINE
Payer: MEDICAID

## 2023-06-24 VITALS
HEART RATE: 82 BPM | RESPIRATION RATE: 18 BRPM | DIASTOLIC BLOOD PRESSURE: 81 MMHG | SYSTOLIC BLOOD PRESSURE: 113 MMHG | TEMPERATURE: 98 F | OXYGEN SATURATION: 100 %

## 2023-06-24 PROCEDURE — 99283 EMERGENCY DEPT VISIT LOW MDM: CPT | Mod: 25

## 2023-06-24 PROCEDURE — 10061 I&D ABSCESS COMP/MULTIPLE: CPT

## 2023-06-24 NOTE — ED PROVIDER NOTE - PATIENT PORTAL LINK FT
You can access the FollowMyHealth Patient Portal offered by [facility] by registering at the following website: [email or web address]. By joining [employer]’s FollowMyHealth portal, you will also be able to view your health information using other applications (apps) compatible with our system.

## 2023-06-24 NOTE — ED ADULT TRIAGE NOTE - CHIEF COMPLAINT QUOTE
Pt with right thumb paronychia x 2 weeks. pt was started on doxycycline yesterday. Pt with no fever. .

## 2023-06-24 NOTE — ED PROVIDER NOTE - NSFOLLOWUPINSTRUCTIONS_ED_ALL_ED_FT
You were seen in the emergency department for an infection of the nailbed known as a paronychia.  We opened up the paronychia and drained the bacteria inside of it    Please continue taking the doxycycline as prescribed    You may receive a call with results from your bacterial culture    Please return to the Emergency Department should you experience any of the following:  -Inability to bend or straighten your finger  - Fevers or chills  - worsening infection  - Any new concerning symptom

## 2023-06-24 NOTE — ED PROVIDER NOTE - OBJECTIVE STATEMENT
56yo M pmh T2DM presents to ED for a R thumb paronychia requesting help draining it. Pt reports has had pain and swelling in that region for past 3-4 days, saw PCP yesterday who started him on doxycycline yesterday and advised pt to keon the paronychia today, however pt feels he is unable to do it himself. No fevers or chills, no other comlaints. Pain is localized to lateral nail bed of 5th digit of R hand, no redness, no discharge. reports full flexion and extension of finger.

## 2023-06-24 NOTE — ED ADULT NURSE NOTE - NSFALLUNIVINTERV_ED_ALL_ED
Bed/Stretcher in lowest position, wheels locked, appropriate side rails in place/Call bell, personal items and telephone in reach/Instruct patient to call for assistance before getting out of bed/chair/stretcher/Non-slip footwear applied when patient is off stretcher/[place] to call system/Physically safe environment - no spills, clutter or unnecessary equipment/Purposeful proactive rounding/Room/bathroom lighting operational, light cord in reach

## 2023-06-24 NOTE — ED PROVIDER NOTE - PHYSICAL EXAMINATION
Right hand fifth digit with tender fluctuant region on the lateral (ulnar) aspect of the nailbed no discharge, no erythema, no lymphatic streaking.  Full range of motion at DIP, full flexion of the thumb with normal opposition.  No tenderness of the flexor or extensor tendon.  Patient is generally well-appearing, no acute distress, speaking full sentences, mentating well.

## 2023-06-24 NOTE — ED PROVIDER NOTE - CLINICAL SUMMARY MEDICAL DECISION MAKING FREE TEXT BOX
57-year-old gentleman history of diabetes here with paronychia requesting help with drainage.  No systemic symptoms, physical exam remarkable for paronychia amenable to bedside I&D.  We will have patient continue taking doxycycline after lancing.  Fingerstick currently is 119 vitals are stable.  Plan to discharge thereafter

## 2023-06-24 NOTE — ED ADULT NURSE NOTE - OBJECTIVE STATEMENT
Received patient in room 5 c/o right thumb infection x 3 days, patient denies fever, chills, SOB, chest pain. Patient is A&OX4, airway patent, breathing unlabored and even. Safety maintained. Call bells within reach. Received patient in room 5 c/o right thumb paronychia x 3-4 days w/swelling, pain. Patient denies fever, chills, SOB, chest pain. Patient is A&OX4, airway patent, breathing unlabored and even. Safety maintained. Call bells within reach.

## 2023-06-26 LAB
-  AMIKACIN: SIGNIFICANT CHANGE UP
-  AMOXICILLIN/CLAVULANIC ACID: SIGNIFICANT CHANGE UP
-  AMPICILLIN/SULBACTAM: SIGNIFICANT CHANGE UP
-  AMPICILLIN/SULBACTAM: SIGNIFICANT CHANGE UP
-  AMPICILLIN: SIGNIFICANT CHANGE UP
-  AZTREONAM: SIGNIFICANT CHANGE UP
-  CEFAZOLIN: SIGNIFICANT CHANGE UP
-  CEFAZOLIN: SIGNIFICANT CHANGE UP
-  CEFEPIME: SIGNIFICANT CHANGE UP
-  CEFOXITIN: SIGNIFICANT CHANGE UP
-  CEFTRIAXONE: SIGNIFICANT CHANGE UP
-  CIPROFLOXACIN: SIGNIFICANT CHANGE UP
-  CLINDAMYCIN: SIGNIFICANT CHANGE UP
-  ERTAPENEM: SIGNIFICANT CHANGE UP
-  ERYTHROMYCIN: SIGNIFICANT CHANGE UP
-  GENTAMICIN: SIGNIFICANT CHANGE UP
-  GENTAMICIN: SIGNIFICANT CHANGE UP
-  LEVOFLOXACIN: SIGNIFICANT CHANGE UP
-  MEROPENEM: SIGNIFICANT CHANGE UP
-  OXACILLIN: SIGNIFICANT CHANGE UP
-  PENICILLIN: SIGNIFICANT CHANGE UP
-  PIPERACILLIN/TAZOBACTAM: SIGNIFICANT CHANGE UP
-  RIFAMPIN: SIGNIFICANT CHANGE UP
-  TETRACYCLINE: SIGNIFICANT CHANGE UP
-  TOBRAMYCIN: SIGNIFICANT CHANGE UP
-  TRIMETHOPRIM/SULFAMETHOXAZOLE: SIGNIFICANT CHANGE UP
-  TRIMETHOPRIM/SULFAMETHOXAZOLE: SIGNIFICANT CHANGE UP
-  VANCOMYCIN: SIGNIFICANT CHANGE UP
CULTURE RESULTS: SIGNIFICANT CHANGE UP
METHOD TYPE: SIGNIFICANT CHANGE UP
METHOD TYPE: SIGNIFICANT CHANGE UP
ORGANISM # SPEC MICROSCOPIC CNT: SIGNIFICANT CHANGE UP
SPECIMEN SOURCE: SIGNIFICANT CHANGE UP

## 2023-06-26 NOTE — ED POST DISCHARGE NOTE - RESULT SUMMARY
CX; abcess finger pluralibacter esdras and staph aureus. Patient on Doxycline which is sensitive to staph aures but not listed for pluralibacter. Patient contacted finger healing well. S/P I &D Discussed with patient to follow up with MD. Discussed with patient need to return to ED if symptoms don't continue to improve or recur or develops any new or worsening symptoms that are of concern.

## 2023-07-06 ENCOUNTER — OUTPATIENT (OUTPATIENT)
Dept: OUTPATIENT SERVICES | Facility: HOSPITAL | Age: 58
LOS: 1 days | Discharge: HOME | End: 2023-07-06

## 2023-07-06 ENCOUNTER — APPOINTMENT (OUTPATIENT)
Dept: CT IMAGING | Facility: IMAGING CENTER | Age: 58
End: 2023-07-06
Payer: MEDICAID

## 2023-07-06 ENCOUNTER — OUTPATIENT (OUTPATIENT)
Dept: OUTPATIENT SERVICES | Facility: HOSPITAL | Age: 58
LOS: 1 days | End: 2023-07-06
Payer: MEDICAID

## 2023-07-06 DIAGNOSIS — C25.9 MALIGNANT NEOPLASM OF PANCREAS, UNSPECIFIED: ICD-10-CM

## 2023-07-06 PROCEDURE — 71260 CT THORAX DX C+: CPT | Mod: 26

## 2023-07-06 PROCEDURE — 74177 CT ABD & PELVIS W/CONTRAST: CPT

## 2023-07-06 PROCEDURE — 74177 CT ABD & PELVIS W/CONTRAST: CPT | Mod: 26

## 2023-07-06 PROCEDURE — 71260 CT THORAX DX C+: CPT

## 2023-07-07 ENCOUNTER — APPOINTMENT (OUTPATIENT)
Dept: HEMATOLOGY ONCOLOGY | Facility: CLINIC | Age: 58
End: 2023-07-07

## 2023-07-09 LAB — CANCER AG19-9 SERPL-ACNC: <2 U/ML

## 2023-07-10 ENCOUNTER — APPOINTMENT (OUTPATIENT)
Dept: HEMATOLOGY ONCOLOGY | Facility: CLINIC | Age: 58
End: 2023-07-10

## 2023-07-11 ENCOUNTER — APPOINTMENT (OUTPATIENT)
Dept: HEMATOLOGY ONCOLOGY | Facility: CLINIC | Age: 58
End: 2023-07-11
Payer: MEDICAID

## 2023-07-11 VITALS
HEART RATE: 72 BPM | DIASTOLIC BLOOD PRESSURE: 80 MMHG | SYSTOLIC BLOOD PRESSURE: 121 MMHG | RESPIRATION RATE: 16 BRPM | OXYGEN SATURATION: 98 % | BODY MASS INDEX: 21.4 KG/M2 | TEMPERATURE: 96.6 F | WEIGHT: 136.68 LBS

## 2023-07-11 DIAGNOSIS — K86.89 OTHER SPECIFIED DISEASES OF PANCREAS: ICD-10-CM

## 2023-07-11 DIAGNOSIS — E11.9 TYPE 2 DIABETES MELLITUS W/OUT COMPLICATIONS: ICD-10-CM

## 2023-07-11 PROCEDURE — 99213 OFFICE O/P EST LOW 20 MIN: CPT

## 2023-07-11 RX ORDER — PANCRELIPASE 60000; 12000; 38000 [USP'U]/1; [USP'U]/1; [USP'U]/1
12000-38000 CAPSULE, DELAYED RELEASE PELLETS ORAL
Qty: 240 | Refills: 2 | Status: ACTIVE | COMMUNITY
Start: 2018-11-15 | End: 1900-01-01

## 2023-07-11 NOTE — HISTORY OF PRESENT ILLNESS
[Disease: _____________________] : Disease: [unfilled] [T: ___] : T[unfilled] [N: ___] : N[unfilled] [M: ___] : M[unfilled] [AJCC Stage: ____] : AJCC Stage: [unfilled] [de-identified] : 57 M developed painless jaundice in 2017. An US performed in [facility] revealed a pancreatic mass. One year prior to this, he was diagnosed with DM. + 50 lb weight loss during this time. His father  of pancreatic cancer in his 70s. He traveled to [facility] and underwent an MRI Abd on 10/25/17 showed a 3.8 cm mass in the pancreatic head abutting the lateral wall of SMA and anterior wall of PV. PET/CT was negative for malignancy outside of the pancreas. Initial recommendation for was neoadjuvant chemotherapy. Patient sought a 2nd opinion from a surgeon and underwent an R1 Whipple with a rosalba-en-Y on 10/31/17. This was followed by 2 cycles of gemcitabine and capecitabine. Interim PET showed no disease, and he was then treated with 45 Gy radiation in 25 fractions to tumor bed and regional lymphatics with concurrent capecitabine. He followed this with 2 more cycles of gemcitabine and capecitabine. PET/CT after treatment in 2018 showed no recurrence of disease. [place] and was admitted to [facility] 10/2018 with small bowel obstruction. He is status post laparotomy and reduction of an internal hernia and revision of a strictured jejunal-jejunal anastomosis on 10/17/18. Pathology was benign. He has been on surveillance without evidence of recurrent disease. \par \par 19: CT A/P: negative for metastasis \par 20: CT CAP: neg for mets\par 20: CT CAP: GENO\par 22: PET scan from [facility] in [facility]. Overall no suspicious hypermetabolic lesions. s/p Whipple, partial gastrectomy, cholecystectomy, no suspicious FDG avid lesions in post-surgical bed or remnant pancreas. No suspicious FDG avid abdominal or pelvic nodes. No significant ascites. Stable tiny lung nodules, non-specific. \par 23: CT CAP: GENO [de-identified] : well differentiated adenocarcinoma [de-identified] : ca 19-9 <1 [FreeTextEntry1] : surveillance [de-identified] : patient here with his son for clinical evaluation. Doing well. No new complaints

## 2023-07-11 NOTE — REASON FOR VISIT
[Follow-Up Visit] : a follow-up [Family Member] : family member [FreeTextEntry2] : Pancreatic Cancer on Surveillance

## 2023-07-17 LAB — ALP BLD-CCNC: 103 U/L

## 2023-07-19 NOTE — ED PROVIDER NOTE - TEMPLATE
General Qbrexza Counseling:  I discussed with the patient the risks of Qbrexza including but not limited to headache, mydriasis, blurred vision, dry eyes, nasal dryness, dry mouth, dry throat, dry skin, urinary hesitation, and constipation.  Local skin reactions including erythema, burning, stinging, and itching can also occur.

## 2023-08-02 ENCOUNTER — APPOINTMENT (OUTPATIENT)
Dept: HEMATOLOGY ONCOLOGY | Facility: CLINIC | Age: 58
End: 2023-08-02
Payer: MEDICAID

## 2023-08-02 VITALS
WEIGHT: 139.33 LBS | TEMPERATURE: 97.5 F | SYSTOLIC BLOOD PRESSURE: 115 MMHG | BODY MASS INDEX: 21.82 KG/M2 | OXYGEN SATURATION: 96 % | DIASTOLIC BLOOD PRESSURE: 75 MMHG | HEART RATE: 69 BPM | RESPIRATION RATE: 16 BRPM

## 2023-08-02 DIAGNOSIS — C25.9 MALIGNANT NEOPLASM OF PANCREAS, UNSPECIFIED: ICD-10-CM

## 2023-08-02 PROCEDURE — 99213 OFFICE O/P EST LOW 20 MIN: CPT

## 2023-08-09 ENCOUNTER — APPOINTMENT (OUTPATIENT)
Dept: OPHTHALMOLOGY | Facility: CLINIC | Age: 58
End: 2023-08-09
Payer: MEDICAID

## 2023-08-09 ENCOUNTER — NON-APPOINTMENT (OUTPATIENT)
Age: 58
End: 2023-08-09

## 2023-08-09 ENCOUNTER — APPOINTMENT (OUTPATIENT)
Dept: HEMATOLOGY ONCOLOGY | Facility: CLINIC | Age: 58
End: 2023-08-09

## 2023-08-09 PROCEDURE — 92012 INTRM OPH EXAM EST PATIENT: CPT | Mod: 25

## 2023-08-09 PROCEDURE — 92083 EXTENDED VISUAL FIELD XM: CPT

## 2023-08-09 PROCEDURE — 92133 CPTRZD OPH DX IMG PST SGM ON: CPT

## 2023-08-20 PROBLEM — C25.9 PANCREATIC ADENOCARCINOMA: Status: ACTIVE | Noted: 2018-11-01

## 2023-08-20 NOTE — HISTORY OF PRESENT ILLNESS
[Disease: _____________________] : Disease: [unfilled] [T: ___] : T[unfilled] [N: ___] : N[unfilled] [M: ___] : M[unfilled] [AJCC Stage: ____] : AJCC Stage: [unfilled] [de-identified] : 58 M developed painless jaundice in 2017. An US performed in [facility] revealed a pancreatic mass. One year prior to this, he was diagnosed with DM. + 50 lb weight loss during this time. His father  of pancreatic cancer in his 70s. He traveled to [facility] and underwent an MRI Abd on 10/25/17 showed a 3.8 cm mass in the pancreatic head abutting the lateral wall of SMA and anterior wall of PV. PET/CT was negative for malignancy outside of the pancreas. Initial recommendation for was neoadjuvant chemotherapy. Patient sought a 2nd opinion from a surgeon and underwent an R1 Whipple with a rosalba-en-Y on 10/31/17. This was followed by 2 cycles of gemcitabine and capecitabine. Interim PET showed no disease, and he was then treated with 45 Gy radiation in 25 fractions to tumor bed and regional lymphatics with concurrent capecitabine. He followed this with 2 more cycles of gemcitabine and capecitabine. PET/CT after treatment in 2018 showed no recurrence of disease. [place] and was admitted to [facility] 10/2018 with small bowel obstruction. He is status post laparotomy and reduction of an internal hernia and revision of a strictured jejunal-jejunal anastomosis on 10/17/18. Pathology was benign. He has been on surveillance without evidence of recurrent disease.   19: CT A/P: negative for metastasis  20: CT CAP: neg for mets 20: CT CAP: GENO [de-identified] : well differentiated adenocarcinoma  [de-identified] : CA 19.9 <1  [FreeTextEntry1] : surveillance  [de-identified] : overall feels well. denies any c/o. good appetite. weight is stable. He is 5 yrs now since completing his adjuvant treatment.
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
## 2023-10-22 ENCOUNTER — EMERGENCY (EMERGENCY)
Facility: HOSPITAL | Age: 58
LOS: 1 days | Discharge: HOME | End: 2023-10-22
Attending: EMERGENCY MEDICINE | Admitting: EMERGENCY MEDICINE
Payer: MEDICAID

## 2023-10-22 VITALS
TEMPERATURE: 98 F | RESPIRATION RATE: 18 BRPM | HEART RATE: 73 BPM | DIASTOLIC BLOOD PRESSURE: 82 MMHG | SYSTOLIC BLOOD PRESSURE: 119 MMHG | OXYGEN SATURATION: 100 %

## 2023-10-22 PROCEDURE — 99284 EMERGENCY DEPT VISIT MOD MDM: CPT | Mod: 25

## 2023-10-22 NOTE — ED ADULT NURSE NOTE - NSFALLRISKINTERV_ED_ALL_ED

## 2023-10-22 NOTE — ED ADULT TRIAGE NOTE - CHIEF COMPLAINT QUOTE
c/o mechanical fall (tripped over step) hitting face on concrete. pt c/o nose, lower lip and b/l knee pain, arrives with bandages to nose. hx DM, pancreatic CA, HLD

## 2023-10-23 VITALS
SYSTOLIC BLOOD PRESSURE: 131 MMHG | DIASTOLIC BLOOD PRESSURE: 88 MMHG | TEMPERATURE: 98 F | OXYGEN SATURATION: 98 % | RESPIRATION RATE: 17 BRPM | HEART RATE: 67 BPM

## 2023-10-23 PROCEDURE — 70486 CT MAXILLOFACIAL W/O DYE: CPT | Mod: 26,MA

## 2023-10-23 PROCEDURE — 70450 CT HEAD/BRAIN W/O DYE: CPT | Mod: 26,MA

## 2023-10-23 PROCEDURE — 73110 X-RAY EXAM OF WRIST: CPT | Mod: 26,LT

## 2023-10-23 PROCEDURE — 72125 CT NECK SPINE W/O DYE: CPT | Mod: 26,MA

## 2023-10-23 RX ORDER — ACETAMINOPHEN 500 MG
975 TABLET ORAL ONCE
Refills: 0 | Status: COMPLETED | OUTPATIENT
Start: 2023-10-23 | End: 2023-10-23

## 2023-10-23 RX ADMIN — Medication 975 MILLIGRAM(S): at 00:37

## 2023-10-23 NOTE — ED PROVIDER NOTE - NSFOLLOWUPINSTRUCTIONS_ED_ALL_ED_FT
Today you were seen in the ED for medical evaluation after your fall.     A copy of your results are attached in this document below.     Please take Tylenol up to 650 mg every 6 hours as needed for pain and/or Motrin up to 600 mg every 8 hours as needed for pain. You may also apply ice to your nose if you are still having discomfort.     We recommend following up with your primary care provider within the next 7 days.     SEEK IMMEDIATE MEDICAL CARE IF YOU HAVE ANY OF THE FOLLOWING SYMPTOMS: severe chest pain, difficulty breathing, fainting, fevers that persist despite taking medications over the counter, vomiting blood, dark or bloody stools, inability to tolerate eating and drinking food by mouth

## 2023-10-23 NOTE — ED PROVIDER NOTE - CROS ED GI ALL NEG
Pt presents to ER with htn. Pt has noted BP systolic in the 445R. Pt did take extra BP medication, but did not note change in BP. No CP. Slight HA, \"tightness behind back of neck. \" no SOB. No n/v/d. No abd pain. Pt moving all extremities.  Skin warm and d
negative...

## 2023-10-23 NOTE — ED PROVIDER NOTE - PROGRESS NOTE DETAILS
Daughter (Angeli) [phone]  Wife [phone] Lyndon PGY1: results discussed with patient, negative CT head, neck and maxillofacial. incidental lung nodule finding discussed with patient, he states he knew of this and his oncologist is following. will contact outpatient PMD for follow-up.

## 2023-10-23 NOTE — ED PROVIDER NOTE - CLINICAL SUMMARY MEDICAL DECISION MAKING FREE TEXT BOX
57 y/o M with PMHx of pancreatic CA (in remission, not on active tx) and DM presents to ED for evaluation after mechanical fall tonight. Foot got caught in uneven bricks and he fell forward hitting his face and L wrist. Not on anticoagulation, no focal neuro deficit, well appearing on exam. 59 y/o M with PMHx of pancreatic CA (in remission, not on active tx) and DM presents to ED for evaluation after mechanical fall tonight. Foot got caught in uneven bricks and he fell forward hitting his face and L wrist. Not on anticoagulation, no focal neuro deficit, well appearing on exam. Will check x-ray of L wrist, CT head/maxillofacial. Anticipate d/c home.

## 2023-10-23 NOTE — ED PROVIDER NOTE - PHYSICAL EXAMINATION
Gen: well appearing, in no acute distress   Head: abrasion noted to bridge of nose with surrounding tenderness, TTP to anterior chin, abrasions intraorally to mucosa inferior to lower lip, no active bleeding   HEENT: normal conjunctiva, oral mucosa moist, vision grossly intact   Lung: no respiratory distress, speaking in full sentences, CTA b/l, no wheeze, crackles or rhonchi   CV: regular rate and rhythm, no murmurs  Abd: soft, non distended, non tender   MSK: no visible deformities, ambulating without difficulty   Neuro: No focal deficits, AAOx3  Skin: Warm, no rashes   Psych: normal affect

## 2023-10-23 NOTE — ED PROVIDER NOTE - OBJECTIVE STATEMENT
59 y/o M with PMHx of pancreatic CA (in remission, not on active tx) and DM presents to ED for evaluation after mechanical fall tonight. States he was leaving the [employer], went to take a step onto a curb, when his foot got caught in an uneven brick and he fell forward breaking the fall with his L wrist and hit his face on the pavement. Was able to get himself up off the ground, went home and cleaned the abrasions, but started to develop a headache. Denies vision changes, LOC, dizziness, syncope, chest pain prior to fall or currently.

## 2023-10-23 NOTE — ED PROVIDER NOTE - ATTENDING CONTRIBUTION TO CARE
HPI: 57 y/o M with PMHx of pancreatic CA 2017 (in remission, not on active tx) and NIDDM presents to ED for evaluation after mechanical fall tonight. States he was leaving [place] [employer], went to take a step onto a curb, when his foot got caught in an uneven brick and he fell forward breaking the fall with his L wrist and hit his face on the pavement. Was able to get himself up off the ground, went home and cleaned the abrasions, but started to develop a headache. Denies vision changes, LOC, dizziness, syncope, chest pain prior to fall or currently.    EXAM: Nasal bridge abrasion, face without palpable stepoff or tenderness to palpation, eyes EOMI/PERRL, chest wall stable, abd soft nontender, no midline C/T/L spine tenderness to palpation or no CVAT Bilaterally, moving all 4 extremities actively, CN 2-12 intact, no missing dentition, no oral lacerations.     MDM: 57 yo M with history of pancreatic CA and [facility]IDDM [facility] [employer] which is likely secondary to pt foot getting caught on the curb. No LOC and neuro intact, abrasion on nasal bridge but unlikely Fx. Pt has main complaint of right mandible pain on palpation but no stepoffs. tolerating PO and speaking full sentences. No other MSK deformities. Will obtain CT imaging, XR of wrist and provide pain meds and reassess.

## 2023-10-23 NOTE — ED PROVIDER NOTE - PATIENT PORTAL LINK FT
You can access the FollowMyHealth Patient Portal offered by [facility] by registering at the following website: [email or web address]. By joining [facility]’s FollowMyHealth portal, you will also be able to view your health information using other applications (apps) compatible with our system.

## 2024-02-06 ENCOUNTER — APPOINTMENT (OUTPATIENT)
Dept: OPHTHALMOLOGY | Facility: CLINIC | Age: 59
End: 2024-02-06

## 2024-02-26 NOTE — BRIEF OPERATIVE NOTE - POST-OP DX
Internal hernia  10/17/2018    Anthony Leary  Small bowel obstruction  10/17/2018  at prior surgical anastomosis  Anthony Leary musculoskeletal

## 2024-02-27 PROBLEM — E11.9 TYPE 2 DIABETES MELLITUS WITHOUT COMPLICATIONS: Chronic | Status: ACTIVE | Noted: 2023-10-23

## 2024-04-02 ENCOUNTER — NON-APPOINTMENT (OUTPATIENT)
Age: 59
End: 2024-04-02

## 2024-04-02 ENCOUNTER — APPOINTMENT (OUTPATIENT)
Dept: OPHTHALMOLOGY | Facility: CLINIC | Age: 59
End: 2024-04-02
Payer: MEDICAID

## 2024-04-02 PROCEDURE — 92014 COMPRE OPH EXAM EST PT 1/>: CPT | Mod: 25

## 2024-04-02 PROCEDURE — 92285 EXTERNAL OCULAR PHOTOGRAPHY: CPT

## 2024-09-18 ENCOUNTER — EMERGENCY (EMERGENCY)
Facility: HOSPITAL | Age: 59
LOS: 1 days | Discharge: HOME | End: 2024-09-18
Admitting: STUDENT IN AN ORGANIZED HEALTH CARE EDUCATION/TRAINING PROGRAM
Payer: MEDICAID

## 2024-09-18 ENCOUNTER — NON-APPOINTMENT (OUTPATIENT)
Age: 59
End: 2024-09-18

## 2024-09-18 ENCOUNTER — APPOINTMENT (OUTPATIENT)
Dept: OPHTHALMOLOGY | Facility: CLINIC | Age: 59
End: 2024-09-18
Payer: MEDICAID

## 2024-09-18 VITALS
RESPIRATION RATE: 18 BRPM | DIASTOLIC BLOOD PRESSURE: 68 MMHG | HEART RATE: 62 BPM | OXYGEN SATURATION: 99 % | SYSTOLIC BLOOD PRESSURE: 117 MMHG

## 2024-09-18 VITALS
OXYGEN SATURATION: 96 % | HEART RATE: 71 BPM | DIASTOLIC BLOOD PRESSURE: 78 MMHG | TEMPERATURE: 98 F | RESPIRATION RATE: 17 BRPM | SYSTOLIC BLOOD PRESSURE: 113 MMHG

## 2024-09-18 PROCEDURE — 72192 CT PELVIS W/O DYE: CPT | Mod: 26,MC

## 2024-09-18 PROCEDURE — 92083 EXTENDED VISUAL FIELD XM: CPT

## 2024-09-18 PROCEDURE — 99284 EMERGENCY DEPT VISIT MOD MDM: CPT

## 2024-09-18 PROCEDURE — 92020 GONIOSCOPY: CPT

## 2024-09-18 PROCEDURE — 72170 X-RAY EXAM OF PELVIS: CPT | Mod: 26

## 2024-09-18 PROCEDURE — 92012 INTRM OPH EXAM EST PATIENT: CPT | Mod: 25

## 2025-02-28 NOTE — ED PROVIDER NOTE - NO SIGNIFICANT PAST SURGICAL HISTORY
What Type Of Note Output Would You Prefer (Optional)?: Standard Output Hpi Title: Evaluation of Skin Lesions <<----- Click to add NO significant Past Surgical History

## 2025-03-02 ENCOUNTER — EMERGENCY (EMERGENCY)
Facility: HOSPITAL | Age: 60
LOS: 1 days | Discharge: HOME | End: 2025-03-02
Attending: STUDENT IN AN ORGANIZED HEALTH CARE EDUCATION/TRAINING PROGRAM | Admitting: STUDENT IN AN ORGANIZED HEALTH CARE EDUCATION/TRAINING PROGRAM
Payer: MEDICAID

## 2025-03-02 VITALS
RESPIRATION RATE: 18 BRPM | TEMPERATURE: 98 F | SYSTOLIC BLOOD PRESSURE: 125 MMHG | HEART RATE: 79 BPM | DIASTOLIC BLOOD PRESSURE: 82 MMHG | OXYGEN SATURATION: 98 %

## 2025-03-02 PROCEDURE — 99285 EMERGENCY DEPT VISIT HI MDM: CPT | Mod: 25

## 2025-03-02 NOTE — ED ADULT TRIAGE NOTE - CHIEF COMPLAINT QUOTE
Pt c/o rectal pain x 2 days worse with BM. Also endorses "weakness" to chest and problem breathing since driving back from [place] four days ago. Hx of DMT2, pancreatic cancer in remission.

## 2025-03-02 NOTE — ED ADULT TRIAGE NOTE - BP NONINVASIVE SYSTOLIC (MM HG)
Called pt's sister and lvmm. [employer] message sent in June advising that pt take Vit D 2000 daily supplement and have Vit D level rechecked after finishing the weekly dose. See that that message was not read. Asked that pt's sister inform if pt had continued taking daily vit d supplement or not. Advised that pt have lab drawn to see where Vit D level is as pt has been significantly low.   
125

## 2025-03-03 ENCOUNTER — OUTPATIENT (OUTPATIENT)
Dept: OUTPATIENT SERVICES | Facility: HOSPITAL | Age: 60
LOS: 1 days | Discharge: HOME | End: 2025-03-03

## 2025-03-03 DIAGNOSIS — C25.9 MALIGNANT NEOPLASM OF PANCREAS, UNSPECIFIED: ICD-10-CM

## 2025-03-03 LAB
ADD ON TEST-SPECIMEN IN LAB: SIGNIFICANT CHANGE UP
ALBUMIN SERPL ELPH-MCNC: 3.9 G/DL — SIGNIFICANT CHANGE UP (ref 3.3–5)
ALP SERPL-CCNC: 119 U/L — SIGNIFICANT CHANGE UP (ref 40–120)
ALT FLD-CCNC: 30 U/L — SIGNIFICANT CHANGE UP (ref 4–41)
ANION GAP SERPL CALC-SCNC: 16 MMOL/L — HIGH (ref 7–14)
APPEARANCE UR: CLEAR — SIGNIFICANT CHANGE UP
APTT BLD: 32.7 SEC — SIGNIFICANT CHANGE UP (ref 24.5–35.6)
AST SERPL-CCNC: 31 U/L — SIGNIFICANT CHANGE UP (ref 4–40)
BACTERIA # UR AUTO: NEGATIVE /HPF — SIGNIFICANT CHANGE UP
BASOPHILS # BLD AUTO: 0.05 K/UL — SIGNIFICANT CHANGE UP (ref 0–0.2)
BASOPHILS NFR BLD AUTO: 0.5 % — SIGNIFICANT CHANGE UP (ref 0–2)
BILIRUB SERPL-MCNC: 1.4 MG/DL — HIGH (ref 0.2–1.2)
BILIRUB UR-MCNC: NEGATIVE — SIGNIFICANT CHANGE UP
BLOOD GAS VENOUS COMPREHENSIVE RESULT: SIGNIFICANT CHANGE UP
BUN SERPL-MCNC: 9 MG/DL — SIGNIFICANT CHANGE UP (ref 7–23)
CALCIUM SERPL-MCNC: 9.3 MG/DL — SIGNIFICANT CHANGE UP (ref 8.4–10.5)
CAST: 0 /LPF — SIGNIFICANT CHANGE UP (ref 0–4)
CHLORIDE SERPL-SCNC: 101 MMOL/L — SIGNIFICANT CHANGE UP (ref 98–107)
CO2 SERPL-SCNC: 20 MMOL/L — LOW (ref 22–31)
COLOR SPEC: YELLOW — SIGNIFICANT CHANGE UP
CREAT SERPL-MCNC: 0.85 MG/DL — SIGNIFICANT CHANGE UP (ref 0.5–1.3)
DIFF PNL FLD: NEGATIVE — SIGNIFICANT CHANGE UP
EGFR: 101 ML/MIN/1.73M2 — SIGNIFICANT CHANGE UP
EOSINOPHIL # BLD AUTO: 0.22 K/UL — SIGNIFICANT CHANGE UP (ref 0–0.5)
EOSINOPHIL NFR BLD AUTO: 2.3 % — SIGNIFICANT CHANGE UP (ref 0–6)
GLUCOSE SERPL-MCNC: 268 MG/DL — HIGH (ref 70–99)
GLUCOSE UR QL: 250 MG/DL
HCT VFR BLD CALC: 39 % — SIGNIFICANT CHANGE UP (ref 39–50)
HGB BLD-MCNC: 12.9 G/DL — LOW (ref 13–17)
IANC: 5.8 K/UL — SIGNIFICANT CHANGE UP (ref 1.8–7.4)
IMM GRANULOCYTES NFR BLD AUTO: 0.3 % — SIGNIFICANT CHANGE UP (ref 0–0.9)
INR BLD: 1.06 RATIO — SIGNIFICANT CHANGE UP (ref 0.85–1.16)
KETONES UR-MCNC: NEGATIVE MG/DL — SIGNIFICANT CHANGE UP
LEUKOCYTE ESTERASE UR-ACNC: NEGATIVE — SIGNIFICANT CHANGE UP
LYMPHOCYTES # BLD AUTO: 2.23 K/UL — SIGNIFICANT CHANGE UP (ref 1–3.3)
LYMPHOCYTES # BLD AUTO: 23.4 % — SIGNIFICANT CHANGE UP (ref 13–44)
MCHC RBC-ENTMCNC: 27.3 PG — SIGNIFICANT CHANGE UP (ref 27–34)
MCHC RBC-ENTMCNC: 33.1 G/DL — SIGNIFICANT CHANGE UP (ref 32–36)
MCV RBC AUTO: 82.5 FL — SIGNIFICANT CHANGE UP (ref 80–100)
MONOCYTES # BLD AUTO: 1.22 K/UL — HIGH (ref 0–0.9)
MONOCYTES NFR BLD AUTO: 12.8 % — SIGNIFICANT CHANGE UP (ref 2–14)
NEUTROPHILS # BLD AUTO: 5.8 K/UL — SIGNIFICANT CHANGE UP (ref 1.8–7.4)
NEUTROPHILS NFR BLD AUTO: 60.7 % — SIGNIFICANT CHANGE UP (ref 43–77)
NITRITE UR-MCNC: NEGATIVE — SIGNIFICANT CHANGE UP
NRBC # BLD AUTO: 0 K/UL — SIGNIFICANT CHANGE UP (ref 0–0)
NRBC # FLD: 0 K/UL — SIGNIFICANT CHANGE UP (ref 0–0)
NRBC BLD AUTO-RTO: 0 /100 WBCS — SIGNIFICANT CHANGE UP (ref 0–0)
PH UR: 7 — SIGNIFICANT CHANGE UP (ref 5–8)
PLATELET # BLD AUTO: 199 K/UL — SIGNIFICANT CHANGE UP (ref 150–400)
POTASSIUM SERPL-MCNC: 4.2 MMOL/L — SIGNIFICANT CHANGE UP (ref 3.5–5.3)
POTASSIUM SERPL-SCNC: 4.2 MMOL/L — SIGNIFICANT CHANGE UP (ref 3.5–5.3)
PROT SERPL-MCNC: 7.2 G/DL — SIGNIFICANT CHANGE UP (ref 6–8.3)
PROT UR-MCNC: NEGATIVE MG/DL — SIGNIFICANT CHANGE UP
PROTHROM AB SERPL-ACNC: 12.6 SEC — SIGNIFICANT CHANGE UP (ref 9.9–13.4)
RBC # BLD: 4.73 M/UL — SIGNIFICANT CHANGE UP (ref 4.2–5.8)
RBC # FLD: 13.2 % — SIGNIFICANT CHANGE UP (ref 10.3–14.5)
RBC CASTS # UR COMP ASSIST: 0 /HPF — SIGNIFICANT CHANGE UP (ref 0–4)
SODIUM SERPL-SCNC: 137 MMOL/L — SIGNIFICANT CHANGE UP (ref 135–145)
SP GR SPEC: 1.03 — SIGNIFICANT CHANGE UP (ref 1–1.03)
SQUAMOUS # UR AUTO: 0 /HPF — SIGNIFICANT CHANGE UP (ref 0–5)
TROPONIN T, HIGH SENSITIVITY RESULT: 14 NG/L — SIGNIFICANT CHANGE UP
UROBILINOGEN FLD QL: 0.2 MG/DL — SIGNIFICANT CHANGE UP (ref 0.2–1)
WBC # BLD: 9.55 K/UL — SIGNIFICANT CHANGE UP (ref 3.8–10.5)
WBC # FLD AUTO: 9.55 K/UL — SIGNIFICANT CHANGE UP (ref 3.8–10.5)
WBC UR QL: 0 /HPF — SIGNIFICANT CHANGE UP (ref 0–5)

## 2025-03-03 PROCEDURE — 71275 CT ANGIOGRAPHY CHEST: CPT | Mod: 26

## 2025-03-03 PROCEDURE — 93010 ELECTROCARDIOGRAM REPORT: CPT

## 2025-03-03 PROCEDURE — 74177 CT ABD & PELVIS W/CONTRAST: CPT | Mod: 26

## 2025-03-03 RX ORDER — KETOROLAC TROMETHAMINE 30 MG/ML
15 INJECTION, SOLUTION INTRAMUSCULAR; INTRAVENOUS ONCE
Refills: 0 | Status: DISCONTINUED | OUTPATIENT
Start: 2025-03-03 | End: 2025-03-03

## 2025-03-03 RX ADMIN — Medication 1000 MILLILITER(S): at 01:28

## 2025-03-03 RX ADMIN — Medication 1000 MILLILITER(S): at 04:00

## 2025-03-03 RX ADMIN — Medication 1000 MILLILITER(S): at 02:20

## 2025-03-03 RX ADMIN — KETOROLAC TROMETHAMINE 15 MILLIGRAM(S): 30 INJECTION, SOLUTION INTRAMUSCULAR; INTRAVENOUS at 00:56

## 2025-03-03 NOTE — ED ADULT NURSE NOTE - PRIMARY CARE PROVIDER
carried in through ambulance doors by , informed patient took oxycodone and alcohol, shallow respirations in triage, minimally responded to tactile stimulation. MD called to bedside for eval
unk

## 2025-03-03 NOTE — ED PROVIDER NOTE - NSFOLLOWUPINSTRUCTIONS_ED_ALL_ED_FT
You were seen in the emergency department for rectal pain and shortness of breath. Your labs did not show a cause of your symptoms.  There was an incidental finding in your CT scan of your chest. a prior nodule on your right lung has grown we would like for you to follow up with the cancer center  Your other scans did not show a cause of your shortness of breath, palpitations, or rectal pain.    We would like for you to follow up with a gastroenterologists You were seen in the emergency department for rectal pain and shortness of breath. Your labs did not show a cause of your symptoms.  There was an incidental finding in your CT scan of your chest. a prior nodule on your right lung has grown we would like for you to follow up with the cancer center  Your other scans did not show a cause of your shortness of breath, palpitations, or rectal pain.    We would like for you to follow up with a gastroenterologists for your rectal pain    We would like for you to follow up with a urologist due to your enlarge prostate and urinary retention    Please follow up with your doctor within 1 week. Bring copies of your results with you (provided in your discharge paperwork).     You may take 500-1000 mg acetaminophen (tylenol) every 6 hours, as needed for pain.  You may take 600 mg ibuprofen every 8 hours, with food, as needed for pain.  You can take tylenol and ibuprofen at the same time.     PLEASE RETURN TO ED FOR NEW OR WORSENING SYMPTOMS (intractable nausea/vomiting, severe bleeding, fever over 100.4F, loss of movement of one or more limbs, seizure)

## 2025-03-03 NOTE — ED ADULT NURSE NOTE - CHIEF COMPLAINT QUOTE
Pt c/o rectal pain x 2 days worse with BM. Also endorses "weakness" to chest and problem breathing since driving back from [place] four days ago. Hx of DMT2, pancreatic cancer in remission.

## 2025-03-03 NOTE — ED PROVIDER NOTE - NSPTACCESSSVCSAPPTDETAILS_ED_ALL_ED_FT
Cancer care direct: growing lung mass  Gastroenterology: for rectal pain Cancer care direct: growing lung mass  Gastroenterology: for rectal pain  Urology: Prostatomegaly

## 2025-03-03 NOTE — ED PROVIDER NOTE - ATTENDING CONTRIBUTION TO CARE
Attending MD Manzo: I have seen and examined this patient and fully participated in the care of this patient as the teaching attending. I personally made/approved the management plan and take responsibility for the patient management.    Patient is a 58 y/o M w/ hx of pancreatitis ca s/o whipple in 2017, bowel obstruction in 2018, dm2 presenting to the ED with palpatations x2 days. Drive to [place] an dback w/o rests, symptoms began after that. No chest pain, sob. Also endorsingr rectal pain. having bms, non bloody, hill a "lump" at rectum. no hx o fhemmrhoids, fissures. Patient has been fasting for ramadan. not taking his AM w/ meal insulin. No hx of pe/dvt, no leg edema, no falls or injuries. no chemo/radiation x5 yrs. ROS otherwise neg. Exam w/ no fnd, lungs cta, no edema, no abd ttp. Will get CTA to eval for PE, CT a/p to eval for possible rectal abscess, labs, IVF and reassess. less likely dka,, fs high, will obtain vbg.       *The above represents an initial assessment/impression. Please refer to progress notes for potential changes in patient clinical course*

## 2025-03-03 NOTE — ED PROVIDER NOTE - PROGRESS NOTE DETAILS
Pt post void US showed elevated volume pt was able to produce more urine stating he urinated a significant amount.  Pt asked about anal penetration or sex with me and denied

## 2025-03-03 NOTE — ED PROVIDER NOTE - CLINICAL SUMMARY MEDICAL DECISION MAKING FREE TEXT BOX
Patient concerning for PE with cancer risk factors and immobility for a long period of time will obtain CTA chest will also obtain CT abdomen pelvis due to patient's history with pancreatic cancer and new severe rectal pain with no signs of fissures hemorrhoids internal/external will obtain basic labs cardiac enzymes.

## 2025-03-03 NOTE — ED ADULT NURSE NOTE - OBJECTIVE STATEMENT
pt received to rm 28  , a&ox4 , ambulatory , phx of DMT2, pancreatic cancer in remission. , p/w rectal pain x 2 days worsen with BM  . Pt breathing even and unlabored on room air. Denies fever, chills, cough, SOB, chest pain, palpitations, dizziness, nausea, vomiting, diarrhea, constipation, numbness, tingling. IV placed. Labs collected and sent.   . pt educated on fall precautions and confirms understanding via teach back method. Stretcher locked in lowest position with siderails up x2. Call bell and personal items within reach.

## 2025-03-03 NOTE — ED PROVIDER NOTE - PATIENT PORTAL LINK FT
You can access the FollowMyHealth Patient Portal offered by [facility] by registering at the following website: [email or web address]. By joining [facility]’s FollowMyHealth portal, you will also be able to view your health information using other applications (apps) compatible with our system.

## 2025-03-03 NOTE — ED PROVIDER NOTE - NSICDXPASTMEDICALHX_GEN_ALL_CORE_FT
Fasting glucose was 190, check A1c  Discussed low carb diet  PAST MEDICAL HISTORY:  DM (diabetes mellitus)     Pancreatic cancer

## 2025-03-03 NOTE — ED PROVIDER NOTE - PHYSICAL EXAMINATION
GENERAL: NAD, lying in bed comfortably  HEAD:  Atraumatic, normocephalic  HEART: Regular rate and rhythm, no murmurs, rubs, or gallops  LUNGS: Unlabored respirations.  Clear to auscultation bilaterally, no crackles, wheezing, or rhonchi  ABDOMEN: Soft, nontender, nondistended, +BS  Rectal Chaperone Carlos Alberto Mchugh MD: No external hemorrhoids or masses or internal hemorrhoids, fissures, or masses on palpation  EXTREMITIES: 2+ peripheral pulses bilaterally. No clubbing, cyanosis, or edema  NERVOUS SYSTEM:  A&Ox3, moving all extremities, no focal deficits   SKIN: No rashes or lesions

## 2025-03-03 NOTE — ED PROVIDER NOTE - NSFOLLOWUPCLINICS_GEN_ALL_ED_FT
[facility]  Cancer Care  [place]   Phone: [phone]  Fax:     [facility]  Gastroenterology  [address] [place] [address]  Phone: [phone]  Fax:     [facility]  Gastroenterology  [address] [place] [address], [facility] [address]  [place] [address]  Phone: [phone]  Fax:     [facility]  Gastroenterology  [address] [place] [address]  Phone: [phone]  Fax: [phone]    [facility]  [address] [place] [address], [place] [address]  Phone: [phone]  Fax:     [facility]  [address] [place] [address]  Phone: [phone]  Fax:     [facility] [place]  Urology  [address] [place] [address]  Phone: [phone]  Fax:     [facility]  Urology  [address]rd [place] [address]  Phone: [phone]  Fax:     [facility]  Urology  [address] [place] [address]  Phone: [phone]  Fax: [phone]

## 2025-03-03 NOTE — ED PROVIDER NOTE - OBJECTIVE STATEMENT
59-year-old male PMH pancreatic cancer chemoradiation in 2017, diabetes presenting with palpitations and rectal pain  Patient states palpitations began after he took a day long trip to [place] and [facility].  Patient denies chest pain and unreliably describes shortness of breath the patient is satting well at bedside.  Patient states he has palpitations when ambulating but has only been having palpitations since he has been fasting for Ramadan.  Patient also endorses rectal pain patient states rectal pain began after his drive to [place] and [facility] patient states rectal pain is worse when defecating.  Patient denies blood in feces small caliber stools

## 2025-03-03 NOTE — ED PROVIDER NOTE - NSFOLLOWUPCLINICSTOKEN_GEN_ALL_ED_FT
[id number]: || ||00\01||False;[id number]: || ||00\01||False;[id number]: || ||00\01||False;328410: || ||00\01||False; [id number]: || ||00\01||False;[id number]: || ||00\01||False;[id number]: || ||00\01||False;[id number]: || ||00\01||False;078089: || ||00\01||False;[id number]: || ||00\01||False;[id number]: || ||00\01||False;336827: || ||00\01||False;726490: || ||00\01||False;

## 2025-03-04 ENCOUNTER — APPOINTMENT (OUTPATIENT)
Dept: HEMATOLOGY ONCOLOGY | Facility: CLINIC | Age: 60
End: 2025-03-04
Payer: MEDICAID

## 2025-03-04 ENCOUNTER — NON-APPOINTMENT (OUTPATIENT)
Age: 60
End: 2025-03-04

## 2025-03-04 VITALS
RESPIRATION RATE: 17 BRPM | SYSTOLIC BLOOD PRESSURE: 94 MMHG | WEIGHT: 141.09 LBS | DIASTOLIC BLOOD PRESSURE: 60 MMHG | HEIGHT: 66.54 IN | HEART RATE: 89 BPM | TEMPERATURE: 97.7 F | BODY MASS INDEX: 22.41 KG/M2 | OXYGEN SATURATION: 97 %

## 2025-03-04 DIAGNOSIS — N40.0 BENIGN PROSTATIC HYPERPLASIA WITHOUT LOWER URINARY TRACT SYMPMS: ICD-10-CM

## 2025-03-04 DIAGNOSIS — C25.9 MALIGNANT NEOPLASM OF PANCREAS, UNSPECIFIED: ICD-10-CM

## 2025-03-04 DIAGNOSIS — K62.89 OTHER SPECIFIED DISEASES OF ANUS AND RECTUM: ICD-10-CM

## 2025-03-04 LAB
CULTURE RESULTS: SIGNIFICANT CHANGE UP
SPECIMEN SOURCE: SIGNIFICANT CHANGE UP

## 2025-03-04 PROCEDURE — G2211 COMPLEX E/M VISIT ADD ON: CPT | Mod: NC

## 2025-03-04 PROCEDURE — 99213 OFFICE O/P EST LOW 20 MIN: CPT

## 2025-03-05 LAB
CANCER AG19-9 SERPL-ACNC: <2 U/ML
PSA SERPL-MCNC: 2.71 NG/ML

## 2025-03-06 ENCOUNTER — APPOINTMENT (OUTPATIENT)
Dept: SURGERY | Facility: CLINIC | Age: 60
End: 2025-03-06
Payer: MEDICAID

## 2025-03-06 ENCOUNTER — APPOINTMENT (OUTPATIENT)
Dept: UROLOGY | Facility: CLINIC | Age: 60
End: 2025-03-06

## 2025-03-06 VITALS — SYSTOLIC BLOOD PRESSURE: 106 MMHG | DIASTOLIC BLOOD PRESSURE: 67 MMHG | HEART RATE: 97 BPM

## 2025-03-06 VITALS
WEIGHT: 141.09 LBS | BODY MASS INDEX: 22.68 KG/M2 | HEIGHT: 66 IN | DIASTOLIC BLOOD PRESSURE: 55 MMHG | RESPIRATION RATE: 17 BRPM | SYSTOLIC BLOOD PRESSURE: 90 MMHG | TEMPERATURE: 96.7 F | HEART RATE: 120 BPM

## 2025-03-06 DIAGNOSIS — K61.39 OTHER ISCHIORECTAL ABSCESS: ICD-10-CM

## 2025-03-06 PROCEDURE — 46040 I&D ISCHIORCT&/PERIRCT ABSC: CPT

## 2025-03-06 PROCEDURE — 99203 OFFICE O/P NEW LOW 30 MIN: CPT | Mod: 57

## 2025-03-06 RX ORDER — AMOXICILLIN AND CLAVULANATE POTASSIUM 875; 125 MG/1; MG/1
875-125 TABLET, COATED ORAL
Qty: 28 | Refills: 0 | Status: ACTIVE | COMMUNITY
Start: 2025-03-06 | End: 1900-01-01

## 2025-03-07 ENCOUNTER — INPATIENT (INPATIENT)
Facility: HOSPITAL | Age: 60
LOS: 12 days | Discharge: NURSING, REHAB OR LONG-TERM CARE | DRG: 728 | End: 2025-03-20
Attending: SURGERY | Admitting: SURGERY
Payer: MEDICAID

## 2025-03-07 VITALS
WEIGHT: 141.1 LBS | OXYGEN SATURATION: 98 % | HEART RATE: 117 BPM | HEIGHT: 66 IN | DIASTOLIC BLOOD PRESSURE: 59 MMHG | SYSTOLIC BLOOD PRESSURE: 97 MMHG | RESPIRATION RATE: 20 BRPM | TEMPERATURE: 99 F

## 2025-03-07 LAB
ALBUMIN SERPL ELPH-MCNC: 3 G/DL — LOW (ref 3.3–5)
ALT FLD-CCNC: 34 U/L — SIGNIFICANT CHANGE UP (ref 10–45)
ANION GAP SERPL CALC-SCNC: 12 MMOL/L — SIGNIFICANT CHANGE UP (ref 5–17)
ANISOCYTOSIS BLD QL: SLIGHT — SIGNIFICANT CHANGE UP
APTT BLD: 31.5 SEC — SIGNIFICANT CHANGE UP (ref 24.5–35.6)
AST SERPL-CCNC: 32 U/L — SIGNIFICANT CHANGE UP (ref 10–40)
BASOPHILS # BLD AUTO: 0 K/UL — SIGNIFICANT CHANGE UP (ref 0–0.2)
BASOPHILS NFR BLD AUTO: 0 % — SIGNIFICANT CHANGE UP (ref 0–2)
BILIRUB SERPL-MCNC: 0.8 MG/DL — SIGNIFICANT CHANGE UP (ref 0.2–1.2)
BUN SERPL-MCNC: 13 MG/DL — SIGNIFICANT CHANGE UP (ref 7–23)
CALCIUM SERPL-MCNC: 8.4 MG/DL — SIGNIFICANT CHANGE UP (ref 8.4–10.5)
CHLORIDE SERPL-SCNC: 100 MMOL/L — SIGNIFICANT CHANGE UP (ref 96–108)
CO2 SERPL-SCNC: 23 MMOL/L — SIGNIFICANT CHANGE UP (ref 22–31)
CREAT SERPL-MCNC: 0.98 MG/DL — SIGNIFICANT CHANGE UP (ref 0.5–1.3)
EGFR: 89 ML/MIN/1.73M2 — SIGNIFICANT CHANGE UP
EGFR: 89 ML/MIN/1.73M2 — SIGNIFICANT CHANGE UP
EOSINOPHIL # BLD AUTO: 0 K/UL — SIGNIFICANT CHANGE UP (ref 0–0.5)
EOSINOPHIL NFR BLD AUTO: 0 % — SIGNIFICANT CHANGE UP (ref 0–6)
GLUCOSE SERPL-MCNC: 212 MG/DL — HIGH (ref 70–99)
HCT VFR BLD CALC: 34.4 % — LOW (ref 39–50)
HGB BLD-MCNC: 11.6 G/DL — LOW (ref 13–17)
INR BLD: 1.37 RATIO — HIGH (ref 0.85–1.16)
LYMPHOCYTES # BLD AUTO: 0.4 K/UL — LOW (ref 1–3.3)
LYMPHOCYTES # BLD AUTO: 3.5 % — LOW (ref 13–44)
MAGNESIUM SERPL-MCNC: 2.1 MG/DL — SIGNIFICANT CHANGE UP (ref 1.6–2.6)
MANUAL SMEAR VERIFICATION: SIGNIFICANT CHANGE UP
MCHC RBC-ENTMCNC: 27 PG — SIGNIFICANT CHANGE UP (ref 27–34)
MCHC RBC-ENTMCNC: 33.7 G/DL — SIGNIFICANT CHANGE UP (ref 32–36)
MCV RBC AUTO: 80 FL — SIGNIFICANT CHANGE UP (ref 80–100)
MONOCYTES # BLD AUTO: 1.01 K/UL — HIGH (ref 0–0.9)
MONOCYTES NFR BLD AUTO: 8.8 % — SIGNIFICANT CHANGE UP (ref 2–14)
NEUTROPHILS # BLD AUTO: 10.09 K/UL — HIGH (ref 1.8–7.4)
NEUTROPHILS NFR BLD AUTO: 86.8 % — HIGH (ref 43–77)
NEUTS BAND # BLD: 0.9 % — SIGNIFICANT CHANGE UP (ref 0–8)
NEUTS BAND NFR BLD: 0.9 % — SIGNIFICANT CHANGE UP (ref 0–8)
OVALOCYTES BLD QL SMEAR: SLIGHT — SIGNIFICANT CHANGE UP
PHOSPHATE SERPL-MCNC: 1.2 MG/DL — LOW (ref 2.5–4.5)
PLAT MORPH BLD: NORMAL — SIGNIFICANT CHANGE UP
PLATELET # BLD AUTO: 180 K/UL — SIGNIFICANT CHANGE UP (ref 150–400)
POIKILOCYTOSIS BLD QL AUTO: SLIGHT — SIGNIFICANT CHANGE UP
POTASSIUM SERPL-MCNC: 3.5 MMOL/L — SIGNIFICANT CHANGE UP (ref 3.5–5.3)
POTASSIUM SERPL-SCNC: 3.5 MMOL/L — SIGNIFICANT CHANGE UP (ref 3.5–5.3)
PROCALCITONIN SERPL-MCNC: 0.39 NG/ML — HIGH (ref 0.02–0.1)
PROT SERPL-MCNC: 6.3 G/DL — SIGNIFICANT CHANGE UP (ref 6–8.3)
PROTHROM AB SERPL-ACNC: 15.7 SEC — HIGH (ref 9.9–13.4)
RBC # BLD: 4.3 M/UL — SIGNIFICANT CHANGE UP (ref 4.2–5.8)
RBC # FLD: 13.9 % — SIGNIFICANT CHANGE UP (ref 10.3–14.5)
RBC BLD AUTO: SIGNIFICANT CHANGE UP
WBC # BLD: 11.5 K/UL — HIGH (ref 3.8–10.5)
WBC # FLD AUTO: 11.5 K/UL — HIGH (ref 3.8–10.5)

## 2025-03-07 PROCEDURE — 74177 CT ABD & PELVIS W/CONTRAST: CPT | Mod: 26

## 2025-03-07 PROCEDURE — 99291 CRITICAL CARE FIRST HOUR: CPT

## 2025-03-07 RX ORDER — SODIUM CHLORIDE 9 G/1000ML
1000 INJECTION, SOLUTION INTRAVENOUS ONCE
Refills: 0 | Status: COMPLETED | OUTPATIENT
Start: 2025-03-07 | End: 2025-03-07

## 2025-03-07 RX ORDER — ACETAMINOPHEN 500 MG/5ML
1000 LIQUID (ML) ORAL ONCE
Refills: 0 | Status: COMPLETED | OUTPATIENT
Start: 2025-03-07 | End: 2025-03-07

## 2025-03-07 RX ORDER — PIPERACILLIN-TAZO-DEXTROSE,ISO 3.375G/5
3.38 IV SOLUTION, PIGGYBACK PREMIX FROZEN(ML) INTRAVENOUS ONCE
Refills: 0 | Status: COMPLETED | OUTPATIENT
Start: 2025-03-07 | End: 2025-03-07

## 2025-03-07 RX ORDER — FENTANYL CITRATE-0.9 % NACL/PF 100MCG/2ML
50 SYRINGE (ML) INTRAVENOUS ONCE
Refills: 0 | Status: DISCONTINUED | OUTPATIENT
Start: 2025-03-07 | End: 2025-03-07

## 2025-03-07 RX ORDER — CLINDAMYCIN PHOSPHATE 150 MG/ML
600 VIAL (ML) INJECTION ONCE
Refills: 0 | Status: COMPLETED | OUTPATIENT
Start: 2025-03-07 | End: 2025-03-08

## 2025-03-07 RX ORDER — VANCOMYCIN HCL IN 5 % DEXTROSE 1.5G/250ML
1000 PLASTIC BAG, INJECTION (ML) INTRAVENOUS ONCE
Refills: 0 | Status: COMPLETED | OUTPATIENT
Start: 2025-03-07 | End: 2025-03-07

## 2025-03-07 RX ADMIN — SODIUM CHLORIDE 2000 MILLILITER(S): 9 INJECTION, SOLUTION INTRAVENOUS at 20:43

## 2025-03-07 RX ADMIN — Medication 1000 MILLIGRAM(S): at 21:00

## 2025-03-07 RX ADMIN — Medication 50 MICROGRAM(S): at 20:43

## 2025-03-07 RX ADMIN — Medication 200 GRAM(S): at 22:58

## 2025-03-07 RX ADMIN — Medication 400 MILLIGRAM(S): at 20:43

## 2025-03-07 RX ADMIN — Medication 250 MILLIGRAM(S): at 23:36

## 2025-03-07 NOTE — CONSULT NOTE ADULT - SUBJECTIVE AND OBJECTIVE BOX
General Surgery Consult  Consulting surgical team: ACS   Consulting attending: Bimal  Patient seen and examined: 03-07-25 @ 23:58    HPI:  59M PMH DM, pancreatic cancer, s/p whipple in [facility], presents with perirectal pain. Patient states he had perirectal pain, and an abscess was drained at an outpt office and he was sent home on PO antibiotics. The patient noticed worsening scrotal swelling, pain so he presented to the ED. 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     PAST MEDICAL HISTORY:  Pancreatic cancer    DM (diabetes mellitus)        PAST SURGICAL HISTORY:  No significant past surgical history        ALLERGIES:  No Known Allergies      MEDICATIONS:  clindamycin IVPB 600 milliGRAM(s) IV Intermittent once      VITALS & I/Os:  Vital Signs Last 24 Hrs  T(C): 36.7 (07 Mar 2025 20:40), Max: 37 (07 Mar 2025 19:36)  T(F): 98.1 (07 Mar 2025 20:40), Max: 98.6 (07 Mar 2025 19:36)  HR: 90 (07 Mar 2025 21:10) (90 - 117)  BP: 104/59 (07 Mar 2025 21:10) (97/59 - 110/64)  BP(mean): 77 (07 Mar 2025 21:10) (77 - 84)  RR: 20 (07 Mar 2025 21:10) (20 - 20)  SpO2: 99% (07 Mar 2025 21:10) (98% - 99%)    Parameters below as of 07 Mar 2025 21:10  Patient On (Oxygen Delivery Method): room air        I&O's Summary      PHYSICAL EXAM:  General: No acute distress  Respiratory: Nonlabored respirations   Cardiovascular: pulse present  Abdominal: Soft, nondistended, nontender  : I&D site c/d/i without active purulent drainage, R perineum and scrotum edematous, warm to touch, and tender  Extremities: Appears warm and well perfused    LABS:                        11.6   11.50 )-----------( 180      ( 07 Mar 2025 20:55 )             34.4     03-07    135  |  100  |  13  ----------------------------<  212[H]  3.5   |  23  |  0.98    Ca    8.4      07 Mar 2025 20:55  Phos  1.2     03-07  Mg     2.1     03-07    TPro  6.3  /  Alb  3.0[L]  /  TBili  0.8  /  DBili  x   /  AST  32  /  ALT  34  /  AlkPhos  124[H]  03-07    Lactate:    PT/INR - ( 07 Mar 2025 20:55 )   PT: 15.7 sec;   INR: 1.37 ratio         PTT - ( 07 Mar 2025 20:55 )  PTT:31.5 sec          Urinalysis Basic - ( 07 Mar 2025 20:55 )    Color: x / Appearance: x / SG: x / pH: x  Gluc: 212 mg/dL / Ketone: x  / Bili: x / Urobili: x   Blood: x / Protein: x / Nitrite: x   Leuk Esterase: x / RBC: x / WBC x   Sq Epi: x / Non Sq Epi: x / Bacteria: x        IMAGING:  < from: CT Abdomen and Pelvis w/ IV Cont (03.07.25 @ 21:14) >  PROCEDURE:  CT of the Abdomen and Pelvis was performed.  Sagittal and coronal reformats were performed.    FINDINGS:  LOWER CHEST: Minimal linear atelectasis. Borderline heart size. Calcified   atherosclerosis of the coronary arteries.    LIVER: Grossly stable indeterminate hyperenhancing lesion in the   posterior subcapsular margin of segment 7 measures up to 1.1 cm and may   be further assessed and characterized with outpatient contrast-enhanced   MRI liver mass protocol.  BILE DUCTS: Mild central pneumobilia, with a left predominance.  GALLBLADDER: Cholecystectomy.  SPLEEN: Within normal limits.  PANCREAS: Atrophic and infiltrated by fat. There are small gas   collections scattered throughout the pancreatic duct, which is not   significantly dilated.  ADRENALS: Within normal limits.  KIDNEYS/URETERS: Within normal limits.    BLADDER: Moderate diffuse wall thickening and mild mucosal   hyperenhancement are findings that need further assessment and   characterization with urinalysis and urine cultures, to exclude an   underlying cystitis.  REPRODUCTIVE ORGANS: Gas is tracing down the space between the prostate   gland and the medial surface of the left obturator internus. The prostate   gland is mildly enlarged. There is also subcutaneous gas along the right   side of the penile shaft, and extending to the adjacent perineal region,   perianal region, and scrotal sac. Complex fluid is seen surrounding both   testicles. Inflammatory changes are seen throughout all these described   areas in the pelvis.    BOWEL: No bowel obstruction. Appendix is not visualized. No evidence of   inflammation in the pericecal region. Partial bowel resection surgical   changes, without CT evident complications.  PERITONEUM/RETROPERITONEUM: Within normal limits.  VESSELS: Atherosclerotic changes.  LYMPH NODES: No lymphadenopathy.  ABDOMINAL WALL: Postsurgical changes.  BONES: Degenerative changes.    IMPRESSION:  Inflammation andsubcutaneous emphysema involving the scrotal sac,   perineal region, perianal region, right penile shaft, and space between   the prostate gland and the left obturator internus. Complex fluid is seen   within the scrotum as well. These findings are suspicious for an ongoing   gas producing infectious process, although no definite organized abscess   is visualized. Correlate with physical examination.    < end of copied text >

## 2025-03-07 NOTE — ED ADULT NURSE NOTE - TEMPLATE LIST FOR HEAD TO TOE ASSESSMENT
It's possible her symptoms could be from a viral infection.  Muscle twitch or spasm as well as headaches could be s/e from prednisone.  
General
dialysis catheter

## 2025-03-07 NOTE — CONSULT NOTE ADULT - ASSESSMENT
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 Wallis    Trauma/ACS Team  [phone]

## 2025-03-07 NOTE — ED ADULT NURSE NOTE - OBJECTIVE STATEMENT
58 y/o M , AXOX4, with a PMH of HTN, DM, presents to the ED reporting 10/10 rectal pain. Pt had n anal fistula placed 3/6 at [facility]. Pt reports taking ibuprofen at 4pm today; pt had some relief but pain returned. Pt with inflammation at the site. Pt denies chest pain, dyspnea, N/V, dizziness. Pt ambulatory independently. Pt breathing unlabored on room air, speaking in complete sentences, strong and equal strength in all extremities, sensations intact, abd soft non tender non distended, no edema noted. Safety and comfort measures maintained.

## 2025-03-07 NOTE — ED PROVIDER NOTE - ATTENDING CONTRIBUTION TO CARE
------------ATTENDING NOTE------------  pt w/ family c/o increasing pain, redness, swelling, tenderness, subjective fevers, complicated as I&D w/ copious pus at outpt Urology yesterday, on Augmentin, imaging c/w Pema's gangrene, ED sign out 2300 pending full labs /imaging /Surgery / Urology consults for tx / admission.  - Bob Mg MD  --------------------------------------------------------------- ------------ATTENDING NOTE------------  pt w/ family c/o increasing pain, redness, swelling, tenderness, subjective fevers, complicated as I&D w/ copious pus at outpt Urology yesterday, on Augmentin, well hydrated/HD stable and judicious IVF (<30ml kg), empiric antibiotics,  imaging c/w Pema's gangrene, ED sign out 2300 pending full labs /imaging /Surgery / Urology consults for tx / admission.  - Bob Mg MD  ---------------------------------------------------------------

## 2025-03-07 NOTE — ED ADULT TRIAGE NOTE - TEMPERATURE IN FAHRENHEIT (DEGREES F)
[FreeTextEntry1] : HCM:\par -06/20\par -HIV screening: negative 2020\par \par Gyn: Dr Cui\par Mammo: 2019/ \par Colonoscopy: 2019./ F/up with Dr Ritter\par \par HTN:\par -on Cardizem.\par -F/up with cardiology, Dr medley.\par \par Dyslipidemia:\par -On Atorvastatin\par \par Asthma:\par -On Breo.\par -F/up with Pulmonology Dr ariza\par \par GERD:\par -On Pantoprazole.\par -GI: Dr Ritter\par \par Chronic lumbalgia/ Cervicalgia? Knee OA;RT elbow tendinitis\par -Seen by Orthopedic.\par -On Ibuprofen prn and methocarbamol\par -Seen by Chiropractor\par \par Screening for depression>  depression with anxiety\par -Reassume Cymbalta.\par -Reassume Vistaril prn.\par -F/up at [facility]. > psychiatry and mental counseling referral.\par \par Ganglion Cyst in Left dorsal foot:\par -Resolved\par \par F/up for annual physical or prn.\par Blood drawn today\par 
98.6

## 2025-03-07 NOTE — ED ADULT TRIAGE NOTE - AS TEMP SITE
Patient resides at [facility]. Patient is a former smoker quit >45 years ago. Patient does not drink or use any illicit substances. oral

## 2025-03-08 DIAGNOSIS — N49.3 FOURNIER GANGRENE: ICD-10-CM

## 2025-03-08 LAB
A1C WITH ESTIMATED AVERAGE GLUCOSE RESULT: 9.3 % — HIGH (ref 4–5.6)
ADD ON TEST-SPECIMEN IN LAB: SIGNIFICANT CHANGE UP
ANION GAP SERPL CALC-SCNC: 14 MMOL/L — SIGNIFICANT CHANGE UP (ref 5–17)
ANION GAP SERPL CALC-SCNC: 17 MMOL/L — SIGNIFICANT CHANGE UP (ref 5–17)
APPEARANCE UR: CLEAR — SIGNIFICANT CHANGE UP
BACTERIA # UR AUTO: NEGATIVE /HPF — SIGNIFICANT CHANGE UP
BILIRUB UR-MCNC: NEGATIVE — SIGNIFICANT CHANGE UP
BLD GP AB SCN SERPL QL: NEGATIVE — SIGNIFICANT CHANGE UP
BUN SERPL-MCNC: 10 MG/DL — SIGNIFICANT CHANGE UP (ref 7–23)
BUN SERPL-MCNC: 10 MG/DL — SIGNIFICANT CHANGE UP (ref 7–23)
CALCIUM SERPL-MCNC: 7.6 MG/DL — LOW (ref 8.4–10.5)
CALCIUM SERPL-MCNC: 8.5 MG/DL — SIGNIFICANT CHANGE UP (ref 8.4–10.5)
CAST: 0 /LPF — SIGNIFICANT CHANGE UP (ref 0–4)
CHLORIDE SERPL-SCNC: 101 MMOL/L — SIGNIFICANT CHANGE UP (ref 96–108)
CHLORIDE SERPL-SCNC: 102 MMOL/L — SIGNIFICANT CHANGE UP (ref 96–108)
CO2 SERPL-SCNC: 23 MMOL/L — SIGNIFICANT CHANGE UP (ref 22–31)
CO2 SERPL-SCNC: 23 MMOL/L — SIGNIFICANT CHANGE UP (ref 22–31)
COLOR SPEC: YELLOW — SIGNIFICANT CHANGE UP
CREAT SERPL-MCNC: 0.88 MG/DL — SIGNIFICANT CHANGE UP (ref 0.5–1.3)
CREAT SERPL-MCNC: 0.95 MG/DL — SIGNIFICANT CHANGE UP (ref 0.5–1.3)
CULTURE RESULTS: NO GROWTH — SIGNIFICANT CHANGE UP
DIFF PNL FLD: NEGATIVE — SIGNIFICANT CHANGE UP
EGFR: 100 ML/MIN/1.73M2 — SIGNIFICANT CHANGE UP
EGFR: 100 ML/MIN/1.73M2 — SIGNIFICANT CHANGE UP
EGFR: 93 ML/MIN/1.73M2 — SIGNIFICANT CHANGE UP
EGFR: 93 ML/MIN/1.73M2 — SIGNIFICANT CHANGE UP
ESTIMATED AVERAGE GLUCOSE: 220 MG/DL — HIGH (ref 68–114)
GLUCOSE BLDC GLUCOMTR-MCNC: 150 MG/DL — HIGH (ref 70–99)
GLUCOSE BLDC GLUCOMTR-MCNC: 203 MG/DL — HIGH (ref 70–99)
GLUCOSE BLDC GLUCOMTR-MCNC: 222 MG/DL — HIGH (ref 70–99)
GLUCOSE BLDC GLUCOMTR-MCNC: 246 MG/DL — HIGH (ref 70–99)
GLUCOSE SERPL-MCNC: 181 MG/DL — HIGH (ref 70–99)
GLUCOSE UR QL: 100 MG/DL
GRAM STN FLD: ABNORMAL
HCT VFR BLD CALC: 35.9 % — LOW (ref 39–50)
HGB BLD-MCNC: 12.1 G/DL — LOW (ref 13–17)
KETONES UR-MCNC: NEGATIVE MG/DL — SIGNIFICANT CHANGE UP
LEUKOCYTE ESTERASE UR-ACNC: NEGATIVE — SIGNIFICANT CHANGE UP
MAGNESIUM SERPL-MCNC: 1.9 MG/DL — SIGNIFICANT CHANGE UP (ref 1.6–2.6)
MAGNESIUM SERPL-MCNC: 2 MG/DL — SIGNIFICANT CHANGE UP (ref 1.6–2.6)
MCHC RBC-ENTMCNC: 27.1 PG — SIGNIFICANT CHANGE UP (ref 27–34)
MRSA PCR RESULT.: SIGNIFICANT CHANGE UP
NITRITE UR-MCNC: NEGATIVE — SIGNIFICANT CHANGE UP
NRBC BLD AUTO-RTO: 0 /100 WBCS — SIGNIFICANT CHANGE UP (ref 0–0)
PH UR: 6.5 — SIGNIFICANT CHANGE UP (ref 5–8)
PHOSPHATE SERPL-MCNC: 1.8 MG/DL — LOW (ref 2.5–4.5)
PHOSPHATE SERPL-MCNC: 4.3 MG/DL — SIGNIFICANT CHANGE UP (ref 2.5–4.5)
PLATELET # BLD AUTO: 159 K/UL — SIGNIFICANT CHANGE UP (ref 150–400)
POTASSIUM SERPL-MCNC: 3.3 MMOL/L — LOW (ref 3.5–5.3)
POTASSIUM SERPL-MCNC: 3.9 MMOL/L — SIGNIFICANT CHANGE UP (ref 3.5–5.3)
POTASSIUM SERPL-SCNC: 3.3 MMOL/L — LOW (ref 3.5–5.3)
POTASSIUM SERPL-SCNC: 3.9 MMOL/L — SIGNIFICANT CHANGE UP (ref 3.5–5.3)
PROT UR-MCNC: NEGATIVE MG/DL — SIGNIFICANT CHANGE UP
RBC # BLD: 4.46 M/UL — SIGNIFICANT CHANGE UP (ref 4.2–5.8)
RBC # FLD: 13.7 % — SIGNIFICANT CHANGE UP (ref 10.3–14.5)
RBC CASTS # UR COMP ASSIST: 0 /HPF — SIGNIFICANT CHANGE UP (ref 0–4)
REVIEW: SIGNIFICANT CHANGE UP
RH IG SCN BLD-IMP: POSITIVE — SIGNIFICANT CHANGE UP
S AUREUS DNA NOSE QL NAA+PROBE: DETECTED
SODIUM SERPL-SCNC: 141 MMOL/L — SIGNIFICANT CHANGE UP (ref 135–145)
SP GR SPEC: 1.02 — SIGNIFICANT CHANGE UP (ref 1–1.03)
SPECIMEN SOURCE: SIGNIFICANT CHANGE UP
SPECIMEN SOURCE: SIGNIFICANT CHANGE UP
SQUAMOUS # UR AUTO: 0 /HPF — SIGNIFICANT CHANGE UP (ref 0–5)
UROBILINOGEN FLD QL: 0.2 MG/DL — SIGNIFICANT CHANGE UP (ref 0.2–1)
WBC # BLD: 10.72 K/UL — HIGH (ref 3.8–10.5)
WBC # FLD AUTO: 10.72 K/UL — HIGH (ref 3.8–10.5)
WBC UR QL: 0 /HPF — SIGNIFICANT CHANGE UP (ref 0–5)

## 2025-03-08 PROCEDURE — 55110 EXPLORE SCROTUM: CPT

## 2025-03-08 PROCEDURE — 11004 DBRDMT SKIN XTRNL GENT&PER: CPT | Mod: GC

## 2025-03-08 PROCEDURE — 99291 CRITICAL CARE FIRST HOUR: CPT | Mod: 24

## 2025-03-08 RX ORDER — LIPASE/PROTEASE/AMYLASE 10K-37.5K
1 CAPSULE,DELAYED RELEASE (ENTERIC COATED) ORAL
Refills: 0 | Status: DISCONTINUED | OUTPATIENT
Start: 2025-03-08 | End: 2025-03-09

## 2025-03-08 RX ORDER — SODIUM CHLORIDE 9 G/1000ML
500 INJECTION, SOLUTION INTRAVENOUS ONCE
Refills: 0 | Status: COMPLETED | OUTPATIENT
Start: 2025-03-08 | End: 2025-03-08

## 2025-03-08 RX ORDER — PIPERACILLIN-TAZO-DEXTROSE,ISO 3.375G/5
3.38 IV SOLUTION, PIGGYBACK PREMIX FROZEN(ML) INTRAVENOUS EVERY 8 HOURS
Refills: 0 | Status: DISCONTINUED | OUTPATIENT
Start: 2025-03-08 | End: 2025-03-09

## 2025-03-08 RX ORDER — CLINDAMYCIN PHOSPHATE 150 MG/ML
600 VIAL (ML) INJECTION EVERY 8 HOURS
Refills: 0 | Status: DISCONTINUED | OUTPATIENT
Start: 2025-03-08 | End: 2025-03-09

## 2025-03-08 RX ORDER — VANCOMYCIN HCL IN 5 % DEXTROSE 1.5G/250ML
1000 PLASTIC BAG, INJECTION (ML) INTRAVENOUS EVERY 12 HOURS
Refills: 0 | Status: DISCONTINUED | OUTPATIENT
Start: 2025-03-08 | End: 2025-03-08

## 2025-03-08 RX ORDER — POTASSIUM PHOSPHATE, MONOBASIC POTASSIUM PHOSPHATE, DIBASIC INJECTION, 236; 224 MG/ML; MG/ML
30 SOLUTION, CONCENTRATE INTRAVENOUS ONCE
Refills: 0 | Status: COMPLETED | OUTPATIENT
Start: 2025-03-08 | End: 2025-03-08

## 2025-03-08 RX ORDER — INSULIN GLARGINE-YFGN 100 [IU]/ML
26 INJECTION, SOLUTION SUBCUTANEOUS AT BEDTIME
Refills: 0 | Status: DISCONTINUED | OUTPATIENT
Start: 2025-03-08 | End: 2025-03-09

## 2025-03-08 RX ORDER — INSULIN LISPRO 100 U/ML
INJECTION, SOLUTION INTRAVENOUS; SUBCUTANEOUS
Refills: 0 | Status: DISCONTINUED | OUTPATIENT
Start: 2025-03-08 | End: 2025-03-09

## 2025-03-08 RX ORDER — INSULIN LISPRO 100 U/ML
INJECTION, SOLUTION INTRAVENOUS; SUBCUTANEOUS AT BEDTIME
Refills: 0 | Status: DISCONTINUED | OUTPATIENT
Start: 2025-03-08 | End: 2025-03-09

## 2025-03-08 RX ORDER — POLYETHYLENE GLYCOL 3350 17 G/17G
17 POWDER, FOR SOLUTION ORAL DAILY
Refills: 0 | Status: DISCONTINUED | OUTPATIENT
Start: 2025-03-08 | End: 2025-03-09

## 2025-03-08 RX ORDER — ENOXAPARIN SODIUM 100 MG/ML
40 INJECTION SUBCUTANEOUS EVERY 24 HOURS
Refills: 0 | Status: DISCONTINUED | OUTPATIENT
Start: 2025-03-08 | End: 2025-03-08

## 2025-03-08 RX ORDER — ENOXAPARIN SODIUM 100 MG/ML
40 INJECTION SUBCUTANEOUS EVERY 24 HOURS
Refills: 0 | Status: DISCONTINUED | OUTPATIENT
Start: 2025-03-08 | End: 2025-03-09

## 2025-03-08 RX ORDER — INSULIN GLARGINE-YFGN 100 [IU]/ML
26 INJECTION, SOLUTION SUBCUTANEOUS
Refills: 0 | DISCHARGE

## 2025-03-08 RX ORDER — ACETAMINOPHEN 500 MG/5ML
1000 LIQUID (ML) ORAL EVERY 6 HOURS
Refills: 0 | Status: DISCONTINUED | OUTPATIENT
Start: 2025-03-08 | End: 2025-03-09

## 2025-03-08 RX ORDER — SENNA 187 MG
2 TABLET ORAL AT BEDTIME
Refills: 0 | Status: DISCONTINUED | OUTPATIENT
Start: 2025-03-08 | End: 2025-03-09

## 2025-03-08 RX ORDER — OXYCODONE HYDROCHLORIDE 30 MG/1
2.5 TABLET ORAL EVERY 4 HOURS
Refills: 0 | Status: DISCONTINUED | OUTPATIENT
Start: 2025-03-08 | End: 2025-03-09

## 2025-03-08 RX ORDER — SODIUM PHOSPHATE,DIBASIC DIHYD
15 POWDER (GRAM) MISCELLANEOUS ONCE
Refills: 0 | Status: COMPLETED | OUTPATIENT
Start: 2025-03-08 | End: 2025-03-08

## 2025-03-08 RX ORDER — MAGNESIUM SULFATE 500 MG/ML
2 SYRINGE (ML) INJECTION ONCE
Refills: 0 | Status: COMPLETED | OUTPATIENT
Start: 2025-03-08 | End: 2025-03-08

## 2025-03-08 RX ORDER — SIMETHICONE 80 MG
80 TABLET,CHEWABLE ORAL ONCE
Refills: 0 | Status: COMPLETED | OUTPATIENT
Start: 2025-03-08 | End: 2025-03-08

## 2025-03-08 RX ORDER — PIPERACILLIN-TAZO-DEXTROSE,ISO 3.375G/5
3.38 IV SOLUTION, PIGGYBACK PREMIX FROZEN(ML) INTRAVENOUS EVERY 8 HOURS
Refills: 0 | Status: DISCONTINUED | OUTPATIENT
Start: 2025-03-08 | End: 2025-03-08

## 2025-03-08 RX ORDER — SODIUM CHLORIDE 9 G/1000ML
1000 INJECTION, SOLUTION INTRAVENOUS
Refills: 0 | Status: DISCONTINUED | OUTPATIENT
Start: 2025-03-08 | End: 2025-03-08

## 2025-03-08 RX ORDER — LORAZEPAM 4 MG/ML
2 VIAL (ML) INJECTION ONCE
Refills: 0 | Status: DISCONTINUED | OUTPATIENT
Start: 2025-03-08 | End: 2025-03-08

## 2025-03-08 RX ORDER — VANCOMYCIN HCL IN 5 % DEXTROSE 1.5G/250ML
1000 PLASTIC BAG, INJECTION (ML) INTRAVENOUS EVERY 12 HOURS
Refills: 0 | Status: DISCONTINUED | OUTPATIENT
Start: 2025-03-08 | End: 2025-03-09

## 2025-03-08 RX ORDER — NOREPINEPHRINE BITARTRATE 8 MG
0.05 SOLUTION INTRAVENOUS
Qty: 8 | Refills: 0 | Status: DISCONTINUED | OUTPATIENT
Start: 2025-03-08 | End: 2025-03-09

## 2025-03-08 RX ORDER — LORAZEPAM 4 MG/ML
1 VIAL (ML) INJECTION
Refills: 0 | Status: DISCONTINUED | OUTPATIENT
Start: 2025-03-08 | End: 2025-03-08

## 2025-03-08 RX ADMIN — Medication 25 GRAM(S): at 22:35

## 2025-03-08 RX ADMIN — Medication 250 MILLIGRAM(S): at 23:39

## 2025-03-08 RX ADMIN — Medication 20 MILLIEQUIVALENT(S): at 08:15

## 2025-03-08 RX ADMIN — Medication 1 APPLICATION(S): at 05:57

## 2025-03-08 RX ADMIN — NOREPINEPHRINE BITARTRATE 6 MICROGRAM(S)/KG/MIN: 8 SOLUTION at 19:48

## 2025-03-08 RX ADMIN — SODIUM CHLORIDE 1000 MILLILITER(S): 9 INJECTION, SOLUTION INTRAVENOUS at 06:53

## 2025-03-08 RX ADMIN — OXYCODONE HYDROCHLORIDE 2.5 MILLIGRAM(S): 30 TABLET ORAL at 20:52

## 2025-03-08 RX ADMIN — SODIUM CHLORIDE 1500 MILLILITER(S): 9 INJECTION, SOLUTION INTRAVENOUS at 06:19

## 2025-03-08 RX ADMIN — Medication 25 GRAM(S): at 06:20

## 2025-03-08 RX ADMIN — SODIUM CHLORIDE 120 MILLILITER(S): 9 INJECTION, SOLUTION INTRAVENOUS at 02:14

## 2025-03-08 RX ADMIN — Medication 80 MILLIGRAM(S): at 20:22

## 2025-03-08 RX ADMIN — Medication 1 CAPSULE(S): at 08:15

## 2025-03-08 RX ADMIN — INSULIN LISPRO 4: 100 INJECTION, SOLUTION INTRAVENOUS; SUBCUTANEOUS at 17:10

## 2025-03-08 RX ADMIN — INSULIN GLARGINE-YFGN 26 UNIT(S): 100 INJECTION, SOLUTION SUBCUTANEOUS at 22:25

## 2025-03-08 RX ADMIN — Medication 255 MILLIMOLE(S): at 09:53

## 2025-03-08 RX ADMIN — Medication 40 MILLIEQUIVALENT(S): at 19:48

## 2025-03-08 RX ADMIN — ENOXAPARIN SODIUM 40 MILLIGRAM(S): 100 INJECTION SUBCUTANEOUS at 14:49

## 2025-03-08 RX ADMIN — Medication 2 TABLET(S): at 22:18

## 2025-03-08 RX ADMIN — Medication 100 MILLIGRAM(S): at 01:33

## 2025-03-08 RX ADMIN — Medication 250 MILLIGRAM(S): at 11:09

## 2025-03-08 RX ADMIN — Medication 1 CAPSULE(S): at 17:10

## 2025-03-08 RX ADMIN — INSULIN LISPRO 4: 100 INJECTION, SOLUTION INTRAVENOUS; SUBCUTANEOUS at 11:16

## 2025-03-08 RX ADMIN — Medication 25 GRAM(S): at 14:49

## 2025-03-08 RX ADMIN — Medication 1 CAPSULE(S): at 12:22

## 2025-03-08 RX ADMIN — OXYCODONE HYDROCHLORIDE 2.5 MILLIGRAM(S): 30 TABLET ORAL at 20:22

## 2025-03-08 RX ADMIN — Medication 25 GRAM(S): at 19:48

## 2025-03-08 RX ADMIN — NOREPINEPHRINE BITARTRATE 6 MICROGRAM(S)/KG/MIN: 8 SOLUTION at 08:07

## 2025-03-08 RX ADMIN — Medication 100 MILLIGRAM(S): at 10:26

## 2025-03-08 RX ADMIN — POTASSIUM PHOSPHATE, MONOBASIC POTASSIUM PHOSPHATE, DIBASIC INJECTION, 83.33 MILLIMOLE(S): 236; 224 SOLUTION, CONCENTRATE INTRAVENOUS at 11:09

## 2025-03-08 RX ADMIN — Medication 100 MILLIGRAM(S): at 18:41

## 2025-03-08 NOTE — CONSULT NOTE ADULT - ASSESSMENT
59y Male with PMHx pancreatic ca s/p chemo 2017, DM, presents to ED with rectal pain. 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 Patient had been seen by a colorectal surgeon Dr Johnson in the office on Thur (3/6), s/p fistula I&D outpt, 14 days Augmentin was prescribed  (took 3 pills). CT today showed "Inflammation and subcutaneous emphysema involving the scrotal sac, perineal region, perianal region, right penile shaft, and space between the prostate gland and the left obturator internus."   In ED, patient AVSS,  UA negative, most recent Urine culture on 3/3/2025, no growth. WBC 11.5, H/H 11/34, Cr 0.98, lactate 1.6.     Plan:  -  59y Male with PMHx pancreatic ca s/p chemo 2017, DM, presents to ED with rectal pain. 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 Patient had been seen by a colorectal surgeon Dr Johnson in the office on Thur (3/6), s/p fistula I&D outpt, 14 days Augmentin was prescribed  (took 3 pills). CT today showed "Inflammation and subcutaneous emphysema involving the scrotal sac, perineal region, perianal region, right penile shaft, and space between the prostate gland and the left obturator internus."   In ED, patient AVSS,  UA negative, most recent Urine culture on 3/3/2025, no growth. WBC 11.5, H/H 11/34, Cr 0.98, lactate 1.6.     Plan:  - Recommend general surgery admission for urgent OR  - Continue antibiotics  - Urology will assist in OR for debridement of scrotum   - Case discussed with attending 59y Male with PMHx pancreatic ca s/p chemo 2017, DM, presents to ED with rectal pain. 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 Patient had been seen by a colorectal surgeon Dr Johnson in the office on Thur (3/6), s/p fistula I&D outpt, 14 days Augmentin was prescribed  (took 3 pills). CT today showed "Inflammation and subcutaneous emphysema involving the scrotal sac, perineal region, perianal region, right penile shaft, and space between the prostate gland and the left obturator internus."   In ED, patient AVSS,  UA negative, most recent Urine culture on 3/3/2025, no growth. WBC 11.5, H/H 11/34, Cr 0.98, lactate 1.6.     Plan:  - Recommend general surgery admission for urgent OR  - Continue antibiotics  - Urology will assist in OR for debridement of scrotum   - Case discussed with Dr. rogers

## 2025-03-08 NOTE — PROGRESS NOTE ADULT - SUBJECTIVE AND OBJECTIVE BOX
Subjective:  Resting    Objective:    Vital signs  T(C): , Max: 37.1 (03-08-25 @ 04:42)  HR: 77 (03-08-25 @ 10:30)  BP: 89/50 (03-08-25 @ 10:30)  SpO2: 97% (03-08-25 @ 10:30)  Wt(kg): --    Output     03-07 @ 07:01  -  03-08 @ 07:00  --------------------------------------------------------  IN: 1175 mL / OUT: 165 mL / NET: 1010 mL    03-08 @ 06:01  -  03-08 @ 10:45  --------------------------------------------------------  IN: 443 mL / OUT: 235 mL / NET: 208 mL        Gen: NAD  Abd: soft, nontender, nondistended  : almonte secured in place, draining CYU, scrotum soft, no crepitus or fluctuance, minimal induration at inferior aspect of scrotal wall, no necrotic skin changes    Labs                        12.1   10.72 )-----------( 159      ( 08 Mar 2025 05:35 )             35.9     08 Mar 2025 05:35    141    |  101    |  10     ----------------------------<  181    3.3     |  23     |  0.88     Ca    8.5        08 Mar 2025 05:35  Phos  1.8       08 Mar 2025 05:35  Mg     2.0       08 Mar 2025 05:35        Urine Cx: ?  Blood Cx: ?      Imaging

## 2025-03-08 NOTE — CONSULT NOTE ADULT - CRITICAL CARE ATTENDING COMMENT
This patient presents after undergoing debridement for soft tissue necrotizing wound infection  showing soft blood pressures - essentially hypotensive  prepared to give pressors although showing response to fluid resuscitation  coordinating broad spectrum antibiotics  facilitating therapies for septic hypotension

## 2025-03-08 NOTE — CONSULT NOTE ADULT - SUBJECTIVE AND OBJECTIVE BOX
HPI:  59y Male with PMHx pancreatic ca s/p chemo 2017, DM, presents to ED with rectal pain. 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 patient states has rectal pain since last  and went to [facility] ED, no acute findings at that time, and then discharge home. Patient had been seen by a colorectal surgeon in the office on Thur (3/6), was told he had a fistula, I&D was performed outpt w/ copious pus, 14 days Augmentin was prescribed  (took 3 pills). Patient reports increasing rectal pain, scrotal swelling, subjective fevers., so he presented to the ED. CT today showed "Inflammation and subcutaneous emphysema involving the scrotal sac, perineal region, perianal region, right penile shaft, and space between the prostate gland and the left obturator internus."  In ED, patient AVSS.    PAST MEDICAL & SURGICAL HISTORY:  Pancreatic cancer      DM (diabetes mellitus)      No significant past surgical history          FAMILY HISTORY:  No known  malignancy     Denies alcohol and drug abuse, nonsmoker     MEDICATIONS  (STANDING):  clindamycin IVPB 600 milliGRAM(s) IV Intermittent once    MEDICATIONS  (PRN):    Allergies    No Known Allergies    Intolerances      REVIEW OF SYSTEMS: Pertinent positives and negatives as stated in HPI, otherwise negative    Vital signs  T(C): 36.7 (25 @ 20:40), Max: 37 (25 @ 19:36)  HR: 90 (25 @ 21:10)  BP: 104/59 (25 @ 21:10)  SpO2: 99% (25 @ 21:10)  Wt(kg): --    Physical Exam  Gen: NAD  HEENT: normocephalic, atraumatic, no scleral icterus  Pulm: CTA b/l, No respiratory distress, no subcostal retractions, no accessory muscle use   CV: S1 S2, RRR,  no JVD  Abd: Soft, NT, ND, no rebound tenderness or guarding  : I&D site covered by dressing, c/d/i, a few old yellowish purulent drainage noted on dressing, b/l scrotum edematous, + Crepitus to palpation around the perineal area, no foul odor.    LABS:  CBC                       11.6   11.50 )-----------( 180      ( 07 Mar 2025 20:55 )             34.4     BMP   03-07    135  |  100  |  13  ----------------------------<  212[H]  3.5   |  23  |  0.98    Ca    8.4      07 Mar 2025 20:55  Phos  1.2     03-07  Mg     2.1     03-07    TPro  6.3  /  Alb  3.0[L]  /  TBili  0.8  /  DBili  x   /  AST  32  /  ALT  34  /  AlkPhos  124[H]  03-07    PT/INR - ( 07 Mar 2025 20:55 )   PT: 15.7 sec;   INR: 1.37 ratio         PTT - ( 07 Mar 2025 20:55 )  PTT:31.5 sec    Urinalysis Basic - ( 07 Mar 2025 23:48 )    Color: Yellow / Appearance: Clear / S.023 / pH: x  Gluc: x / Ketone: Negative mg/dL  / Bili: Negative / Urobili: 0.2 mg/dL   Blood: x / Protein: Negative mg/dL / Nitrite: Negative   Leuk Esterase: Negative / RBC: 0 /HPF / WBC 0 /HPF   Sq Epi: x / Non Sq Epi: 0 /HPF / Bacteria: Negative /HPF      Urine Cx:   Blood Cx:    Radiology: HPI:  59y Male with PMHx pancreatic ca s/p chemo 2017, DM, presents to ED with rectal pain. 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 patient states has rectal pain since last  and went to [facility] ED, no acute findings at that time, and then discharge home. Patient had been seen by a colorectal surgeon Dr. Johnson in the office on Thur (3/6), was told he had a fistula, I&D was performed outpt w/ copious pus, 14 days Augmentin was prescribed  (took 3 pills). Patient reports increasing rectal pain, scrotal swelling, subjective fevers., so he presented to the ED. CT today showed "Inflammation and subcutaneous emphysema involving the scrotal sac, perineal region, perianal region, right penile shaft, and space between the prostate gland and the left obturator internus." Denies hematuria, dysuria, urinary frequency, urgency, or any urinary symptoms. Denies fever, sob, chest pain, n/v.   Most recent Urine culture on 3/3/2025, no growth.       PAST MEDICAL & SURGICAL HISTORY:  Pancreatic cancer      DM (diabetes mellitus)      No significant past surgical history          FAMILY HISTORY:  No known  malignancy     Denies alcohol and drug abuse, nonsmoker     MEDICATIONS  (STANDING):  clindamycin IVPB 600 milliGRAM(s) IV Intermittent once    MEDICATIONS  (PRN):    Allergies    No Known Allergies    Intolerances      REVIEW OF SYSTEMS: Pertinent positives and negatives as stated in HPI, otherwise negative    Vital signs  T(C): 36.7 (25 @ 20:40), Max: 37 (25 @ 19:36)  HR: 90 (25 @ 21:10)  BP: 104/59 (25 @ 21:10)  SpO2: 99% (25 @ 21:10)  Wt(kg): --    Physical Exam  Gen: NAD  HEENT: normocephalic, atraumatic, no scleral icterus  Pulm: CTA b/l, No respiratory distress, no subcostal retractions, no accessory muscle use   CV: S1 S2, RRR,  no JVD  Abd: Soft, NT, ND, no rebound tenderness or guarding  : I&D site covered by dressing, c/d/i, a few old yellowish purulent drainage noted on dressing, b/l scrotum edematous, no erythema, drainage, + tender at the bottom of scrotum. + Crepitus to palpation around the perineal area, no foul odor.     LABS:  CBC                       11.6   11.50 )-----------( 180      ( 07 Mar 2025 20:55 )             34.4     BMP   03-07    135  |  100  |  13  ----------------------------<  212[H]  3.5   |  23  |  0.98    Ca    8.4      07 Mar 2025 20:55  Phos  1.2     03-07  Mg     2.1     03-07    TPro  6.3  /  Alb  3.0[L]  /  TBili  0.8  /  DBili  x   /  AST  32  /  ALT  34  /  AlkPhos  124[H]  03-07    PT/INR - ( 07 Mar 2025 20:55 )   PT: 15.7 sec;   INR: 1.37 ratio         PTT - ( 07 Mar 2025 20:55 )  PTT:31.5 sec    Urinalysis Basic - ( 07 Mar 2025 23:48 )    Color: Yellow / Appearance: Clear / S.023 / pH: x  Gluc: x / Ketone: Negative mg/dL  / Bili: Negative / Urobili: 0.2 mg/dL   Blood: x / Protein: Negative mg/dL / Nitrite: Negative   Leuk Esterase: Negative / RBC: 0 /HPF / WBC 0 /HPF   Sq Epi: x / Non Sq Epi: 0 /HPF / Bacteria: Negative /HPF    Radiology:  ACC: [id number] EXAM:  CT ABDOMEN AND PELVIS IC   ORDERED BY: MILVIA TSE     PROCEDURE DATE:  2025          INTERPRETATION:  CLINICAL INFORMATION: Perineal abscess.    COMPARISON: 3/3/2025    CONTRAST/COMPLICATIONS:  IV Contrast: Omnipaque 350  90 cc administered   10 cc discarded  Oral Contrast: NONE  .    PROCEDURE:  CT of the Abdomen and Pelvis was performed.  Sagittal and coronal reformats were performed.    FINDINGS:  LOWER CHEST: Minimal linear atelectasis. Borderline heart size. Calcified   atherosclerosis of the coronary arteries.    LIVER: Grossly stable indeterminate hyperenhancing lesion in the   posterior subcapsular margin of segment 7 measures up to 1.1 cm and may   be further assessed and characterized with outpatient contrast-enhanced   MRI liver mass protocol.  BILE DUCTS: Mild central pneumobilia, with a left predominance.  GALLBLADDER: Cholecystectomy.  SPLEEN: Within normal limits.  PANCREAS: Atrophic and infiltrated by fat. There are small gas   collections scattered throughout the pancreatic duct, which is not   significantly dilated.  ADRENALS: Within normal limits.  KIDNEYS/URETERS: Within normal limits.    BLADDER: Moderate diffuse wall thickening and mild mucosal   hyperenhancement are findings that need further assessment and   characterization with urinalysis and urine cultures, to exclude an   underlying cystitis.  REPRODUCTIVE ORGANS: Gas is tracing down the space between the prostate   gland and the medial surface of the left obturator internus. The prostate   gland is mildly enlarged. There is also subcutaneous gas along the right   side of the penile shaft, and extending to the adjacent perineal region,   perianal region, and scrotal sac. Complex fluid is seen surrounding both   testicles. Inflammatory changes are seen throughout all these described   areas in the pelvis.    BOWEL: No bowel obstruction. Appendix is not visualized. No evidence of   inflammation in the pericecal region. Partial bowel resection surgical   changes, without CT evident complications.  PERITONEUM/RETROPERITONEUM: Within normal limits.  VESSELS: Atherosclerotic changes.  LYMPH NODES: No lymphadenopathy.  ABDOMINAL WALL: Postsurgical changes.  BONES: Degenerative changes.    IMPRESSION:  Inflammation andsubcutaneous emphysema involving the scrotal sac,   perineal region, perianal region, right penile shaft, and space between   the prostate gland and the left obturator internus. Complex fluid is seen   within the scrotum as well. These findings are suspicious for an ongoing   gas producing infectious process, although no definite organized abscess   is visualized. Correlate with physical examination.        --- End of Report ---

## 2025-03-08 NOTE — H&P ADULT - HISTORY OF PRESENT ILLNESS
HPI:  59M PMH DM, pancreatic cancer, s/p whipple in [facility], presents with perirectal pain. Patient states he had perirectal pain, and an abscess was drained at an outpt office and he was sent home on PO antibiotics. The patient noticed worsening scrotal swelling, pain so he presented to the ED. 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     PAST MEDICAL HISTORY:  Pancreatic cancer    DM (diabetes mellitus)        PAST SURGICAL HISTORY:  No significant past surgical history        ALLERGIES:  No Known Allergies      MEDICATIONS:  clindamycin IVPB 600 milliGRAM(s) IV Intermittent once      VITALS & I/Os:  Vital Signs Last 24 Hrs  T(C): 36.7 (07 Mar 2025 20:40), Max: 37 (07 Mar 2025 19:36)  T(F): 98.1 (07 Mar 2025 20:40), Max: 98.6 (07 Mar 2025 19:36)  HR: 90 (07 Mar 2025 21:10) (90 - 117)  BP: 104/59 (07 Mar 2025 21:10) (97/59 - 110/64)  BP(mean): 77 (07 Mar 2025 21:10) (77 - 84)  RR: 20 (07 Mar 2025 21:10) (20 - 20)  SpO2: 99% (07 Mar 2025 21:10) (98% - 99%)    Parameters below as of 07 Mar 2025 21:10  Patient On (Oxygen Delivery Method): room air        I&O's Summary      PHYSICAL EXAM:  General: No acute distress  Respiratory: Nonlabored respirations   Cardiovascular: pulse present  Abdominal: Soft, nondistended, nontender  : I&D site c/d/i without active purulent drainage, R perineum and scrotum edematous, warm to touch, and tender  Extremities: Appears warm and well perfused    LABS:                        11.6   11.50 )-----------( 180      ( 07 Mar 2025 20:55 )             34.4     03-07    135  |  100  |  13  ----------------------------<  212[H]  3.5   |  23  |  0.98    Ca    8.4      07 Mar 2025 20:55  Phos  1.2     03-07  Mg     2.1     03-07    TPro  6.3  /  Alb  3.0[L]  /  TBili  0.8  /  DBili  x   /  AST  32  /  ALT  34  /  AlkPhos  124[H]  03-07    Lactate:    PT/INR - ( 07 Mar 2025 20:55 )   PT: 15.7 sec;   INR: 1.37 ratio         PTT - ( 07 Mar 2025 20:55 )  PTT:31.5 sec          Urinalysis Basic - ( 07 Mar 2025 20:55 )    Color: x / Appearance: x / SG: x / pH: x  Gluc: 212 mg/dL / Ketone: x  / Bili: x / Urobili: x   Blood: x / Protein: x / Nitrite: x   Leuk Esterase: x / RBC: x / WBC x   Sq Epi: x / Non Sq Epi: x / Bacteria: x        IMAGING:  < from: CT Abdomen and Pelvis w/ IV Cont (03.07.25 @ 21:14) >  PROCEDURE:  CT of the Abdomen and Pelvis was performed.  Sagittal and coronal reformats were performed.    FINDINGS:  LOWER CHEST: Minimal linear atelectasis. Borderline heart size. Calcified   atherosclerosis of the coronary arteries.    LIVER: Grossly stable indeterminate hyperenhancing lesion in the   posterior subcapsular margin of segment 7 measures up to 1.1 cm and may   be further assessed and characterized with outpatient contrast-enhanced   MRI liver mass protocol.  BILE DUCTS: Mild central pneumobilia, with a left predominance.  GALLBLADDER: Cholecystectomy.  SPLEEN: Within normal limits.  PANCREAS: Atrophic and infiltrated by fat. There are small gas   collections scattered throughout the pancreatic duct, which is not   significantly dilated.  ADRENALS: Within normal limits.  KIDNEYS/URETERS: Within normal limits.    BLADDER: Moderate diffuse wall thickening and mild mucosal   hyperenhancement are findings that need further assessment and   characterization with urinalysis and urine cultures, to exclude an   underlying cystitis.  REPRODUCTIVE ORGANS: Gas is tracing down the space between the prostate   gland and the medial surface of the left obturator internus. The prostate   gland is mildly enlarged. There is also subcutaneous gas along the right   side of the penile shaft, and extending to the adjacent perineal region,   perianal region, and scrotal sac. Complex fluid is seen surrounding both   testicles. Inflammatory changes are seen throughout all these described   areas in the pelvis.    BOWEL: No bowel obstruction. Appendix is not visualized. No evidence of   inflammation in the pericecal region. Partial bowel resection surgical   changes, without CT evident complications.  PERITONEUM/RETROPERITONEUM: Within normal limits.  VESSELS: Atherosclerotic changes.  LYMPH NODES: No lymphadenopathy.  ABDOMINAL WALL: Postsurgical changes.  BONES: Degenerative changes.    IMPRESSION:  Inflammation andsubcutaneous emphysema involving the scrotal sac,   perineal region, perianal region, right penile shaft, and space between   the prostate gland and the left obturator internus. Complex fluid is seen   within the scrotum as well. These findings are suspicious for an ongoing   gas producing infectious process, although no definite organized abscess   is visualized. Correlate with physical examination.    < end of copied text >

## 2025-03-08 NOTE — ED ADULT NURSE REASSESSMENT NOTE - NS ED NURSE REASSESS COMMENT FT1
Pt to go to OR. Valuable form [id number] filled out and placed in chart. 3 bags of clothing placed in the purple cabinet.

## 2025-03-08 NOTE — H&P ADULT - ASSESSMENT
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 Wallis    Trauma/ACS Team  [phone]

## 2025-03-08 NOTE — CONSULT NOTE ADULT - ATTENDING COMMENTS
As above  labs, imaging reviewed  perineal abscess, possible scrotal involvement  OR for open debridement with Gen Surg

## 2025-03-08 NOTE — PROGRESS NOTE ADULT - ASSESSMENT
59M PMH DM, pancreatic cancer, s/p whipple in [facility] presents with scrotal and perineal swelling c/f Fornier's gangrene. The patient is now s/p I&D of Perineum and Groin, and Scrotal Exploration with ACS/Trauma and Urology.     Recommendations  - DVT ppx: Lvx  - Diet: CC  - IV abx - vanc, zosyn, clinda  - pain control PRN   - OR tomorrow for Groin Exploration  - appreciate excellent care of SICU    ACS/Trauma Surgery  [id number]

## 2025-03-08 NOTE — PROGRESS NOTE ADULT - SUBJECTIVE AND OBJECTIVE BOX
TEAM [ACS/Trauma] Surgery Daily Progress Note  =====================================================    SUBJECTIVE: Patient seen and examined at bedside on AM rounds. The patient is resting comfortably in bed, pain is well controlled. Voiding via almonte. Denies fever, chills, nausea, vomiting, diarrhea, chest pain, and SOB.    PMH:   PAST MEDICAL & SURGICAL HISTORY:  Pancreatic cancer      DM (diabetes mellitus)      No significant past surgical history          ALLERGIES:  No Known Allergies      --------------------------------------------------------------------------------------    MEDICATIONS:    Neurologic Medications  acetaminophen   IVPB .. 1000 milliGRAM(s) IV Intermittent every 6 hours PRN Mild Pain (1 - 3)  oxyCODONE    IR 2.5 milliGRAM(s) Oral every 4 hours PRN Moderate Pain (4 - 6)    Respiratory Medications    Cardiovascular Medications  norepinephrine Infusion 0.05 MICROgram(s)/kG/Min IV Continuous <Continuous>    Gastrointestinal Medications  pancrelipase  (CREON 12,000 Lipase Units) 1 Capsule(s) Oral three times a day with meals  polyethylene glycol 3350 17 Gram(s) Oral daily  senna 2 Tablet(s) Oral at bedtime    Genitourinary Medications    Hematologic/Oncologic Medications  enoxaparin Injectable 40 milliGRAM(s) SubCutaneous every 24 hours    Antimicrobial/Immunologic Medications  clindamycin IVPB 600 milliGRAM(s) IV Intermittent every 8 hours  piperacillin/tazobactam IVPB.. 3.375 Gram(s) IV Intermittent every 8 hours  vancomycin  IVPB 1000 milliGRAM(s) IV Intermittent every 12 hours    Endocrine/Metabolic Medications  insulin glargine Injectable (LANTUS) 26 Unit(s) SubCutaneous at bedtime  insulin lispro (ADMELOG) corrective regimen sliding scale   SubCutaneous three times a day before meals  insulin lispro (ADMELOG) corrective regimen sliding scale   SubCutaneous at bedtime    Topical/Other Medications  chlorhexidine 2% Cloths 1 Application(s) Topical <User Schedule>    --------------------------------------------------------------------------------------    VITAL SIGNS:    T(C): 37 (03-08-25 @ 15:00), Max: 37.1 (03-08-25 @ 04:42)  HR: 81 (03-08-25 @ 16:30) (70 - 117)  BP: 104/57 (03-08-25 @ 16:30) (78/50 - 138/62)  RR: 23 (03-08-25 @ 16:30) (11 - 26)  SpO2: 98% (03-08-25 @ 16:30) (97% - 100%)    --------------------------------------------------------------------------------------    EXAM    General: NAD, resting in bed comfortably.  Cardiac: regular rate, warm and well perfused  Respiratory: Nonlabored respirations, normal cw expansion.  Abdomen: soft, nontender, nondistended  : dressing c/d/i  Extremities: normal strength, FROM, no deformities    --------------------------------------------------------------------------------------    LABS        --------------------------------------    Culture - Tissue with Gram Stain (collected 03-08-25 @ 05:59)  Source: Tissue Perineal Wound Culture (Tissue)  Gram Stain (03-08-25 @ 14:24):    Few polymorphonuclear leukocytes per low power field    Moderate Gram Negative Rods per oil power field    Moderate Gram Positive Rods per oil power field    Moderate Gram positive cocci in pairs per oil power field        --->>> <<<---------------------------------------------    INS AND OUTS:    03-07-25 @ 07:01  -  03-08-25 @ 07:00  --------------------------------------------------------  IN: 1175 mL / OUT: 165 mL / NET: 1010 mL    03-08-25 @ 06:01  -  03-08-25 @ 16:38  --------------------------------------------------------  IN: 1395.6 mL / OUT: 910 mL / NET: 485.6 mL        --------------------------------------------------------------------------------------

## 2025-03-08 NOTE — PROGRESS NOTE ADULT - ASSESSMENT
59y Male with PMHx pancreatic ca s/p chemo 2017, DM, presents to ED with rectal pain. 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 Patient had been seen by a colorectal surgeon Dr Johnson in the office on Thur (3/6), s/p fistula I&D outpt, 14 days Augmentin was prescribed  (took 3 pills). CT today showed "Inflammation and subcutaneous emphysema involving the scrotal sac, perineal region, perianal region, right penile shaft, and space between the prostate gland and the left obturator internus."   In ED, patient AVSS,  UA negative, most recent Urine culture on 3/3/2025, no growth. WBC 11.5, H/H 11/34, Cr 0.98, lactate 1.6 s/p debridement with general surgery on 3/8/25, minimal necrotic tissue in inferior scrotum with primarily scrotal edema    3/8: scrotum soft, no crepitus or fluctuance, minimal induration at inferior aspect of scrotal wall, no necrotic skin changes, WBC wnl, on 0.09 levo    Recommendations  - Espinoza per primary team  - Continue antibiotics  - Trend WBC and monitor for fevers  - Ween pressors as tolerated  - Please reach out to urology with further questions    [facility]  [address] [place] [id number] [phone]

## 2025-03-08 NOTE — CONSULT NOTE ADULT - SUBJECTIVE AND OBJECTIVE BOX
SICU Consult Note    HPI: 86 M PMH DM, pancreatic cancer, s/p whipple in [facility], presents s/p irrigation and debridement of perineum and groin iso Pema's gangrene. Admitted to SICU for postop hemodynamic monitoring.      PMH:  PAST MEDICAL & SURGICAL HISTORY:    Pancreatic cancer s/p whipple    DM (diabetes mellitus)    Home Medications:    Insulin Glargine: 26 unit(s) once a day (at bedtime) (08 Mar 2025 02:21)  Vitamin D3 50,000 intl units oral capsule: 1 cap(s) orally once a day   Creon 12,000 units oral delayed release capsule: 1 cap(s) orally 3 times a day  Crestor 20 mg oral tablet: 1 tab(s) orally once a day (at bedtime)        ALLERGIES:  No Known Allergies      --------------------------------------------------------------------------------------    MEDICATIONS:    Neurologic Medications  fentaNYL    Injectable 25 MICROGram(s) IV Push every 5 minutes PRN Moderate Pain (4 - 6)  fentaNYL    Injectable 50 MICROGram(s) IV Push every 15 minutes PRN Severe Pain (7 - 10)  ondansetron Injectable 4 milliGRAM(s) IV Push once PRN Nausea and/or Vomiting    Respiratory Medications    Cardiovascular Medications    Gastrointestinal Medications  lactated ringers. 1000 milliLiter(s) IV Continuous <Continuous>    Genitourinary Medications    Hematologic/Oncologic Medications    Antimicrobial/Immunologic Medications    Endocrine/Metabolic Medications  insulin lispro (ADMELOG) corrective regimen sliding scale   SubCutaneous every 6 hours    Topical/Other Medications  chlorhexidine 4% Liquid 1 Application(s) Topical daily    --------------------------------------------------------------------------------------    VITAL SIGNS:  T(C): 37.1 (03-08-25 @ 04:42), Max: 37.1 (03-08-25 @ 04:42)  HR: 100 (03-08-25 @ 04:42) (90 - 117)  BP: 109/58 (03-08-25 @ 04:42) (97/59 - 138/62)  RR: 20 (03-08-25 @ 04:42) (20 - 20)  SpO2: 100% (03-08-25 @ 04:42) (98% - 100%)  --------------------------------------------------------------------------------------    INS AND OUTS:        --------------------------------------------------------------------------------------    EXAM    NEURO: Alert, drowsy, A&Ox4, calm, cooperative with care, denies pain or paresthesia  HEENT: NC/AT, sclera white without erythema or drainage, no obvious hearing abnormality, oral mucosa moist, intact  RESPIRATORY: lungs CTA bilaterally, nonlabored respirations, normal CW expansion, on NC, Sp02 100%  CARDIO: RRR, S1S2, no MRG, no edema, distal peripheral pulses +x4 extremities  ABDOMEN: flat, soft, nontender, nondistended, healed surgical scar to RUQ  : almonte in place w/ clear, straw colored urine, dressing to perineum C/D/I  EXTREMITIES: 5/5 strength to extremites x4  --------------------------------------------------------------------------------------    LABS  CBC Full  -  ( 07 Mar 2025 20:55 )  WBC Count : 11.50 K/uL  RBC Count : 4.30 M/uL  Hemoglobin : 11.6 g/dL  Hematocrit : 34.4 %  Platelet Count - Automated : 180 K/uL  Mean Cell Volume : 80.0 fl  Mean Cell Hemoglobin : 27.0 pg  Mean Cell Hemoglobin Concentration : 33.7 g/dL    03-07    135  |  100  |  13  ----------------------------<  212[H]  3.5   |  23  |  0.98    Ca    8.4      07 Mar 2025 20:55  Phos  1.2     03-07  Mg     2.1     03-07    TPro  6.3  /  Alb  3.0[L]  /  TBili  0.8  /  DBili  x   /  AST  32  /  ALT  34  /  AlkPhos  124[H]  03-07    PT/INR - ( 07 Mar 2025 20:55 )   PT: 15.7 sec;   INR: 1.37 ratio       PTT - ( 07 Mar 2025 20:55 )  PTT:31.5 sec  --------------------------------------------------------------------------------------    Assessment/Plan:    Assessment:    86 M PMH DM, pancreatic cancer, s/p whipple in [facility], presents s/p irrigation and debridement of perineum and groin iso Pema's gangrene. Admitted to SICU for postop hemodynamic monitoring.    Plan:    Neuro  #CAM  -tylenol, oxy prn for pain    Respiratory  #CAM  -Sp 02>92%  -Titrate O2 as tolerated    Cardiovascular  #CAM  -MAP >65    GI  #pancreatic CA s/p whipple  #pancreatic insufficiency  -consistent carb diet   -home creon w/ meals  -bowel regimen    /Renal  #Pema's gangrene s/p incision and debridement  -almonte   -strict I&O  -CMP daily    ID  #Pema's gangrene  -CBC daily  -trend temp  -clindamycin, vanc, zosyn  -f/u urine cx, MRSA/MSSA  -f/u tissue cultures    Heme  -CBC daily  -SCDs    Endocrine  -home lantus 26u  -ISS ACTIDHS  -f/u A1C      MSK:  -Activity as tolerated     LTD: gage CAMPO 3/8-

## 2025-03-09 ENCOUNTER — TRANSCRIPTION ENCOUNTER (OUTPATIENT)
Age: 60
End: 2025-03-09

## 2025-03-09 LAB
ALBUMIN SERPL ELPH-MCNC: 2.4 G/DL — LOW (ref 3.3–5)
ALP SERPL-CCNC: 106 U/L — SIGNIFICANT CHANGE UP (ref 40–120)
ALT FLD-CCNC: 27 U/L — SIGNIFICANT CHANGE UP (ref 10–45)
ANION GAP SERPL CALC-SCNC: 11 MMOL/L — SIGNIFICANT CHANGE UP (ref 5–17)
ANION GAP SERPL CALC-SCNC: 11 MMOL/L — SIGNIFICANT CHANGE UP (ref 5–17)
APTT BLD: 32 SEC — SIGNIFICANT CHANGE UP (ref 24.5–35.6)
AST SERPL-CCNC: 29 U/L — SIGNIFICANT CHANGE UP (ref 10–40)
BILIRUB DIRECT SERPL-MCNC: 0.2 MG/DL — SIGNIFICANT CHANGE UP (ref 0–0.3)
BILIRUB INDIRECT FLD-MCNC: 0.4 MG/DL — SIGNIFICANT CHANGE UP (ref 0.2–1)
BILIRUB SERPL-MCNC: 0.6 MG/DL — SIGNIFICANT CHANGE UP (ref 0.2–1.2)
BUN SERPL-MCNC: 10 MG/DL — SIGNIFICANT CHANGE UP (ref 7–23)
CA-I BLD-SCNC: 1 MMOL/L — LOW (ref 1.15–1.33)
CALCIUM SERPL-MCNC: 7.3 MG/DL — LOW (ref 8.4–10.5)
CALCIUM SERPL-MCNC: 7.5 MG/DL — LOW (ref 8.4–10.5)
CHLORIDE SERPL-SCNC: 100 MMOL/L — SIGNIFICANT CHANGE UP (ref 96–108)
CHLORIDE SERPL-SCNC: 103 MMOL/L — SIGNIFICANT CHANGE UP (ref 96–108)
CO2 SERPL-SCNC: 22 MMOL/L — SIGNIFICANT CHANGE UP (ref 22–31)
CO2 SERPL-SCNC: 23 MMOL/L — SIGNIFICANT CHANGE UP (ref 22–31)
CREAT SERPL-MCNC: 0.71 MG/DL — SIGNIFICANT CHANGE UP (ref 0.5–1.3)
CREAT SERPL-MCNC: 0.95 MG/DL — SIGNIFICANT CHANGE UP (ref 0.5–1.3)
EGFR: 106 ML/MIN/1.73M2 — SIGNIFICANT CHANGE UP
EGFR: 106 ML/MIN/1.73M2 — SIGNIFICANT CHANGE UP
EGFR: 93 ML/MIN/1.73M2 — SIGNIFICANT CHANGE UP
EGFR: 93 ML/MIN/1.73M2 — SIGNIFICANT CHANGE UP
GAS PNL BLDA: SIGNIFICANT CHANGE UP
GLUCOSE BLDC GLUCOMTR-MCNC: 122 MG/DL — HIGH (ref 70–99)
GLUCOSE BLDC GLUCOMTR-MCNC: 155 MG/DL — HIGH (ref 70–99)
GLUCOSE BLDC GLUCOMTR-MCNC: 161 MG/DL — HIGH (ref 70–99)
GLUCOSE BLDC GLUCOMTR-MCNC: 275 MG/DL — HIGH (ref 70–99)
GLUCOSE SERPL-MCNC: 127 MG/DL — HIGH (ref 70–99)
HCT VFR BLD CALC: 30.1 % — LOW (ref 39–50)
HCT VFR BLD CALC: 33.1 % — LOW (ref 39–50)
HGB BLD-MCNC: 10.1 G/DL — LOW (ref 13–17)
HGB BLD-MCNC: 11.2 G/DL — LOW (ref 13–17)
INR BLD: 1.46 RATIO — HIGH (ref 0.85–1.16)
MAGNESIUM SERPL-MCNC: 2.6 MG/DL — SIGNIFICANT CHANGE UP (ref 1.6–2.6)
MCHC RBC-ENTMCNC: 26.6 PG — LOW (ref 27–34)
MCHC RBC-ENTMCNC: 33.6 G/DL — SIGNIFICANT CHANGE UP (ref 32–36)
MCHC RBC-ENTMCNC: 33.8 G/DL — SIGNIFICANT CHANGE UP (ref 32–36)
MCV RBC AUTO: 79.2 FL — LOW (ref 80–100)
MCV RBC AUTO: 80.5 FL — SIGNIFICANT CHANGE UP (ref 80–100)
NRBC BLD AUTO-RTO: 0 /100 WBCS — SIGNIFICANT CHANGE UP (ref 0–0)
NRBC BLD AUTO-RTO: 0 /100 WBCS — SIGNIFICANT CHANGE UP (ref 0–0)
PHOSPHATE SERPL-MCNC: 2.1 MG/DL — LOW (ref 2.5–4.5)
PLATELET # BLD AUTO: 153 K/UL — SIGNIFICANT CHANGE UP (ref 150–400)
PLATELET # BLD AUTO: 174 K/UL — SIGNIFICANT CHANGE UP (ref 150–400)
POTASSIUM SERPL-MCNC: 3.2 MMOL/L — LOW (ref 3.5–5.3)
POTASSIUM SERPL-MCNC: 3.5 MMOL/L — SIGNIFICANT CHANGE UP (ref 3.5–5.3)
POTASSIUM SERPL-SCNC: 3.2 MMOL/L — LOW (ref 3.5–5.3)
POTASSIUM SERPL-SCNC: 3.5 MMOL/L — SIGNIFICANT CHANGE UP (ref 3.5–5.3)
PROT SERPL-MCNC: 5.3 G/DL — LOW (ref 6–8.3)
PROTHROM AB SERPL-ACNC: 16.7 SEC — HIGH (ref 9.9–13.4)
RBC # BLD: 3.8 M/UL — LOW (ref 4.2–5.8)
RBC # BLD: 4.11 M/UL — LOW (ref 4.2–5.8)
RBC # FLD: 14.6 % — HIGH (ref 10.3–14.5)
SODIUM SERPL-SCNC: 134 MMOL/L — LOW (ref 135–145)
SODIUM SERPL-SCNC: 136 MMOL/L — SIGNIFICANT CHANGE UP (ref 135–145)
VANCOMYCIN TROUGH SERPL-MCNC: 7.8 UG/ML — LOW (ref 10–20)
WBC # BLD: 10.34 K/UL — SIGNIFICANT CHANGE UP (ref 3.8–10.5)
WBC # BLD: 11.29 K/UL — HIGH (ref 3.8–10.5)
WBC # FLD AUTO: 11.29 K/UL — HIGH (ref 3.8–10.5)

## 2025-03-09 PROCEDURE — 11004 DBRDMT SKIN XTRNL GENT&PER: CPT | Mod: 58

## 2025-03-09 RX ORDER — SODIUM CHLORIDE 9 G/1000ML
500 INJECTION, SOLUTION INTRAVENOUS ONCE
Refills: 0 | Status: COMPLETED | OUTPATIENT
Start: 2025-03-09 | End: 2025-03-09

## 2025-03-09 RX ORDER — CLINDAMYCIN PHOSPHATE 150 MG/ML
600 VIAL (ML) INJECTION EVERY 8 HOURS
Refills: 0 | Status: COMPLETED | OUTPATIENT
Start: 2025-03-09 | End: 2025-03-10

## 2025-03-09 RX ORDER — CALCIUM GLUCONATE 20 MG/ML
1 INJECTION, SOLUTION INTRAVENOUS ONCE
Refills: 0 | Status: COMPLETED | OUTPATIENT
Start: 2025-03-09 | End: 2025-03-09

## 2025-03-09 RX ORDER — SODIUM CHLORIDE 9 G/1000ML
1000 INJECTION, SOLUTION INTRAVENOUS
Refills: 0 | Status: DISCONTINUED | OUTPATIENT
Start: 2025-03-09 | End: 2025-03-09

## 2025-03-09 RX ORDER — ENOXAPARIN SODIUM 100 MG/ML
40 INJECTION SUBCUTANEOUS EVERY 24 HOURS
Refills: 0 | Status: DISCONTINUED | OUTPATIENT
Start: 2025-03-09 | End: 2025-03-20

## 2025-03-09 RX ORDER — CALCIUM GLUCONATE 20 MG/ML
2 INJECTION, SOLUTION INTRAVENOUS ONCE
Refills: 0 | Status: DISCONTINUED | OUTPATIENT
Start: 2025-03-09 | End: 2025-03-09

## 2025-03-09 RX ORDER — ACETAMINOPHEN 500 MG/5ML
1000 LIQUID (ML) ORAL EVERY 6 HOURS
Refills: 0 | Status: DISCONTINUED | OUTPATIENT
Start: 2025-03-09 | End: 2025-03-20

## 2025-03-09 RX ORDER — PIPERACILLIN-TAZO-DEXTROSE,ISO 3.375G/5
3.38 IV SOLUTION, PIGGYBACK PREMIX FROZEN(ML) INTRAVENOUS EVERY 8 HOURS
Refills: 0 | Status: DISCONTINUED | OUTPATIENT
Start: 2025-03-09 | End: 2025-03-11

## 2025-03-09 RX ORDER — INSULIN LISPRO 100 U/ML
INJECTION, SOLUTION INTRAVENOUS; SUBCUTANEOUS
Refills: 0 | Status: DISCONTINUED | OUTPATIENT
Start: 2025-03-09 | End: 2025-03-10

## 2025-03-09 RX ORDER — INSULIN GLARGINE-YFGN 100 [IU]/ML
26 INJECTION, SOLUTION SUBCUTANEOUS AT BEDTIME
Refills: 0 | Status: DISCONTINUED | OUTPATIENT
Start: 2025-03-09 | End: 2025-03-12

## 2025-03-09 RX ORDER — SODIUM PHOSPHATE,DIBASIC DIHYD
15 POWDER (GRAM) MISCELLANEOUS ONCE
Refills: 0 | Status: COMPLETED | OUTPATIENT
Start: 2025-03-09 | End: 2025-03-09

## 2025-03-09 RX ORDER — VANCOMYCIN HCL IN 5 % DEXTROSE 1.5G/250ML
1000 PLASTIC BAG, INJECTION (ML) INTRAVENOUS EVERY 12 HOURS
Refills: 0 | Status: DISCONTINUED | OUTPATIENT
Start: 2025-03-09 | End: 2025-03-10

## 2025-03-09 RX ORDER — POLYETHYLENE GLYCOL 3350 17 G/17G
17 POWDER, FOR SOLUTION ORAL DAILY
Refills: 0 | Status: DISCONTINUED | OUTPATIENT
Start: 2025-03-09 | End: 2025-03-14

## 2025-03-09 RX ORDER — SODIUM PHOSPHATE,DIBASIC DIHYD
30 POWDER (GRAM) MISCELLANEOUS ONCE
Refills: 0 | Status: COMPLETED | OUTPATIENT
Start: 2025-03-09 | End: 2025-03-09

## 2025-03-09 RX ORDER — SENNA 187 MG
2 TABLET ORAL AT BEDTIME
Refills: 0 | Status: DISCONTINUED | OUTPATIENT
Start: 2025-03-09 | End: 2025-03-20

## 2025-03-09 RX ORDER — PIPERACILLIN-TAZO-DEXTROSE,ISO 3.375G/5
3.38 IV SOLUTION, PIGGYBACK PREMIX FROZEN(ML) INTRAVENOUS EVERY 8 HOURS
Refills: 0 | Status: DISCONTINUED | OUTPATIENT
Start: 2025-03-09 | End: 2025-03-09

## 2025-03-09 RX ORDER — LIPASE/PROTEASE/AMYLASE 10K-37.5K
1 CAPSULE,DELAYED RELEASE (ENTERIC COATED) ORAL
Refills: 0 | Status: DISCONTINUED | OUTPATIENT
Start: 2025-03-09 | End: 2025-03-20

## 2025-03-09 RX ORDER — OXYCODONE HYDROCHLORIDE 30 MG/1
2.5 TABLET ORAL EVERY 4 HOURS
Refills: 0 | Status: DISCONTINUED | OUTPATIENT
Start: 2025-03-09 | End: 2025-03-16

## 2025-03-09 RX ORDER — INSULIN LISPRO 100 U/ML
INJECTION, SOLUTION INTRAVENOUS; SUBCUTANEOUS AT BEDTIME
Refills: 0 | Status: DISCONTINUED | OUTPATIENT
Start: 2025-03-09 | End: 2025-03-10

## 2025-03-09 RX ADMIN — Medication 100 MILLIEQUIVALENT(S): at 07:24

## 2025-03-09 RX ADMIN — Medication 100 MILLIGRAM(S): at 01:04

## 2025-03-09 RX ADMIN — INSULIN GLARGINE-YFGN 26 UNIT(S): 100 INJECTION, SOLUTION SUBCUTANEOUS at 22:25

## 2025-03-09 RX ADMIN — Medication 25 GRAM(S): at 23:06

## 2025-03-09 RX ADMIN — Medication 100 MILLIGRAM(S): at 17:23

## 2025-03-09 RX ADMIN — Medication 100 MILLIGRAM(S): at 10:46

## 2025-03-09 RX ADMIN — INSULIN LISPRO 2: 100 INJECTION, SOLUTION INTRAVENOUS; SUBCUTANEOUS at 22:25

## 2025-03-09 RX ADMIN — Medication 400 MILLIGRAM(S): at 03:19

## 2025-03-09 RX ADMIN — Medication 1000 MILLIGRAM(S): at 03:49

## 2025-03-09 RX ADMIN — Medication 100 MILLIEQUIVALENT(S): at 08:57

## 2025-03-09 RX ADMIN — Medication 400 MILLIGRAM(S): at 10:45

## 2025-03-09 RX ADMIN — Medication 250 MILLIGRAM(S): at 11:25

## 2025-03-09 RX ADMIN — INSULIN LISPRO 2: 100 INJECTION, SOLUTION INTRAVENOUS; SUBCUTANEOUS at 11:41

## 2025-03-09 RX ADMIN — Medication 255 MILLIMOLE(S): at 07:25

## 2025-03-09 RX ADMIN — Medication 1000 MILLIGRAM(S): at 16:30

## 2025-03-09 RX ADMIN — Medication 100 MILLIEQUIVALENT(S): at 10:12

## 2025-03-09 RX ADMIN — INSULIN LISPRO 2: 100 INJECTION, SOLUTION INTRAVENOUS; SUBCUTANEOUS at 07:39

## 2025-03-09 RX ADMIN — OXYCODONE HYDROCHLORIDE 2.5 MILLIGRAM(S): 30 TABLET ORAL at 15:20

## 2025-03-09 RX ADMIN — Medication 400 MILLIGRAM(S): at 16:15

## 2025-03-09 RX ADMIN — SODIUM CHLORIDE 1000 MILLILITER(S): 9 INJECTION, SOLUTION INTRAVENOUS at 07:24

## 2025-03-09 RX ADMIN — Medication 25 GRAM(S): at 15:00

## 2025-03-09 RX ADMIN — SODIUM CHLORIDE 1500 MILLILITER(S): 9 INJECTION, SOLUTION INTRAVENOUS at 05:34

## 2025-03-09 RX ADMIN — CALCIUM GLUCONATE 100 GRAM(S): 20 INJECTION, SOLUTION INTRAVENOUS at 14:50

## 2025-03-09 RX ADMIN — ENOXAPARIN SODIUM 40 MILLIGRAM(S): 100 INJECTION SUBCUTANEOUS at 15:00

## 2025-03-09 RX ADMIN — Medication 255 MILLIMOLE(S): at 17:25

## 2025-03-09 RX ADMIN — Medication 100 MILLIEQUIVALENT(S): at 17:23

## 2025-03-09 RX ADMIN — OXYCODONE HYDROCHLORIDE 2.5 MILLIGRAM(S): 30 TABLET ORAL at 14:50

## 2025-03-09 RX ADMIN — Medication 25 GRAM(S): at 06:24

## 2025-03-09 RX ADMIN — Medication 1000 MILLIGRAM(S): at 11:00

## 2025-03-09 RX ADMIN — Medication 250 MILLIGRAM(S): at 22:26

## 2025-03-09 RX ADMIN — SODIUM CHLORIDE 50 MILLILITER(S): 9 INJECTION, SOLUTION INTRAVENOUS at 10:18

## 2025-03-09 RX ADMIN — Medication 100 MILLIEQUIVALENT(S): at 16:10

## 2025-03-09 RX ADMIN — Medication 2 TABLET(S): at 22:25

## 2025-03-09 RX ADMIN — Medication 1 APPLICATION(S): at 05:32

## 2025-03-09 RX ADMIN — Medication 20 MILLIEQUIVALENT(S): at 21:21

## 2025-03-09 RX ADMIN — Medication 1 CAPSULE(S): at 16:43

## 2025-03-09 NOTE — BRIEF OPERATIVE NOTE - NSICDXBRIEFPROCEDURE_GEN_ALL_CORE_FT
PROCEDURES:  Irrigation and debridement of perineum and groin 08-Mar-2025 04:35:51  Monse Gomez A  
PROCEDURES:  Scrotal exploration 08-Mar-2025 04:53:56  Bethel Baker  
PROCEDURES:  Irrigation and debridement of perineum and groin 08-Mar-2025 04:35:51  Monse Gomez A

## 2025-03-09 NOTE — PRE PROCEDURE NOTE - ATTENDING COMMENTS
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     Doing well, off pressors  Plan for return to OR today for second look, possible further debridement, possible wound vac placement   R/B/A reviewed. Informed consent obtained     Elina Puga MD  Trauma and Acute Care Surgery

## 2025-03-09 NOTE — PROGRESS NOTE ADULT - SUBJECTIVE AND OBJECTIVE BOX
SURGERY DAILY PROGRESS NOTE    24 Hour/Overnight Events:  - Hypotensive - 1L crystalloid, low dose levo started  - OR 3/9, NPO at midnight  - add Lovenox    SUBJECTIVE: Patient seen and evaluated on AM rounds.     ------------------------------------------------------------------------------------------------------------  OBJECTIVE:  Vital Signs Last 24 Hrs  T(C): 36.6 (09 Mar 2025 20:00), Max: 36.6 (09 Mar 2025 20:00)  T(F): 97.9 (09 Mar 2025 20:00), Max: 97.9 (09 Mar 2025 20:00)  HR: 70 (09 Mar 2025 21:00) (60 - 98)  BP: 135/63 (09 Mar 2025 21:00) (85/52 - 138/73)  BP(mean): 91 (09 Mar 2025 21:00) (63 - 97)  RR: 21 (09 Mar 2025 21:00) (10 - 27)  SpO2: 98% (09 Mar 2025 21:00) (95% - 100%)    Parameters below as of 09 Mar 2025 07:00  Patient On (Oxygen Delivery Method): room air      I&O's Detail    08 Mar 2025 06:01  -  09 Mar 2025 07:00  --------------------------------------------------------  IN:    IV PiggyBack: 250 mL    IV PiggyBack: 675 mL    IV PiggyBack: 924.8 mL    Lactated Ringers Bolus: 500 mL    Norepinephrine: 91.2 mL    Oral Fluid: 570 mL  Total IN: 3011 mL    OUT:    Indwelling Catheter - Urethral (mL): 2960 mL  Total OUT: 2960 mL    Total NET: 51 mL      09 Mar 2025 07:01  -  09 Mar 2025 22:06  --------------------------------------------------------  IN:    IV PiggyBack: 175 mL    IV PiggyBack: 900 mL    multiple electrolytes Injection Type 1.: 150 mL    Oral Fluid: 360 mL  Total IN: 1585 mL    OUT:    Indwelling Catheter - Urethral (mL): 2730 mL  Total OUT: 2730 mL    Total NET: -1145 mL          LABS:                        11.2   11.29 )-----------( 174      ( 09 Mar 2025 14:19 )             33.1     03-09    136  |  103  |  10  ----------------------------<  127[H]  3.5   |  22  |  0.71    Ca    7.3[L]      09 Mar 2025 14:19  Phos  2.0     03-09  Mg     2.6     03-09    TPro  5.3[L]  /  Alb  2.4[L]  /  TBili  0.6  /  DBili  0.2  /  AST  29  /  ALT  27  /  AlkPhos  106  03-09    LIVER FUNCTIONS - ( 09 Mar 2025 05:03 )  Alb: 2.4 g/dL / Pro: 5.3 g/dL / ALK PHOS: 106 U/L / ALT: 27 U/L / AST: 29 U/L / GGT: x           PT/INR - ( 09 Mar 2025 14:19 )   PT: 16.7 sec;   INR: 1.46 ratio         PTT - ( 09 Mar 2025 14:19 )  PTT:32.0 sec  Urinalysis Basic - ( 09 Mar 2025 14:19 )    Color: x / Appearance: x / SG: x / pH: x  Gluc: 127 mg/dL / Ketone: x  / Bili: x / Urobili: x   Blood: x / Protein: x / Nitrite: x   Leuk Esterase: x / RBC: x / WBC x   Sq Epi: x / Non Sq Epi: x / Bacteria: x      EXAM    General: NAD, resting in bed comfortably.  Cardiac: regular rate, warm and well perfused  Respiratory: Nonlabored respirations, normal cw expansion.  Abdomen: soft, nontender, nondistended  : dressing c/d/i  Extremities: normal strength, FROM, no deformities

## 2025-03-09 NOTE — PATIENT PROFILE ADULT - FALL HARM RISK - HARM RISK INTERVENTIONS

## 2025-03-09 NOTE — PROGRESS NOTE ADULT - ASSESSMENT
59M PMH DM, pancreatic cancer, s/p whipple in [facility] presents with scrotal and perineal swelling c/f Fornier's gangrene. The patient is now s/p I&D of Perineum and Groin, and Scrotal Exploration with ACS/Trauma and Urology now s/p Irrigation and debridement of perineum and groin     Recommendations  - DVT ppx: Lvx  - Diet: CC  - IV abx - vanc, zosyn, clinda  - pain control PRN   - appreciate excellent care of SICU    ACS/Trauma Surgery  [id number]

## 2025-03-09 NOTE — BRIEF OPERATIVE NOTE - NSICDXBRIEFPREOP_GEN_ALL_CORE_FT
PRE-OP DIAGNOSIS:  Fourniers gangrene 08-Mar-2025 04:36:06  Monse Gomez A  
PRE-OP DIAGNOSIS:  Fourniers gangrene 08-Mar-2025 04:36:06  Monse Gomez A

## 2025-03-09 NOTE — PROGRESS NOTE ADULT - SUBJECTIVE AND OBJECTIVE BOX
24 HOUR EVENTS:  - Hypotensive - 1L crystalloid, low dose levo started  - OR 3/9, NPO at midnight  - add Lovenox      NEURO  Exam: A&O x 4  Meds: acetaminophen   IVPB .. 1000 milliGRAM(s) IV Intermittent every 6 hours PRN Mild Pain (1 - 3)  oxyCODONE    IR 2.5 milliGRAM(s) Oral every 4 hours PRN Moderate Pain (4 - 6)      RESPIRATORY  RR: 19 (03-09-25 @ 00:45) (11 - 39)  SpO2: 98% (03-09-25 @ 00:45) (96% - 100%)  Exam: unlabored    CARDIOVASCULAR  HR: 86 (03-09-25 @ 00:45) (70 - 108)  BP: 90/51 (03-09-25 @ 00:45) (78/50 - 138/62)  BP(mean): 65 (03-09-25 @ 00:45) (59 - 89)      Exam:   Cardiac Rhythm:   Perfusion     [x ]Adequate   [ ]Inadequate  Mentation   [ x]Normal       [ ]Reduced  Extremities  [x ]Warm         [ ]Cool  Volume Status [ ]Hypervolemic [x]Euvolemic [ ]Hypovolemic  Meds: norepinephrine Infusion 0.05 MICROgram(s)/kG/Min IV Continuous <Continuous>      GI/NUTRITION  Exam: soft, NT/ND  Diet: NPO  Meds: pancrelipase  (CREON 12,000 Lipase Units) 1 Capsule(s) Oral three times a day with meals  polyethylene glycol 3350 17 Gram(s) Oral daily  senna 2 Tablet(s) Oral at bedtime      GENITOURINARY  I&O's Detail    03-07 @ 07:01  -  03-08 @ 07:00  --------------------------------------------------------  IN:    IV PiggyBack: 25 mL    Lactated Ringers Bolus: 1000 mL    Oral Fluid: 150 mL  Total IN: 1175 mL    OUT:    Indwelling Catheter - Urethral (mL): 165 mL  Total OUT: 165 mL    Total NET: 1010 mL      03-08 @ 06:01  -  03-09 @ 01:02  --------------------------------------------------------  IN:    IV PiggyBack: 799.8 mL    IV PiggyBack: 525 mL    Norepinephrine: 91.2 mL    Oral Fluid: 420 mL  Total IN: 1836 mL    OUT:    Indwelling Catheter - Urethral (mL): 1800 mL  Total OUT: 1800 mL    Total NET: 36 mL        Weight (kg): 64 (03-08 @ 02:28)  03-08    139  |  102  |  10  ----------------------------<  224[H]  3.9   |  23  |  0.95    Ca    7.6[L]      08 Mar 2025 17:11  Phos  4.3     03-08  Mg     1.9     03-08    TPro  6.3  /  Alb  3.0[L]  /  TBili  0.8  /  DBili  x   /  AST  32  /  ALT  34  /  AlkPhos  124[H]  03-07    Meds:     HEMATOLOGIC  Meds: enoxaparin Injectable 40 milliGRAM(s) SubCutaneous every 24 hours                          12.1   10.72 )-----------( 159      ( 08 Mar 2025 05:35 )             35.9     PT/INR - ( 07 Mar 2025 20:55 )   PT: 15.7 sec;   INR: 1.37 ratio         PTT - ( 07 Mar 2025 20:55 )  PTT:31.5 sec    INFECTIOUS DISEASES  T(C): 36.7 (03-08-25 @ 22:00), Max: 37.1 (03-08-25 @ 04:42)  Wt(kg): --  WBC Count: 10.72 K/uL (03-08 @ 05:35)    Recent Cultures:  Specimen Source: Tissue Perineal Wound Culture (Tissue), 03-08 @ 05:59; Results --; Gram Stain:   Few polymorphonuclear leukocytes per low power field  Moderate Gram Negative Rods per oil power field  Moderate Gram Positive Rods per oil power field  Moderate Gram positive cocci in pairs per oil power field[!]; Organism: --  Specimen Source: Clean Catch Clean Catch (Midstream), 03-07 @ 23:48; Results   No growth; Gram Stain: --; Organism: --  Specimen Source: Clean Catch Clean Catch (Midstream), 03-03 @ 04:28; Results   <10,000 CFU/mL Normal Urogenital Shannon; Gram Stain: --; Organism: --    Meds: clindamycin IVPB 600 milliGRAM(s) IV Intermittent every 8 hours  piperacillin/tazobactam IVPB.. 3.375 Gram(s) IV Intermittent every 8 hours  vancomycin  IVPB 1000 milliGRAM(s) IV Intermittent every 12 hours      ENDOCRINE  Capillary Blood Glucose    Meds: insulin glargine Injectable (LANTUS) 26 Unit(s) SubCutaneous at bedtime  insulin lispro (ADMELOG) corrective regimen sliding scale   SubCutaneous three times a day before meals  insulin lispro (ADMELOG) corrective regimen sliding scale   SubCutaneous at bedtime      OTHER MEDICATIONS:  chlorhexidine 2% Cloths 1 Application(s) Topical <User Schedule>

## 2025-03-09 NOTE — BRIEF OPERATIVE NOTE - NSICDXBRIEFPOSTOP_GEN_ALL_CORE_FT
POST-OP DIAGNOSIS:  Fourniers gangrene 08-Mar-2025 04:36:09  Monse Gomez A  
POST-OP DIAGNOSIS:  Fourniers gangrene 08-Mar-2025 04:36:09  Monse Gomez A

## 2025-03-09 NOTE — PROGRESS NOTE ADULT - ASSESSMENT
59y Male with PMHx pancreatic ca s/p chemo 2017, DM, presents to ED with rectal pain. 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 Patient had been seen by a colorectal surgeon Dr Johnson in the office on Thur (3/6), s/p fistula I&D outpt, 14 days Augmentin was prescribed  (took 3 pills). CT today showed "Inflammation and subcutaneous emphysema involving the scrotal sac, perineal region, perianal region, right penile shaft, and space between the prostate gland and the left obturator internus."   In ED, patient AVSS,  UA negative, most recent Urine culture on 3/3/2025, no growth. WBC 11.5, H/H 11/34, Cr 0.98, lactate 1.6 s/p debridement with general surgery on 3/8/25, minimal necrotic tissue in inferior scrotum with primarily scrotal edema    3/8: scrotum soft, no crepitus or fluctuance, minimal induration at inferior aspect of scrotal wall, no necrotic skin changes, WBC wnl, on 0.09 levo  3/9: s/p RTOR 2nd look debridement by GS, AF, HDS, off pressors, minimal leukocytosis (11.3 from 10.3) post-op. Scrotum without necrotic skin changes or crepitus or fluctuance    Recommendations  - Scrotal support for scrotal skin edema  - Espinoza per primary team  - Continue antibiotics  - Trend WBC and monitor for fevers  - Urology signing off at this time, please reach out with further questions    Seen and d/w Dr. Shadi Wallis [facility]  [address] [place] [id number] [phone]

## 2025-03-09 NOTE — PROGRESS NOTE ADULT - ASSESSMENT
ASSESSMENT: 86 M PMH DM, pancreatic cancer, s/p whipple in [facility], presents s/p irrigation and debridement of perineum and groin iso Pema's gangrene. Admitted to SICU for postop hemodynamic monitoring.      PLAN:    Neuro  - A&O x 4  - pain control w/ tylenol, oxy prn for pain    Respiratory  - room air  -Sp 02>92%  -Titrate O2 as tolerated    Cardiovascular  - pressors: levo  - MAP >65    GI hx pancreatic CA s/p whipple  - consistent carb diet   - home creon w/ meals  - bowel regimen w/ miralax & senna    /Renal  -almonte   -strict I&O    ID Pema's gangrene  - RTOR 3/9  - empiric clindamycin, vanc, zosyn  -f/u urine cx, MRSA/MSSA  - tissue cx 3/8 (+) GNR, GPC    Heme  - lovenox for VTE ppx  - SCDs    Endocrine  -home lantus 26u  -ISS   -A1C 9.3    Code Status: full code    Disposition: SICU    Yoana Moya PA-C     Please call [phone] after 6am

## 2025-03-09 NOTE — BRIEF OPERATIVE NOTE - OPERATION/FINDINGS
inferior scrotal exploration with minimal necrotic tissue, mainly scrotal edema noted
Debridement of necrotizing soft tissue infection from right lateral perianal area to scrotum. Purulent, foul smelling drainage and necrotic tissue encountered, debrided to healthy tissue. Scrotal debridement per urology. 
Patient positioned in lithotomy. Perineum irrigated. Small amount of fibrinous tissue debrided. No further purulence noted. Vac placed over wound.

## 2025-03-09 NOTE — PROGRESS NOTE ADULT - SUBJECTIVE AND OBJECTIVE BOX
Subjective:  Resting comfortably    Objective:    Vital signs  T(C): , Max: 36.8 (03-08-25 @ 19:00)  HR: 72 (03-09-25 @ 14:00)  BP: 119/66 (03-09-25 @ 14:00)  SpO2: 100% (03-09-25 @ 14:00)  Wt(kg): --    Output     03-08 @ 06:01  -  03-09 @ 07:00  --------------------------------------------------------  IN: 3011 mL / OUT: 2960 mL / NET: 51 mL    03-09 @ 07:01  -  03-09 @ 15:15  --------------------------------------------------------  IN: 825 mL / OUT: 1080 mL / NET: -255 mL        Gen: NAD  Abd: soft, nontender, minimally distended  : almonte secured in place, draining CYU, scrotum without necrotic skin changes or crepitus or fluctuance, appropriately tender to palpation  MSK: left thigh vac    Labs                        11.2   11.29 )-----------( 174      ( 09 Mar 2025 14:19 )             33.1     09 Mar 2025 14:19    136    |  103    |  10     ----------------------------<  127    3.5     |  22     |  0.71     Ca    7.3        09 Mar 2025 14:19  Phos  2.0       09 Mar 2025 14:19  Mg     2.6       09 Mar 2025 14:19        Urine Cx: ?  Blood Cx: ?      Imaging

## 2025-03-10 DIAGNOSIS — N49.3 FOURNIER GANGRENE: ICD-10-CM

## 2025-03-10 DIAGNOSIS — E11.9 TYPE 2 DIABETES MELLITUS WITHOUT COMPLICATIONS: ICD-10-CM

## 2025-03-10 DIAGNOSIS — I10 ESSENTIAL (PRIMARY) HYPERTENSION: ICD-10-CM

## 2025-03-10 DIAGNOSIS — Z85.07 PERSONAL HISTORY OF MALIGNANT NEOPLASM OF PANCREAS: ICD-10-CM

## 2025-03-10 DIAGNOSIS — E78.5 HYPERLIPIDEMIA, UNSPECIFIED: ICD-10-CM

## 2025-03-10 DIAGNOSIS — Z29.9 ENCOUNTER FOR PROPHYLACTIC MEASURES, UNSPECIFIED: ICD-10-CM

## 2025-03-10 LAB
-  AMOXICILLIN/CLAVULANIC ACID: SIGNIFICANT CHANGE UP
-  AMPICILLIN/SULBACTAM: SIGNIFICANT CHANGE UP
-  AMPICILLIN: SIGNIFICANT CHANGE UP
-  AZTREONAM: SIGNIFICANT CHANGE UP
-  CEFAZOLIN: SIGNIFICANT CHANGE UP
-  CEFEPIME: SIGNIFICANT CHANGE UP
-  CEFOXITIN: SIGNIFICANT CHANGE UP
-  CEFTRIAXONE: SIGNIFICANT CHANGE UP
-  CIPROFLOXACIN: SIGNIFICANT CHANGE UP
-  ERTAPENEM: SIGNIFICANT CHANGE UP
-  GENTAMICIN: SIGNIFICANT CHANGE UP
-  IMIPENEM: SIGNIFICANT CHANGE UP
-  IMIPENEM: SIGNIFICANT CHANGE UP
-  LEVOFLOXACIN: SIGNIFICANT CHANGE UP
-  LEVOFLOXACIN: SIGNIFICANT CHANGE UP
-  MEROPENEM: SIGNIFICANT CHANGE UP
-  PIPERACILLIN/TAZOBACTAM: SIGNIFICANT CHANGE UP
-  TOBRAMYCIN: SIGNIFICANT CHANGE UP
-  TRIMETHOPRIM/SULFAMETHOXAZOLE: SIGNIFICANT CHANGE UP
ANION GAP SERPL CALC-SCNC: 12 MMOL/L — SIGNIFICANT CHANGE UP (ref 5–17)
BUN SERPL-MCNC: 17 MG/DL — SIGNIFICANT CHANGE UP (ref 7–23)
CALCIUM SERPL-MCNC: 8 MG/DL — LOW (ref 8.4–10.5)
CHLORIDE SERPL-SCNC: 102 MMOL/L — SIGNIFICANT CHANGE UP (ref 96–108)
CO2 SERPL-SCNC: 23 MMOL/L — SIGNIFICANT CHANGE UP (ref 22–31)
CREAT SERPL-MCNC: 0.83 MG/DL — SIGNIFICANT CHANGE UP (ref 0.5–1.3)
CULTURE RESULTS: SIGNIFICANT CHANGE UP
EGFR: 101 ML/MIN/1.73M2 — SIGNIFICANT CHANGE UP
EGFR: 101 ML/MIN/1.73M2 — SIGNIFICANT CHANGE UP
GLUCOSE BLDC GLUCOMTR-MCNC: 210 MG/DL — HIGH (ref 70–99)
GLUCOSE BLDC GLUCOMTR-MCNC: 219 MG/DL — HIGH (ref 70–99)
GLUCOSE BLDC GLUCOMTR-MCNC: 246 MG/DL — HIGH (ref 70–99)
GLUCOSE BLDC GLUCOMTR-MCNC: 251 MG/DL — HIGH (ref 70–99)
GLUCOSE BLDC GLUCOMTR-MCNC: 273 MG/DL — HIGH (ref 70–99)
GLUCOSE BLDC GLUCOMTR-MCNC: 284 MG/DL — HIGH (ref 70–99)
GLUCOSE SERPL-MCNC: 304 MG/DL — HIGH (ref 70–99)
HCT VFR BLD CALC: 36 % — LOW (ref 39–50)
HGB BLD-MCNC: 12.1 G/DL — LOW (ref 13–17)
MAGNESIUM SERPL-MCNC: 2.4 MG/DL — SIGNIFICANT CHANGE UP (ref 1.6–2.6)
MCHC RBC-ENTMCNC: 27.2 PG — SIGNIFICANT CHANGE UP (ref 27–34)
MCHC RBC-ENTMCNC: 33.6 G/DL — SIGNIFICANT CHANGE UP (ref 32–36)
MCV RBC AUTO: 80.9 FL — SIGNIFICANT CHANGE UP (ref 80–100)
METHOD TYPE: SIGNIFICANT CHANGE UP
NRBC BLD AUTO-RTO: 0 /100 WBCS — SIGNIFICANT CHANGE UP (ref 0–0)
PHOSPHATE SERPL-MCNC: 1.9 MG/DL — LOW (ref 2.5–4.5)
POTASSIUM SERPL-MCNC: 4.4 MMOL/L — SIGNIFICANT CHANGE UP (ref 3.5–5.3)
RBC # BLD: 4.45 M/UL — SIGNIFICANT CHANGE UP (ref 4.2–5.8)
RBC # FLD: 14.5 % — SIGNIFICANT CHANGE UP (ref 10.3–14.5)
SODIUM SERPL-SCNC: 137 MMOL/L — SIGNIFICANT CHANGE UP (ref 135–145)
VANCOMYCIN TROUGH SERPL-MCNC: <4 UG/ML — LOW (ref 10–20)
WBC # BLD: 17.47 K/UL — HIGH (ref 3.8–10.5)
WBC # FLD AUTO: 17.47 K/UL — HIGH (ref 3.8–10.5)

## 2025-03-10 PROCEDURE — 71045 X-RAY EXAM CHEST 1 VIEW: CPT | Mod: 26

## 2025-03-10 PROCEDURE — 99223 1ST HOSP IP/OBS HIGH 75: CPT

## 2025-03-10 PROCEDURE — 99222 1ST HOSP IP/OBS MODERATE 55: CPT

## 2025-03-10 RX ORDER — SODIUM PHOSPHATE,DIBASIC DIHYD
30 POWDER (GRAM) MISCELLANEOUS ONCE
Refills: 0 | Status: COMPLETED | OUTPATIENT
Start: 2025-03-10 | End: 2025-03-10

## 2025-03-10 RX ORDER — DEXTROSE 50 % IN WATER 50 %
15 SYRINGE (ML) INTRAVENOUS ONCE
Refills: 0 | Status: DISCONTINUED | OUTPATIENT
Start: 2025-03-10 | End: 2025-03-20

## 2025-03-10 RX ORDER — VANCOMYCIN HCL IN 5 % DEXTROSE 1.5G/250ML
1500 PLASTIC BAG, INJECTION (ML) INTRAVENOUS EVERY 12 HOURS
Refills: 0 | Status: DISCONTINUED | OUTPATIENT
Start: 2025-03-11 | End: 2025-03-11

## 2025-03-10 RX ORDER — TERAZOSIN 2 MG/1
1 CAPSULE ORAL
Refills: 0 | DISCHARGE

## 2025-03-10 RX ORDER — POTASSIUM PHOSPHATE, MONOBASIC POTASSIUM PHOSPHATE, DIBASIC INJECTION, 236; 224 MG/ML; MG/ML
30 SOLUTION, CONCENTRATE INTRAVENOUS ONCE
Refills: 0 | Status: DISCONTINUED | OUTPATIENT
Start: 2025-03-10 | End: 2025-03-10

## 2025-03-10 RX ORDER — INSULIN LISPRO 100 U/ML
INJECTION, SOLUTION INTRAVENOUS; SUBCUTANEOUS
Refills: 0 | Status: DISCONTINUED | OUTPATIENT
Start: 2025-03-10 | End: 2025-03-10

## 2025-03-10 RX ORDER — CALCIUM CARBONATE 750 MG/1
0 TABLET ORAL
Refills: 0 | DISCHARGE

## 2025-03-10 RX ORDER — LIPASE/PROTEASE/AMYLASE 10K-37.5K
2 CAPSULE,DELAYED RELEASE (ENTERIC COATED) ORAL
Refills: 0 | DISCHARGE

## 2025-03-10 RX ORDER — VANCOMYCIN HCL IN 5 % DEXTROSE 1.5G/250ML
PLASTIC BAG, INJECTION (ML) INTRAVENOUS
Refills: 0 | Status: DISCONTINUED | OUTPATIENT
Start: 2025-03-10 | End: 2025-03-11

## 2025-03-10 RX ORDER — INSULIN LISPRO 100 U/ML
INJECTION, SOLUTION INTRAVENOUS; SUBCUTANEOUS
Refills: 0 | Status: DISCONTINUED | OUTPATIENT
Start: 2025-03-10 | End: 2025-03-20

## 2025-03-10 RX ORDER — INSULIN LISPRO 100 U/ML
6 INJECTION, SOLUTION INTRAVENOUS; SUBCUTANEOUS
Refills: 0 | Status: DISCONTINUED | OUTPATIENT
Start: 2025-03-10 | End: 2025-03-10

## 2025-03-10 RX ORDER — CYANOCOBALAMIN 1000 UG/ML
1 INJECTION INTRAMUSCULAR; SUBCUTANEOUS
Refills: 0 | DISCHARGE

## 2025-03-10 RX ORDER — INSULIN LISPRO 100 U/ML
8 INJECTION, SOLUTION INTRAVENOUS; SUBCUTANEOUS
Refills: 0 | Status: DISCONTINUED | OUTPATIENT
Start: 2025-03-10 | End: 2025-03-11

## 2025-03-10 RX ORDER — ATORVASTATIN CALCIUM 80 MG/1
1 TABLET, FILM COATED ORAL
Refills: 0 | DISCHARGE

## 2025-03-10 RX ORDER — VANCOMYCIN HCL IN 5 % DEXTROSE 1.5G/250ML
1500 PLASTIC BAG, INJECTION (ML) INTRAVENOUS ONCE
Refills: 0 | Status: COMPLETED | OUTPATIENT
Start: 2025-03-10 | End: 2025-03-10

## 2025-03-10 RX ORDER — INSULIN LISPRO 100 U/ML
INJECTION, SOLUTION INTRAVENOUS; SUBCUTANEOUS AT BEDTIME
Refills: 0 | Status: DISCONTINUED | OUTPATIENT
Start: 2025-03-10 | End: 2025-03-20

## 2025-03-10 RX ADMIN — Medication 100 MILLIGRAM(S): at 04:49

## 2025-03-10 RX ADMIN — Medication 400 MILLIGRAM(S): at 06:19

## 2025-03-10 RX ADMIN — Medication 1 CAPSULE(S): at 18:03

## 2025-03-10 RX ADMIN — Medication 1 CAPSULE(S): at 14:26

## 2025-03-10 RX ADMIN — Medication 1000 MILLIGRAM(S): at 06:49

## 2025-03-10 RX ADMIN — INSULIN LISPRO 4: 100 INJECTION, SOLUTION INTRAVENOUS; SUBCUTANEOUS at 10:22

## 2025-03-10 RX ADMIN — Medication 85 MILLIMOLE(S): at 21:43

## 2025-03-10 RX ADMIN — Medication 1 CAPSULE(S): at 13:04

## 2025-03-10 RX ADMIN — OXYCODONE HYDROCHLORIDE 2.5 MILLIGRAM(S): 30 TABLET ORAL at 21:57

## 2025-03-10 RX ADMIN — ENOXAPARIN SODIUM 40 MILLIGRAM(S): 100 INJECTION SUBCUTANEOUS at 14:35

## 2025-03-10 RX ADMIN — Medication 250 MILLIGRAM(S): at 17:47

## 2025-03-10 RX ADMIN — OXYCODONE HYDROCHLORIDE 2.5 MILLIGRAM(S): 30 TABLET ORAL at 22:30

## 2025-03-10 RX ADMIN — INSULIN LISPRO 6: 100 INJECTION, SOLUTION INTRAVENOUS; SUBCUTANEOUS at 14:18

## 2025-03-10 RX ADMIN — Medication 25 GRAM(S): at 17:29

## 2025-03-10 RX ADMIN — Medication 25 GRAM(S): at 06:20

## 2025-03-10 RX ADMIN — INSULIN LISPRO 1: 100 INJECTION, SOLUTION INTRAVENOUS; SUBCUTANEOUS at 21:58

## 2025-03-10 RX ADMIN — Medication 1 APPLICATION(S): at 18:04

## 2025-03-10 RX ADMIN — INSULIN LISPRO 2: 100 INJECTION, SOLUTION INTRAVENOUS; SUBCUTANEOUS at 19:48

## 2025-03-10 RX ADMIN — INSULIN LISPRO 8 UNIT(S): 100 INJECTION, SOLUTION INTRAVENOUS; SUBCUTANEOUS at 19:40

## 2025-03-10 RX ADMIN — INSULIN GLARGINE-YFGN 26 UNIT(S): 100 INJECTION, SOLUTION SUBCUTANEOUS at 21:57

## 2025-03-10 RX ADMIN — Medication 300 MILLIGRAM(S): at 22:42

## 2025-03-10 RX ADMIN — Medication 400 MILLIGRAM(S): at 17:35

## 2025-03-10 NOTE — CONSULT NOTE ADULT - PROBLEM SELECTOR RECOMMENDATION 2
a1c 9.3  pt takes long acting 26units and 10-12units pre-meal TID  - currently on lantus 26units  - recommend adding 6units admelog pre-meal for now  - s/w SSI   - resume home lipitor 20mg

## 2025-03-10 NOTE — PROGRESS NOTE ADULT - ASSESSMENT
59M PMH DM, pancreatic cancer, s/p whipple in [facility] presents with scrotal and perineal swelling c/f Fornier's gangrene. The patient is now s/p I&D of Perineum and Groin, and Scrotal Exploration with ACS/Trauma and Urology now s/p Irrigation and debridement of perineum and groin     Recommendations  - Wound Vac (Change Wednesday in OR)   - Espinoza out. TOV  - DVT ppx: Lvx  - Diet: CC  - IV abx - vanc, zosyn, clinda  - pain control PRN       ACS/Trauma Surgery  [id number]

## 2025-03-10 NOTE — CONSULT NOTE ADULT - PROBLEM SELECTOR RECOMMENDATION 4
VTE ppx - lovenox   Diet - CC diet    Med management:  resume home med terazosin 1mg (or inpt equivalent)

## 2025-03-10 NOTE — CHART NOTE - NSCHARTNOTEFT_GEN_A_CORE
SICU Transfer Note    Transfer from: SICU  Transfer to: [facility]   Accepting physician: Dr. Wallis     SICU COURSE:  59M PMH DM, pancreatic cancer, s/p whipple in [facility] presents with scrotal and perineal swelling c/f Fornier's gangrene. The patient is now s/p I&D of Perineum and Groin, and Scrotal Exploration with ACS/Trauma and Urology now s/p Irrigation and debridement of perineum and groin     ASSESSMENT & PLAN:     Recommendations  - DVT ppx: Lvx  - CC w evening snacks   - wound vac to suction   - Diet: CC  - IV abx - vanc, zosyn, clinda  - pain control PRN     ACS/Trauma Surgery  [id number]      Vital Signs Last 24 Hrs  T(C): 37.1 (10 Mar 2025 00:14), Max: 37.1 (10 Mar 2025 00:14)  T(F): 98.7 (10 Mar 2025 00:14), Max: 98.7 (10 Mar 2025 00:14)  HR: 84 (10 Mar 2025 00:14) (60 - 92)  BP: 116/70 (10 Mar 2025 00:14) (85/52 - 138/73)  BP(mean): 90 (09 Mar 2025 23:00) (63 - 97)  RR: 17 (10 Mar 2025 00:14) (10 - 27)  SpO2: 97% (10 Mar 2025 00:14) (95% - 100%)    Parameters below as of 10 Mar 2025 00:14  Patient On (Oxygen Delivery Method): room air      I&O's Summary    08 Mar 2025 06:01  -  09 Mar 2025 07:00  --------------------------------------------------------  IN: 3011 mL / OUT: 2960 mL / NET: 51 mL    09 Mar 2025 07:01  -  10 Mar 2025 00:59  --------------------------------------------------------  IN: 1935 mL / OUT: 3080 mL / NET: -1145 mL          MEDICATIONS  (STANDING):  chlorhexidine 2% Cloths 1 Application(s) Topical <User Schedule>  clindamycin IVPB 600 milliGRAM(s) IV Intermittent every 8 hours  enoxaparin Injectable 40 milliGRAM(s) SubCutaneous every 24 hours  insulin glargine Injectable (LANTUS) 26 Unit(s) SubCutaneous at bedtime  insulin lispro (ADMELOG) corrective regimen sliding scale   SubCutaneous three times a day before meals  insulin lispro (ADMELOG) corrective regimen sliding scale   SubCutaneous at bedtime  pancrelipase  (CREON 12,000 Lipase Units) 1 Capsule(s) Oral three times a day with meals  piperacillin/tazobactam IVPB.. 3.375 Gram(s) IV Intermittent every 8 hours  polyethylene glycol 3350 17 Gram(s) Oral daily  senna 2 Tablet(s) Oral at bedtime  vancomycin  IVPB 1000 milliGRAM(s) IV Intermittent every 12 hours    MEDICATIONS  (PRN):  acetaminophen   IVPB .. 1000 milliGRAM(s) IV Intermittent every 6 hours PRN Mild Pain (1 - 3)  oxyCODONE    IR 2.5 milliGRAM(s) Oral every 4 hours PRN Moderate Pain (4 - 6)        LABS                                            11.2                  Neurophils% (auto):   x      (03-09 @ 14:19):    11.29)-----------(174          Lymphocytes% (auto):  x                                             33.1                   Eosinphils% (auto):   x        Manual%: Neutrophils x    ; Lymphocytes x    ; Eosinophils x    ; Bands%: x    ; Blasts x                                    136    |  103    |  10                  Calcium: 7.3   / iCa: x      (03-09 @ 14:19)    ----------------------------<  127       Magnesium: 2.6                              3.5     |  22     |  0.71             Phosphorous: 2.0      TPro  5.3    /  Alb  2.4    /  TBili  0.6    /  DBili  0.2    /  AST  29     /  ALT  27     /  AlkPhos  106    09 Mar 2025 05:03    ( 03-09 @ 14:19 )   PT: 16.7 sec;   INR: 1.46 ratio  aPTT: 32.0 sec

## 2025-03-10 NOTE — PROGRESS NOTE ADULT - SUBJECTIVE AND OBJECTIVE BOX
SURGERY DAILY PROGRESS NOTE    Overnight Events: No acute events overnight    SUBJECTIVE: Patient seen and evaluated on AM rounds. Pt is resting comfortably in bed with no complaints. Passing gas and having bowel movements. Tolerating diet and Ambulating well.  Pain is adequately controlled on current regimen.  Denies fevers/chills, chest pain, dyspnea, abdominal pain, nausea, vomiting, and diarrhea    -----------------------------------------------------------------------------------------------------------------------------------------------------------------------------------------------------------  OBJECTIVE:  Vital Signs Last 24 Hrs  T(C): 36.8 (10 Mar 2025 05:33), Max: 37.1 (10 Mar 2025 00:14)  T(F): 98.2 (10 Mar 2025 05:33), Max: 98.7 (10 Mar 2025 00:14)  HR: 67 (10 Mar 2025 05:33) (60 - 84)  BP: 123/75 (10 Mar 2025 05:33) (89/52 - 138/73)  BP(mean): 90 (09 Mar 2025 23:00) (65 - 97)  RR: 18 (10 Mar 2025 05:33) (10 - 22)  SpO2: 95% (10 Mar 2025 05:33) (95% - 100%)    Parameters below as of 10 Mar 2025 05:33  Patient On (Oxygen Delivery Method): room air      I&O's Detail    09 Mar 2025 07:01  -  10 Mar 2025 07:00  --------------------------------------------------------  IN:    IV PiggyBack: 175 mL    IV PiggyBack: 1250 mL    multiple electrolytes Injection Type 1.: 150 mL    Oral Fluid: 580 mL  Total IN: 2155 mL    OUT:    Indwelling Catheter - Urethral (mL): 4130 mL  Total OUT: 4130 mL    Total NET: -1975 mL        Daily     Daily   MEDICATIONS  (STANDING):  chlorhexidine 2% Cloths 1 Application(s) Topical <User Schedule>  enoxaparin Injectable 40 milliGRAM(s) SubCutaneous every 24 hours  insulin glargine Injectable (LANTUS) 26 Unit(s) SubCutaneous at bedtime  insulin lispro (ADMELOG) corrective regimen sliding scale   SubCutaneous three times a day before meals  insulin lispro (ADMELOG) corrective regimen sliding scale   SubCutaneous at bedtime  pancrelipase  (CREON 12,000 Lipase Units) 1 Capsule(s) Oral three times a day with meals  piperacillin/tazobactam IVPB.. 3.375 Gram(s) IV Intermittent every 8 hours  polyethylene glycol 3350 17 Gram(s) Oral daily  senna 2 Tablet(s) Oral at bedtime  vancomycin  IVPB 1000 milliGRAM(s) IV Intermittent every 12 hours    MEDICATIONS  (PRN):  acetaminophen   IVPB .. 1000 milliGRAM(s) IV Intermittent every 6 hours PRN Mild Pain (1 - 3)  oxyCODONE    IR 2.5 milliGRAM(s) Oral every 4 hours PRN Moderate Pain (4 - 6)      LABS:                        11.2   11.29 )-----------( 174      ( 09 Mar 2025 14:19 )             33.1     03-09    136  |  103  |  10  ----------------------------<  127[H]  3.5   |  22  |  0.71    Ca    7.3[L]      09 Mar 2025 14:19  Phos  2.0     03-09  Mg     2.6     03-09    TPro  5.3[L]  /  Alb  2.4[L]  /  TBili  0.6  /  DBili  0.2  /  AST  29  /  ALT  27  /  AlkPhos  106  03-09    PT/INR - ( 09 Mar 2025 14:19 )   PT: 16.7 sec;   INR: 1.46 ratio         PTT - ( 09 Mar 2025 14:19 )  PTT:32.0 sec  Urinalysis Basic - ( 09 Mar 2025 14:19 )    Color: x / Appearance: x / SG: x / pH: x  Gluc: 127 mg/dL / Ketone: x  / Bili: x / Urobili: x   Blood: x / Protein: x / Nitrite: x   Leuk Esterase: x / RBC: x / WBC x   Sq Epi: x / Non Sq Epi: x / Bacteria: x                EXAM    General: NAD, resting in bed comfortably.  Cardiac: regular rate, warm and well perfused  Respiratory: Nonlabored respirations, normal cw expansion.  Abdomen: soft, nontender, nondistended  : Wound Vac, Espinoza   Extremities: normal strength, FROM, no deformities

## 2025-03-10 NOTE — CONSULT NOTE ADULT - PROBLEM SELECTOR RECOMMENDATION 9
a/w Pema's gangrene of the perineum/scrotum   - s/p I+D and exploration 3/8  - s/p irrigation and debridement 3/9  - wound vac in place  - pain control per primary   - abx per primary team - consider ID consult

## 2025-03-10 NOTE — CONSULT NOTE ADULT - SUBJECTIVE AND OBJECTIVE BOX
Patient is a 59y old  Male who presents with a chief complaint of Hemodynamic monitoring (08 Mar 2025 05:02)      INTERVAL HPI/OVERNIGHT EVENTS:  HPI:  HPI:  59M PMH DM, pancreatic cancer, s/p whipple in [facility], presents with perirectal pain. Patient states he had perirectal pain, and an abscess was drained at an outpt office and he was sent home on PO antibiotics. The patient noticed worsening scrotal swelling, pain so he presented to the ED. 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    (08 Mar 2025 01:54)    Pt feeling well this AM, having some perineal pain, was able to have a BM early this afternoon, urinating ok, no abd pain, tolerating diet, no n/v, no CP or SOB .  Rest of ROS negative except as noted above       PAST MEDICAL & SURGICAL HISTORY:  Pancreatic cancer  DM (diabetes mellitus)  No significant past surgical history      Allergies  No Known Allergies  Intolerances    FAMILY HISTORY:  no family history of pancreatic cancer       Social History:  no current smoking, etoh   independent with ADLS     MEDICATIONS  (STANDING):  chlorhexidine 2% Cloths 1 Application(s) Topical <User Schedule>  enoxaparin Injectable 40 milliGRAM(s) SubCutaneous every 24 hours  insulin glargine Injectable (LANTUS) 26 Unit(s) SubCutaneous at bedtime  insulin lispro (ADMELOG) corrective regimen sliding scale   SubCutaneous three times a day before meals  insulin lispro (ADMELOG) corrective regimen sliding scale   SubCutaneous at bedtime  pancrelipase  (CREON 12,000 Lipase Units) 1 Capsule(s) Oral three times a day with meals  piperacillin/tazobactam IVPB.. 3.375 Gram(s) IV Intermittent every 8 hours  polyethylene glycol 3350 17 Gram(s) Oral daily  senna 2 Tablet(s) Oral at bedtime  sodium phosphate 30 milliMole(s)/500 mL IVPB 30 milliMole(s) IV Intermittent once  vancomycin  IVPB 1000 milliGRAM(s) IV Intermittent every 12 hours    MEDICATIONS  (PRN):  acetaminophen   IVPB .. 1000 milliGRAM(s) IV Intermittent every 6 hours PRN Mild Pain (1 - 3)  oxyCODONE    IR 2.5 milliGRAM(s) Oral every 4 hours PRN Moderate Pain (4 - 6)      Vital Signs Last 24 Hrs  T(C): 37 (10 Mar 2025 13:39), Max: 37.1 (10 Mar 2025 00:14)  T(F): 98.6 (10 Mar 2025 13:39), Max: 98.7 (10 Mar 2025 00:14)  HR: 69 (10 Mar 2025 13:39) (61 - 84)  BP: 128/81 (10 Mar 2025 13:39) (113/58 - 138/73)  BP(mean): 90 (09 Mar 2025 23:00) (81 - 97)  RR: 18 (10 Mar 2025 13:39) (17 - 22)  SpO2: 97% (10 Mar 2025 13:39) (95% - 99%)    Parameters below as of 10 Mar 2025 13:39  Patient On (Oxygen Delivery Method): room air        PHYSICAL EXAM:  GENERAL: NAD, well-developed  HEAD:  Atraumatic, normocephalic  EYES: EOMI, conjunctiva and sclera clear  NECK: Supple, no JVD  CHEST/LUNG: Clear to auscultation bilaterally; no wheezing or rales  HEART: Regular rate and rhythm; no murmurs, no LE edema   ABDOMEN: Soft, nontender, nondistended; bowel sounds present  EXTREMITIES:  2+ Peripheral Pulses, no clubbing, cyanosis  PSYCH: AAOx3, calm affect, not anxious  SKIN: No rashes or lesions      LABS:                        12.1   17.47 )-----------( 210      ( 10 Mar 2025 13:16 )             36.0     03-10    137  |  102  |  17  ----------------------------<  304[H]  4.4   |  23  |  0.83    Ca    8.0[L]      10 Mar 2025 13:16  Phos  1.9     03-10  Mg     2.4     03-10    TPro  5.3[L]  /  Alb  2.4[L]  /  TBili  0.6  /  DBili  0.2  /  AST  29  /  ALT  27  /  AlkPhos  106  03-09    PT/INR - ( 09 Mar 2025 14:19 )   PT: 16.7 sec;   INR: 1.46 ratio         PTT - ( 09 Mar 2025 14:19 )  PTT:32.0 sec      I&O's Summary    09 Mar 2025 07:01  -  10 Mar 2025 07:00  --------------------------------------------------------  IN: 2155 mL / OUT: 4130 mL / NET: -1975 mL    10 Mar 2025 07:01  -  10 Mar 2025 14:47  --------------------------------------------------------  IN: 560 mL / OUT: 550 mL / NET: 10 mL        RADIOLOGY & ADDITIONAL TESTS:    Imaging Personally Reviewed:  [ ] YES  [ ] NO    Consultant(s) Notes Reviewed:  [ ] YES  [ ] NO    Care Discussed with Consultants/Other Providers [ ] YES  [ ] NO

## 2025-03-10 NOTE — CONSULT NOTE ADULT - SUBJECTIVE AND OBJECTIVE BOX
HPI: 59yMale with hx of T2DM, HTN, HLD, pancreatic adenocarcinoma s/p whipple (2017), presents with scrotal and perineal swelling. 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 Patient reports adequate appetite. His BG trends up with improved appetite, now to low-mid 200's. Denies acute complaints.    Endocrinology consulted for diabetes management.     Diabetes history: Dx'd with T2DM on 2016. HbA1c is 9.3%. At home, on lantus 26 units and lispro 10-12 units premeals. Pt follows with endocrinologist Dr. Panda at [place].     Microvascular Complications: Denies    Macrovascular Complications: Denies CVA or CAD    FH: Denies    SH:  Smoking: denies  Etoh: denies  Recreational Drugs: denies    PAST MEDICAL & SURGICAL HISTORY:  Pancreatic cancer      DM (diabetes mellitus)      No significant past surgical history              Current Meds:  acetaminophen   IVPB .. 1000 milliGRAM(s) IV Intermittent every 6 hours PRN  chlorhexidine 2% Cloths 1 Application(s) Topical <User Schedule>  dextrose Oral Gel 15 Gram(s) Oral once PRN  enoxaparin Injectable 40 milliGRAM(s) SubCutaneous every 24 hours  insulin glargine Injectable (LANTUS) 26 Unit(s) SubCutaneous at bedtime  insulin lispro (ADMELOG) corrective regimen sliding scale   SubCutaneous at bedtime  insulin lispro (ADMELOG) corrective regimen sliding scale   SubCutaneous three times a day before meals  insulin lispro Injectable (ADMELOG) 8 Unit(s) SubCutaneous three times a day before meals  oxyCODONE    IR 2.5 milliGRAM(s) Oral every 4 hours PRN  pancrelipase  (CREON 12,000 Lipase Units) 1 Capsule(s) Oral three times a day with meals  piperacillin/tazobactam IVPB.. 3.375 Gram(s) IV Intermittent every 8 hours  polyethylene glycol 3350 17 Gram(s) Oral daily  senna 2 Tablet(s) Oral at bedtime  sodium phosphate 30 milliMole(s)/500 mL IVPB 30 milliMole(s) IV Intermittent once  vancomycin  IVPB 1000 milliGRAM(s) IV Intermittent every 12 hours      Allergies:  No Known Allergies      ROS:     Constitutional: No fever, good appetite/po intake  Eyes: No blurry vision, diplopia  Neuro: No tremors  HEENT: No pain  Cardiovascular: No chest pain, palpitations  Respiratory: No SOB, no cough  GI: No nausea, vomiting,   : No dysuria, hematuria  Skin: no rash  Psych: no depression  Endocrine: no polyuria, polydipsia  Hem/lymph: no swelling  Osteoporosis: no fractures     Vital Signs Last 24 Hrs  T(C): 37 (10 Mar 2025 13:39), Max: 37.1 (10 Mar 2025 00:14)  T(F): 98.6 (10 Mar 2025 13:39), Max: 98.7 (10 Mar 2025 00:14)  HR: 69 (10 Mar 2025 13:39) (61 - 84)  BP: 128/81 (10 Mar 2025 13:39) (113/58 - 138/73)  BP(mean): 90 (09 Mar 2025 23:00) (81 - 97)  RR: 18 (10 Mar 2025 13:39) (17 - 22)  SpO2: 97% (10 Mar 2025 13:39) (95% - 99%)    Parameters below as of 10 Mar 2025 13:39  Patient On (Oxygen Delivery Method): room air      Height (cm): 167.6 (03-08 @ 02:28)  Weight (kg): 64 (03-08 @ 02:28)  BMI (kg/m2): 22.8 (03-08 @ 02:28)    VITALS: T(C): 37 (03-10-25 @ 13:39)  T(F): 98.6 (03-10-25 @ 13:39), Max: 98.7 (03-10-25 @ 00:14)  HR: 69 (03-10-25 @ 13:39) (61 - 84)  BP: 128/81 (03-10-25 @ 13:39) (113/58 - 138/73)  RR:  (17 - 22)  SpO2:  (95% - 99%)  Wt(kg): --  GENERAL: NAD, well-groomed, well-developed  EYES: No proptosis, no lid lag, anicteric, extraocular movements intact  HEENT:  Atraumatic, Normocephalic, moist mucous membranes  THYROID: Normal size, no palpable nodules, no thyromegaly  RESPIRATORY: non labored breathing, no accessory muscle use  CARDIOVASCULAR: non tachycardic, no peripheral edema  GI: Soft, nontender, non distended   SKIN: Dry, intact, No rashes or lesions  NEURO: sensation intact, no tremor  EXTREMITIES: no foot ulcers and bilateral distal pedal pulses intact  PSYCH: reactive affect, euthymic mood      LABS:                        12.1   17.47 )-----------( 210      ( 10 Mar 2025 13:16 )             36.0     03-10    137  |  102  |  17  ----------------------------<  304[H]  4.4   |  23  |  0.83    Ca    8.0[L]      10 Mar 2025 13:16  Phos  1.9     03-10  Mg     2.4     03-10    TPro  5.3[L]  /  Alb  2.4[L]  /  TBili  0.6  /  DBili  0.2  /  AST  29  /  ALT  27  /  AlkPhos  106  03-09    PT/INR - ( 09 Mar 2025 14:19 )   PT: 16.7 sec;   INR: 1.46 ratio         PTT - ( 09 Mar 2025 14:19 )  PTT:32.0 sec  Urinalysis Basic - ( 10 Mar 2025 13:16 )    Color: x / Appearance: x / SG: x / pH: x  Gluc: 304 mg/dL / Ketone: x  / Bili: x / Urobili: x   Blood: x / Protein: x / Nitrite: x   Leuk Esterase: x / RBC: x / WBC x   Sq Epi: x / Non Sq Epi: x / Bacteria: x        Thyroid Stimulating Hormone, Serum: 3.90 (03-03 @ 00:37)      RADIOLOGY & ADDITIONAL STUDIES:  CAPILLARY BLOOD GLUCOSE      POCT Blood Glucose.: 284 mg/dL (10 Mar 2025 13:52)  POCT Blood Glucose.: 210 mg/dL (10 Mar 2025 09:58)  POCT Blood Glucose.: 219 mg/dL (10 Mar 2025 02:15)  POCT Blood Glucose.: 275 mg/dL (09 Mar 2025 22:22)  POCT Blood Glucose.: 122 mg/dL (09 Mar 2025 16:38)

## 2025-03-10 NOTE — CONSULT NOTE ADULT - ASSESSMENT
59y Male with hx of T2DM, HTN, HLD, pancreatic adenocarcinoma s/p whipple (2017), presents with scrotal and perineal swelling. 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 Endocrine was consulted for T2DM.    # T2DM with hyperglycemia   - Most recent Hemoglobin A1C 9.3%  - At home, on lantus 26 units and lispro 10-12 units premeals  - c-peptide 2.2 on 11/2018- after pt had whipple procedure.  - Current FS ranges from low-mid 200 with improved appetite    Recommendations:  - C/w insulin Glargine 26 units QHS  - Increase insulin Lispro to 8 units TID with meals, hold if NPO or if eating less than 50% of meals  - Start with correctional scale TID with meals  - Check c-peptide tomorrow AM to assess for endogenous insulin production  - Continue with low dose correctional scale QHS  - Please monitor blood glucose values TID AC & QHS while eating regular meals and Q6H while NPO  - Follow hypoglycemia protocol  - Current diet: carb consistent diet     Dispo recs  - Patient to continue with basal bolus insulin regimen. Dosage TBD based on inpatient glycemic requirement  - If c-peptide elevated, may incorporate non-insulin agent in future. However, given poor baseline glycemic control and with fornier's gangrene, would require insulin regimen for now.  - Patient to follow up with endocrinologist Dr. Panda at [place].    # HTN  - BP goal <130/80  - Manage per primary team     # HLD  - Continue with atorvastatin QHS  - Goal LDL<70  - Manage as outpatient       Thank you for the consult. Will continue to monitor. Please inform the endocrinology team at least 24 hours prior to intended discharge date.    Contact via pager or [employer] during business hours. For follow up questions, discharge recommendations, or new consults please call answering service at [phone] (weekdays), [phone] (nights/weekends). For nonurgent matters, please email  [email or web address].      Dakota Duong MD  Attending Physician   Department of Endocrinology, Diabetes and Metabolism   [employer]

## 2025-03-10 NOTE — CONSULT NOTE ADULT - ASSESSMENT
59M PMH DM, pancreatic cancer, s/p whipple in [facility] presents with scrotal and perineal swelling c/f Fornier's gangrene, s/p I&D of perineum, groin and scrotal exploration (3/8), followed by irrigation and debridement of perineum and groin (3/9).

## 2025-03-10 NOTE — CHART NOTE - NSCHARTNOTEFT_GEN_A_CORE
SURGERY POST OP CHECK    STATUS POST PROCEDURE: s/p Irrigation and debridement of perineum and groin    SUBJECTIVE: Pt seen and examined at bedside. Patient reports appropriate surgical incisional pain; pain is controlled. Pt is voiding well. Pt is tolerating diet. Pt is ambulating well    --------------------------------------------------------------------------------------------------------------------------------------------------------------------------------------------------------------  OBJECTIVE:  Vital Signs Last 24 Hrs  T(C): 37.1 (10 Mar 2025 00:14), Max: 37.1 (10 Mar 2025 00:14)  T(F): 98.7 (10 Mar 2025 00:14), Max: 98.7 (10 Mar 2025 00:14)  HR: 84 (10 Mar 2025 00:14) (60 - 92)  BP: 116/70 (10 Mar 2025 00:14) (85/52 - 138/73)  BP(mean): 90 (09 Mar 2025 23:00) (63 - 97)  RR: 17 (10 Mar 2025 00:14) (10 - 27)  SpO2: 97% (10 Mar 2025 00:14) (95% - 100%)    Parameters below as of 10 Mar 2025 00:14  Patient On (Oxygen Delivery Method): room air      I&O's Summary    08 Mar 2025 06:01  -  09 Mar 2025 07:00  --------------------------------------------------------  IN: 3011 mL / OUT: 2960 mL / NET: 51 mL    09 Mar 2025 07:01  -  10 Mar 2025 00:53  --------------------------------------------------------  IN: 1935 mL / OUT: 3080 mL / NET: -1145 mL      EXAM    General: NAD, resting in bed comfortably.  Cardiac: regular rate, warm and well perfused  Respiratory: Nonlabored respirations, normal cw expansion.  Abdomen: soft, nontender, nondistended  : dressing c/d/i. Wound vac on perineum holding suction   Extremities: normal strength, FROM, no deformities  ----------------------------------------------------------------------------------------------------------------------------------------------------------------------------------------------------------------  ASSESSMENT: HPI:  59M PMH DM, pancreatic cancer, s/p whipple in [facility] presents with scrotal and perineal swelling c/f Fornier's gangrene. The patient is now s/p I&D of Perineum and Groin, and Scrotal Exploration with ACS/Trauma and Urology now s/p Irrigation and debridement of perineum and groin     PLAN:   - DVT ppx: Lvx  - Diet: CC  - IV abx - vanc, zosyn, clinda  - pain control PRN   - Now on Floor     ACS/Trauma Surgery  [id number]                Pt is s/p ; POD #0    PLAN:  - ERP Protocol  - Espinoza removed; TOV at   - Diet:  - Antibiotics:  - Analgesia and antiemetics as needed  - Strict I&O's  - Monitor bowel function   - Incentive spirometry  - OOB as tolerated  - DVT prophylaxis: SCD  - Monitor overnight  - Dispo: Discharge home in AM

## 2025-03-10 NOTE — CONSULT NOTE ADULT - TIME BILLING
I saw and evaluated patient and agree with the note as written  This patient presents after undergoing debridement for soft tissue necrotizing wound infection  showing soft blood pressures - essentially hypotensive  prepared to give pressors although showing response to fluid resuscitation  coordinating broad spectrum antibiotics  facilitating therapies for septic hypotension
chart reviewing, history taking, physical exam, assessment and documentation, including speaking to specialists regarding the management.

## 2025-03-11 LAB
ANION GAP SERPL CALC-SCNC: 12 MMOL/L — SIGNIFICANT CHANGE UP (ref 5–17)
BUN SERPL-MCNC: 13 MG/DL — SIGNIFICANT CHANGE UP (ref 7–23)
C PEPTIDE SERPL-MCNC: 0.8 NG/ML — LOW (ref 1.1–4.4)
CALCIUM SERPL-MCNC: 7.6 MG/DL — LOW (ref 8.4–10.5)
CHLORIDE SERPL-SCNC: 100 MMOL/L — SIGNIFICANT CHANGE UP (ref 96–108)
CO2 SERPL-SCNC: 24 MMOL/L — SIGNIFICANT CHANGE UP (ref 22–31)
CREAT SERPL-MCNC: 0.84 MG/DL — SIGNIFICANT CHANGE UP (ref 0.5–1.3)
CULTURE RESULTS: ABNORMAL
EGFR: 101 ML/MIN/1.73M2 — SIGNIFICANT CHANGE UP
EGFR: 101 ML/MIN/1.73M2 — SIGNIFICANT CHANGE UP
GLUCOSE BLDC GLUCOMTR-MCNC: 170 MG/DL — HIGH (ref 70–99)
GLUCOSE BLDC GLUCOMTR-MCNC: 179 MG/DL — HIGH (ref 70–99)
GLUCOSE BLDC GLUCOMTR-MCNC: 193 MG/DL — HIGH (ref 70–99)
GLUCOSE BLDC GLUCOMTR-MCNC: 77 MG/DL — SIGNIFICANT CHANGE UP (ref 70–99)
GLUCOSE SERPL-MCNC: 216 MG/DL — HIGH (ref 70–99)
HCT VFR BLD CALC: 34.7 % — LOW (ref 39–50)
HGB BLD-MCNC: 11.3 G/DL — LOW (ref 13–17)
MAGNESIUM SERPL-MCNC: 2 MG/DL — SIGNIFICANT CHANGE UP (ref 1.6–2.6)
MCHC RBC-ENTMCNC: 26.4 PG — LOW (ref 27–34)
MCHC RBC-ENTMCNC: 32.6 G/DL — SIGNIFICANT CHANGE UP (ref 32–36)
MCV RBC AUTO: 81.1 FL — SIGNIFICANT CHANGE UP (ref 80–100)
NRBC BLD AUTO-RTO: 0 /100 WBCS — SIGNIFICANT CHANGE UP (ref 0–0)
ORGANISM # SPEC MICROSCOPIC CNT: ABNORMAL
PHOSPHATE SERPL-MCNC: 2.5 MG/DL — SIGNIFICANT CHANGE UP (ref 2.5–4.5)
PLATELET # BLD AUTO: 244 K/UL — SIGNIFICANT CHANGE UP (ref 150–400)
POTASSIUM SERPL-MCNC: 3.7 MMOL/L — SIGNIFICANT CHANGE UP (ref 3.5–5.3)
POTASSIUM SERPL-SCNC: 3.7 MMOL/L — SIGNIFICANT CHANGE UP (ref 3.5–5.3)
RBC # BLD: 4.28 M/UL — SIGNIFICANT CHANGE UP (ref 4.2–5.8)
RBC # FLD: 14.7 % — HIGH (ref 10.3–14.5)
SODIUM SERPL-SCNC: 136 MMOL/L — SIGNIFICANT CHANGE UP (ref 135–145)
SPECIMEN SOURCE: SIGNIFICANT CHANGE UP
WBC # BLD: 13.81 K/UL — HIGH (ref 3.8–10.5)
WBC # FLD AUTO: 13.81 K/UL — HIGH (ref 3.8–10.5)

## 2025-03-11 PROCEDURE — 99222 1ST HOSP IP/OBS MODERATE 55: CPT

## 2025-03-11 PROCEDURE — 99232 SBSQ HOSP IP/OBS MODERATE 35: CPT

## 2025-03-11 PROCEDURE — G0545: CPT

## 2025-03-11 RX ORDER — CEFTRIAXONE 500 MG/1
INJECTION, POWDER, FOR SOLUTION INTRAMUSCULAR; INTRAVENOUS
Refills: 0 | Status: DISCONTINUED | OUTPATIENT
Start: 2025-03-11 | End: 2025-03-11

## 2025-03-11 RX ORDER — METRONIDAZOLE 250 MG
500 TABLET ORAL EVERY 12 HOURS
Refills: 0 | Status: DISCONTINUED | OUTPATIENT
Start: 2025-03-11 | End: 2025-03-20

## 2025-03-11 RX ORDER — CEFTRIAXONE 500 MG/1
1000 INJECTION, POWDER, FOR SOLUTION INTRAMUSCULAR; INTRAVENOUS ONCE
Refills: 0 | Status: COMPLETED | OUTPATIENT
Start: 2025-03-11 | End: 2025-03-11

## 2025-03-11 RX ORDER — HYDROMORPHONE/SOD CHLOR,ISO/PF 2 MG/10 ML
0.2 SYRINGE (ML) INJECTION ONCE
Refills: 0 | Status: DISCONTINUED | OUTPATIENT
Start: 2025-03-11 | End: 2025-03-11

## 2025-03-11 RX ORDER — INSULIN LISPRO 100 U/ML
5 INJECTION, SOLUTION INTRAVENOUS; SUBCUTANEOUS
Refills: 0 | Status: DISCONTINUED | OUTPATIENT
Start: 2025-03-11 | End: 2025-03-11

## 2025-03-11 RX ORDER — CEFTRIAXONE 500 MG/1
1000 INJECTION, POWDER, FOR SOLUTION INTRAMUSCULAR; INTRAVENOUS EVERY 24 HOURS
Refills: 0 | Status: COMPLETED | OUTPATIENT
Start: 2025-03-12 | End: 2025-03-18

## 2025-03-11 RX ORDER — CEFTRIAXONE 500 MG/1
INJECTION, POWDER, FOR SOLUTION INTRAMUSCULAR; INTRAVENOUS
Refills: 0 | Status: COMPLETED | OUTPATIENT
Start: 2025-03-12 | End: 2025-03-19

## 2025-03-11 RX ORDER — SOD PHOS DI, MONO/K PHOS MONO 250 MG
1 TABLET ORAL ONCE
Refills: 0 | Status: COMPLETED | OUTPATIENT
Start: 2025-03-11 | End: 2025-03-11

## 2025-03-11 RX ORDER — INSULIN LISPRO 100 U/ML
5 INJECTION, SOLUTION INTRAVENOUS; SUBCUTANEOUS
Refills: 0 | Status: DISCONTINUED | OUTPATIENT
Start: 2025-03-11 | End: 2025-03-12

## 2025-03-11 RX ORDER — INSULIN LISPRO 100 U/ML
11 INJECTION, SOLUTION INTRAVENOUS; SUBCUTANEOUS
Refills: 0 | Status: DISCONTINUED | OUTPATIENT
Start: 2025-03-11 | End: 2025-03-11

## 2025-03-11 RX ADMIN — Medication 1 CAPSULE(S): at 09:18

## 2025-03-11 RX ADMIN — Medication 500 MILLIGRAM(S): at 18:10

## 2025-03-11 RX ADMIN — Medication 300 MILLIGRAM(S): at 05:05

## 2025-03-11 RX ADMIN — Medication 1 CAPSULE(S): at 18:11

## 2025-03-11 RX ADMIN — Medication 1 CAPSULE(S): at 13:49

## 2025-03-11 RX ADMIN — Medication 25 GRAM(S): at 00:22

## 2025-03-11 RX ADMIN — POLYETHYLENE GLYCOL 3350 17 GRAM(S): 17 POWDER, FOR SOLUTION ORAL at 11:11

## 2025-03-11 RX ADMIN — Medication 1 APPLICATION(S): at 05:12

## 2025-03-11 RX ADMIN — INSULIN LISPRO 1: 100 INJECTION, SOLUTION INTRAVENOUS; SUBCUTANEOUS at 18:12

## 2025-03-11 RX ADMIN — Medication 0.2 MILLIGRAM(S): at 22:08

## 2025-03-11 RX ADMIN — Medication 25 GRAM(S): at 09:16

## 2025-03-11 RX ADMIN — Medication 2 TABLET(S): at 21:45

## 2025-03-11 RX ADMIN — INSULIN LISPRO 1: 100 INJECTION, SOLUTION INTRAVENOUS; SUBCUTANEOUS at 09:17

## 2025-03-11 RX ADMIN — INSULIN LISPRO 8 UNIT(S): 100 INJECTION, SOLUTION INTRAVENOUS; SUBCUTANEOUS at 09:16

## 2025-03-11 RX ADMIN — INSULIN LISPRO 5 UNIT(S): 100 INJECTION, SOLUTION INTRAVENOUS; SUBCUTANEOUS at 18:12

## 2025-03-11 RX ADMIN — ENOXAPARIN SODIUM 40 MILLIGRAM(S): 100 INJECTION SUBCUTANEOUS at 13:50

## 2025-03-11 RX ADMIN — Medication 0.2 MILLIGRAM(S): at 22:38

## 2025-03-11 RX ADMIN — Medication 1 PACKET(S): at 10:19

## 2025-03-11 RX ADMIN — INSULIN GLARGINE-YFGN 26 UNIT(S): 100 INJECTION, SOLUTION SUBCUTANEOUS at 21:45

## 2025-03-11 RX ADMIN — CEFTRIAXONE 100 MILLIGRAM(S): 500 INJECTION, POWDER, FOR SOLUTION INTRAMUSCULAR; INTRAVENOUS at 11:10

## 2025-03-11 NOTE — PROGRESS NOTE ADULT - ASSESSMENT
59y Male with hx of T2DM, HTN, HLD, pancreatic adenocarcinoma s/p noéippbyron (2017), presents with scrotal and perineal swelling. 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 Endocrine was consulted for T2DM. Patient is feeling okay, eating mostly full meals and tolerating POs. Patient is s/p I&D and exploration done 10/9. Patient now started on IV abx -> noted change in IV abx by ID today to IV Ceftx in D5% solution. Messaged primary team to change to NS solution to prevent hyperglycemia FBG stable, no hypoglycemia. Given persistent postprandial hyperglycemia, will increase mealtime dose for tighter BG control. Endocrine will closely monitor BG and adjust insulin as needed for BG goal 100-180mg/dL inpatient.     # T2DM with hyperglycemia   - Most recent Hemoglobin A1C 9.3%  - At home, on lantus 26 units and lispro 10-12 units premeals  - Current FS ranges from low-mid 200 with improved appetite  C-Peptide, Serum: 2.2 ng/mL (11.16.18 @ 12:10)  Repeat c-peptide -> PENDING      59y Male with hx of T2DM, HTN, HLD, pancreatic adenocarcinoma s/p juan (2017), presents with scrotal and perineal swelling. 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 Endocrine was consulted for T2DM. Patient is feeling okay, eating mostly full meals and tolerating POs. Patient is s/p I&D and exploration done 10/9. Patient now started on IV abx -> noted change in IV abx by ID today to IV Ceftx in D5% solution. Messaged primary team to change to NS solution to prevent hyperglycemia FBG stable, no hypoglycemia. Given persistent postprandial hyperglycemia, will increase mealtime dose for tighter BG control. Endocrine will closely monitor BG and adjust insulin as needed for BG goal 100-180mg/dL inpatient.     Addendum 1447: Noted BG at 1200 low to 77mg/dL, no hypoglycemia. Patient did eat about 50% or less of breakfast. Mealtime insulin with lunch held. Will lower mealtime insulin to prevent hypoglycemia. Mealtime insulin should be held if patient eating <50% of his meal tray.     # T2DM with hyperglycemia   - Most recent Hemoglobin A1C 9.3%  - At home, on lantus 26 units and lispro 10-12 units premeals  - Current FS ranges from low-mid 200 with improved appetite  C-Peptide, Serum: 2.2 ng/mL (11.16.18 @ 12:10)  Repeat c-peptide -> PENDING

## 2025-03-11 NOTE — PHYSICAL THERAPY INITIAL EVALUATION ADULT - PLANNED THERAPY INTERVENTIONS, PT EVAL
GOAL: Pt will negotiate up/down 6 steps with handrail independently in 2 weeks./bed mobility training/gait training/transfer training

## 2025-03-11 NOTE — CONSULT NOTE ADULT - SUBJECTIVE AND OBJECTIVE BOX
Patient is a 59y old  Male who presents with a chief complaint of Fornier's gangrene (10 Mar 2025 14:46)    HPI:  HPI:  59M PMH DM, pancreatic cancer, s/p whipple in [facility], presents with perirectal pain. Patient states he had perirectal pain, and an abscess was drained at an outpt office and he was sent home on PO antibiotics. The patient noticed worsening scrotal swelling, pain so he presented to the ED. 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     PAST MEDICAL HISTORY:  Pancreatic cancer    DM (diabetes mellitus)        PAST SURGICAL HISTORY:  No significant past surgical history        ALLERGIES:  No Known Allergies      MEDICATIONS:  clindamycin IVPB 600 milliGRAM(s) IV Intermittent once      VITALS & I/Os:  Vital Signs Last 24 Hrs  T(C): 36.7 (07 Mar 2025 20:40), Max: 37 (07 Mar 2025 19:36)  T(F): 98.1 (07 Mar 2025 20:40), Max: 98.6 (07 Mar 2025 19:36)  HR: 90 (07 Mar 2025 21:10) (90 - 117)  BP: 104/59 (07 Mar 2025 21:10) (97/59 - 110/64)  BP(mean): 77 (07 Mar 2025 21:10) (77 - 84)  RR: 20 (07 Mar 2025 21:10) (20 - 20)  SpO2: 99% (07 Mar 2025 21:10) (98% - 99%)    Parameters below as of 07 Mar 2025 21:10  Patient On (Oxygen Delivery Method): room air        I&O's Summary         Respiratory: Nonlabored respirations   Cardiovascular: pulse present  Abdominal: Soft, nondistended, nontender  : I&D site c/d/i without active purulent drainage, R perineum and scrotum edematous, warm to touch, and tender  Extremities: Appears warm and well perfused see below    LABS:                        11.6   11.50 )-----------( 180      ( 07 Mar 2025 20:55 )             34.4     03-07    135  |  100  |  13  ----------------------------<  212[H]  3.5   |  23  |  0.98    Ca    8.4      07 Mar 2025 20:55  Phos  1.2     03-07  Mg     2.1     03-07    TPro  6.3  /  Alb  3.0[L]  /  TBili  0.8  /  DBili  x   /  AST  32  /  ALT  34  /  AlkPhos  124[H]  03-07    Lactate:    PT/INR - ( 07 Mar 2025 20:55 )   PT: 15.7 sec;   INR: 1.37 ratio         PTT - ( 07 Mar 2025 20:55 )  PTT:31.5 sec          Urinalysis Basic - ( 07 Mar 2025 20:55 )    Color: x / Appearance: x / SG: x / pH: x  Gluc: 212 mg/dL / Ketone: x  / Bili: x / Urobili: x   Blood: x / Protein: x / Nitrite: x   Leuk Esterase: x / RBC: x / WBC x   Sq Epi: x / Non Sq Epi: x / Bacteria: x        IMAGING:  < from: CT Abdomen and Pelvis w/ IV Cont (03.07.25 @ 21:14) >  PROCEDURE:  CT of the Abdomen and Pelvis was performed.  Sagittal and coronal reformats were performed.    FINDINGS:  LOWER CHEST: Minimal linear atelectasis. Borderline heart size. Calcified   atherosclerosis of the coronary arteries.    LIVER: Grossly stable indeterminate hyperenhancing lesion in the   posterior subcapsular margin of segment 7 measures up to 1.1 cm and may   be further assessed and characterized with outpatient contrast-enhanced   MRI liver mass protocol.  BILE DUCTS: Mild central pneumobilia, with a left predominance.  GALLBLADDER: Cholecystectomy.  SPLEEN: Within normal limits.  PANCREAS: Atrophic and infiltrated by fat. There are small gas   collections scattered throughout the pancreatic duct, which is not   significantly dilated.  ADRENALS: Within normal limits.  KIDNEYS/URETERS: Within normal limits.    BLADDER: Moderate diffuse wall thickening and mild mucosal   hyperenhancement are findings that need further assessment and   characterization with urinalysis and urine cultures, to exclude an   underlying cystitis.  REPRODUCTIVE ORGANS: Gas is tracing down the space between the prostate   gland and the medial surface of the left obturator internus. The prostate   gland is mildly enlarged. There is also subcutaneous gas along the right   side of the penile shaft, and extending to the adjacent perineal region,   perianal region, and scrotal sac. Complex fluid is seen surrounding both   testicles. Inflammatory changes are seen throughout all these described   areas in the pelvis.    BOWEL: No bowel obstruction. Appendix is not visualized. No evidence of   inflammation in the pericecal region. Partial bowel resection surgical   changes, without CT evident complications.  PERITONEUM/RETROPERITONEUM: Within normal limits.  VESSELS: Atherosclerotic changes.  LYMPH NODES: No lymphadenopathy.  ABDOMINAL WALL: Postsurgical changes.  BONES: Degenerative changes.    IMPRESSION:  Inflammation andsubcutaneous emphysema involving the scrotal sac,   perineal region, perianal region, right penile shaft, and space between   the prostate gland and the left obturator internus. Complex fluid is seen   within the scrotum as well. These findings are suspicious for an ongoing   gas producing infectious process, although no definite organized abscess   is visualized. Correlate with physical examination.    < end of copied text >   (08 Mar 2025 01:54)      PAST MEDICAL & SURGICAL HISTORY:  Pancreatic cancer      DM (diabetes mellitus)      No significant past surgical history          Social history:    FAMILY HISTORY:    REVIEW OF SYSTEMS  General:	Denies any malaise fatigue or chills. Fevers absent    Skin:No rash  	  Ophthalmologic:Denies any visual complaints,discharge redness or photophobia  	  ENMT:No nasal discharge,headache,sinus congestion or throat pain.No dental complaints    Respiratory and Thorax:No cough,sputum or chest pain.Denies shortness of breath  	  Cardiovascular:	No chest pain,palpitaions or dizziness    Gastrointestinal:	NO nausea,abdominal pain or diarrhea.    Genitourinary:	No dysuria,frequency. No flank pain    Musculoskeletal:	No joint swelling or pain.No weakness    Neurological:No confusion,diziness.No extremity weakness.No bladder or bowel incontinence	    Psychiatric:No delusions or hallucinations	    Hematology/Lymphatics:	No LN swelling.No gum bleeding     Endocrine:	No recent weight gain or loss.No abnormal heat/cold intolerance    Allergic/Immunologic:	No hives or rash   Allergies    No Known Allergies    Intolerances        Antimicrobials:    piperacillin/tazobactam IVPB.. 3.375 Gram(s) IV Intermittent every 8 hours  vancomycin  IVPB 1500 milliGRAM(s) IV Intermittent every 12 hours  vancomycin  IVPB            Vital Signs Last 24 Hrs  T(C): 37.3 (11 Mar 2025 09:30), Max: 37.6 (11 Mar 2025 00:20)  T(F): 99.1 (11 Mar 2025 09:30), Max: 99.6 (11 Mar 2025 00:20)  HR: 54 (11 Mar 2025 09:30) (54 - 69)  BP: 101/56 (11 Mar 2025 09:30) (101/56 - 144/79)  BP(mean): --  RR: 18 (11 Mar 2025 09:30) (18 - 18)  SpO2: 96% (11 Mar 2025 09:30) (96% - 98%)    Parameters below as of 11 Mar 2025 09:30  Patient On (Oxygen Delivery Method): room air        PHYSICAL EXAM:Pleasant patient in no acute distress.      Constitutional:Comfortable.Awake and alert  No cachexia     Eyes:PERRL EOMI.NO discharge or conjunctival injection    ENMT:No sinus tenderness.No thrush.No pharyngeal exudate or erythema.Fair dental hygiene    Neck:Supple,No LN,no JVD      Respiratory:Good air entry bilaterally,CTA    Cardiovascular:S1 S2 wnl, No murmurs,rub or gallops    Gastrointestinal:Soft BS(+) no tenderness no masses ,No rebound or guarding    Genitourinary:No CVA tendereness     Rectal:    Extremities:No cyanosis,clubbing or edema.    Vascular:peripheral pulses felt    Neurological:AAO X 3,No grossly focal deficits    Skin:No rash     Lymph Nodes:No palpable LNs    Musculoskeletal:No joint swelling or LOM    Psychiatric:Affect normal.                                11.3   13.81 )-----------( 244      ( 11 Mar 2025 09:02 )             34.7         03-11    136  |  100  |  13  ----------------------------<  216[H]  3.7   |  24  |  0.84    Ca    7.6[L]      11 Mar 2025 09:02  Phos  2.5     03-11  Mg     2.0     03-11        RECENT CULTURES:  03-08 @ 06:05  Swab Perineal Wound Culture (Swab)  --  --  --    Culture yields growth of greater than 3 colony types of  bacteria,  which may indicate contamination and normal deangelo  Call client services within 7 days if further workup is clinically  indicated.  --  03-08 @ 05:59  Tissue Perineal Wound Culture (Tissue)  Escherichia coli  Escherichia coli  Edwardsiella tarda  Escherichia coli  DONIS    Moderate Escherichia coli Multiple Morphological Strains  Moderate Edwardsiella tarda  Moderate Streptococcus anginosus "Susceptibilities not performed"  Few Bacteroides ovatus group "Susceptibilities not performed"  Moderate Prevotella baroniae "Susceptibilities not performed"  --  03-07 @ 23:48  Clean Catch Clean Catch (Midstream)  --  --  --    No growth  --      MICROBIOLOGY:  Culture Results:   Culture yields growth of greater than 3 colony types of  bacteria,  which may indicate contamination and normal deangelo  Call client services within 7 days if further workup is clinically  indicated. (03-08 @ 06:05)  Culture Results:   Moderate Escherichia coli Multiple Morphological Strains  Moderate Edwardsiella tarda  Moderate Streptococcus anginosus "Susceptibilities not performed"  Few Bacteroides ovatus group "Susceptibilities not performed"  Moderate Prevotella baroniae "Susceptibilities not performed" (03-08 @ 05:59)  Culture Results:   No growth (03-07 @ 23:48)          Radiology:      Assessment:        Recommendations and Plan:    Pager [phone]  After 5 pm/weekends or if no response :[phone]

## 2025-03-11 NOTE — PROGRESS NOTE ADULT - ASSESSMENT
59M PMH DM, pancreatic cancer, s/p whipple in [facility] presents with scrotal and perineal swelling c/f Fornier's gangrene. The patient is now s/p I&D of Perineum and Groin, and Scrotal Exploration with ACS/Trauma and Urology now s/p Irrigation and debridement of perineum and groin.     Recommendations  - Wet to dry dressing changes  - Espinoza out. Passed TOV   -ID consulted and recommend Flagyl and IV rocephin   -Endo following, appreciate recs   - DVT ppx: Lvx  - Diet: CC  - pain control PRN       ACS/Trauma Surgery  [id number]

## 2025-03-11 NOTE — CONSULT NOTE ADULT - ASSESSMENT
66 year old s/p Whipple's for pancreatic cancer .    Recent treated  for perirectal abscess admitted with selling, gasand pain in scrotum. Underwent debridement several items   cultures \  polymicrobial with E-COLI Prevotella Edwardsiella   current wd pack and doing well  non toxic    Resoling necrotizing scrotal infection Unclear if related to pancreatic cancer   will need several weeks of ab  we can change  to ceftriaxone and metronidazole for easy of administration

## 2025-03-11 NOTE — PROGRESS NOTE ADULT - SUBJECTIVE AND OBJECTIVE BOX
Patient seen today for follow up inpatient Diabetes Mellitus management.    Chief Complaint: Type 2 Diabetes Mellitus     INTERVAL HX:  Patient seen in [facility] 2MON 215 D1. Patient is alert and oriented, resting in bed. Patient reports feeling okay, is eating mostly full meals (>50% of meal tray) and tolerating POs. FBG stable this am to 170mg/dL. NO hypoglycemia. Noted persistent postprandial hyperglycemia to high 200s yesterday and last evening. Noted patient IV abx, Vanco and Zoysn, Zoysn in D5%. Now started IV Ceftx in D5%. Blood glucose levels in the last 24hrs have been 170-273mg/dL.     Review of Systems:  General: As above.  Respiratory: Denies any SOB, MAKI, or cough.  Gastrointestinal: Denies any n/v/d or abdominal pain.   Endocrine: Denies any polyuria, polydipsia, polyphagia, visual changes, or numbness in feet.     Allergies  No Known Allergies      Intolerances  None.       MEDICATIONS  (STANDING):  acetaminophen   IVPB .. 1000 milliGRAM(s) IV Intermittent every 6 hours PRN  cefTRIAXone   IVPB      cefTRIAXone   IVPB 1000 milliGRAM(s) IV Intermittent once  chlorhexidine 2% Cloths 1 Application(s) Topical <User Schedule>  dextrose Oral Gel 15 Gram(s) Oral once PRN  enoxaparin Injectable 40 milliGRAM(s) SubCutaneous every 24 hours  insulin glargine Injectable (LANTUS) 26 Unit(s) SubCutaneous at bedtime  insulin lispro (ADMELOG) corrective regimen sliding scale   SubCutaneous at bedtime  insulin lispro (ADMELOG) corrective regimen sliding scale   SubCutaneous three times a day before meals  insulin lispro Injectable (ADMELOG) 11 Unit(s) SubCutaneous three times a day before meals  metroNIDAZOLE    Tablet 500 milliGRAM(s) Oral every 12 hours  oxyCODONE    IR 2.5 milliGRAM(s) Oral every 4 hours PRN  pancrelipase  (CREON 12,000 Lipase Units) 1 Capsule(s) Oral three times a day with meals  polyethylene glycol 3350 17 Gram(s) Oral daily  senna 2 Tablet(s) Oral at bedtime      dextrose Oral Gel 15 Gram(s) Oral once PRN  insulin glargine Injectable (LANTUS) 26 Unit(s) SubCutaneous at bedtime  insulin lispro (ADMELOG) corrective regimen sliding scale   SubCutaneous at bedtime  insulin lispro (ADMELOG) corrective regimen sliding scale   SubCutaneous three times a day before meals  insulin lispro Injectable (ADMELOG) 11 Unit(s) SubCutaneous three times a day before meals      insulin lispro (ADMELOG) corrective regimen sliding scale   SubCutaneous at bedtime  insulin lispro (ADMELOG) corrective regimen sliding scale   SubCutaneous three times a day before meals  insulin lispro Injectable (ADMELOG) 11 Unit(s) SubCutaneous three times a day before meals      PHYSICAL EXAM:  VITALS:   T(C): 37.3 (03-11-25 @ 09:30), Max: 37.6 (03-11-25 @ 00:20)  HR: 54 (03-11-25 @ 09:30) (54 - 69)  BP: 101/56 (03-11-25 @ 09:30) (101/56 - 144/79)  RR: 18 (03-11-25 @ 09:30) (18 - 18)  SpO2: 96% (03-11-25 @ 09:30) (96% - 98%)    GENERAL: In no acute distress  Respiratory: Respirations unlabored  Extremities: Warm and dry, no edema  NEURO: Alert and oriented, appropriate     LABS:  POCT Blood Glucose.: 170 mg/dL (03-11-25 @ 09:04)  POCT Blood Glucose.: 273 mg/dL (03-10-25 @ 21:56)  POCT Blood Glucose.: 246 mg/dL (03-10-25 @ 19:41)  POCT Blood Glucose.: 251 mg/dL (03-10-25 @ 18:08)  POCT Blood Glucose.: 284 mg/dL (03-10-25 @ 13:52)  POCT Blood Glucose.: 210 mg/dL (03-10-25 @ 09:58)  POCT Blood Glucose.: 219 mg/dL (03-10-25 @ 02:15)  POCT Blood Glucose.: 275 mg/dL (03-09-25 @ 22:22)  POCT Blood Glucose.: 122 mg/dL (03-09-25 @ 16:38)  POCT Blood Glucose.: 155 mg/dL (03-09-25 @ 11:40)  POCT Blood Glucose.: 161 mg/dL (03-09-25 @ 07:38)  POCT Blood Glucose.: 246 mg/dL (03-08-25 @ 22:23)  POCT Blood Glucose.: 203 mg/dL (03-08-25 @ 16:59)  POCT Blood Glucose.: 222 mg/dL (03-08-25 @ 11:12)                          11.3   13.81 )-----------( 244      ( 11 Mar 2025 09:02 )             34.7     03-11    136  |  100  |  13  ----------------------------<  216[H]  3.7   |  24  |  0.84    Ca    7.6[L]      11 Mar 2025 09:02  Phos  2.5     03-11  Mg     2.0     03-11        PT/INR - ( 09 Mar 2025 14:19 )   PT: 16.7 sec;   INR: 1.46 ratio         PTT - ( 09 Mar 2025 14:19 )  PTT:32.0 sec      Urinalysis Basic - ( 11 Mar 2025 09:02 )    Color: x / Appearance: x / SG: x / pH: x  Gluc: 216 mg/dL / Ketone: x  / Bili: x / Urobili: x   Blood: x / Protein: x / Nitrite: x   Leuk Esterase: x / RBC: x / WBC x   Sq Epi: x / Non Sq Epi: x / Bacteria: x        A1C with Estimated Average Glucose Result: A1C with Estimated Average Glucose Result: 9.3 % (03-08-25 @ 05:35)

## 2025-03-11 NOTE — PHYSICAL THERAPY INITIAL EVALUATION ADULT - LEVEL OF INDEPENDENCE, REHAB EVAL
Called and left a phone message.  Explained that Dr. Graham has reviewed his Abdominal U/S.  Dr. Graham wanted us to let him know that his U/S did not reveal any new problems.  Explained that I sent a message to his PCP with his Clinic blood pressure readings.  Left the Office phone number for any questions.  
independent

## 2025-03-11 NOTE — PROGRESS NOTE ADULT - SUBJECTIVE AND OBJECTIVE BOX
SURGERY DAILY PROGRESS NOTE    STATUS POST:  Irrigation and debridement of perineum and groin    Scrotal exploration        SUBJECTIVE: Pt seen and examined at bedside. No complaints. Pt having frequent stooling that is leaking onto post-surgical site.       OBJECTIVE:  Vital Signs Last 24 Hrs  T(C): 37.3 (11 Mar 2025 09:30), Max: 37.6 (11 Mar 2025 00:20)  T(F): 99.1 (11 Mar 2025 09:30), Max: 99.6 (11 Mar 2025 00:20)  HR: 54 (11 Mar 2025 09:30) (54 - 69)  BP: 101/56 (11 Mar 2025 09:30) (101/56 - 144/79)  BP(mean): --  RR: 18 (11 Mar 2025 09:30) (18 - 18)  SpO2: 96% (11 Mar 2025 09:30) (96% - 98%)    Parameters below as of 11 Mar 2025 09:30  Patient On (Oxygen Delivery Method): room air        I&O's Summary    10 Mar 2025 07:01  -  11 Mar 2025 07:00  --------------------------------------------------------  IN: 560 mL / OUT: 2850 mL / NET: -2290 mL    11 Mar 2025 07:01  -  11 Mar 2025 12:37  --------------------------------------------------------  IN: 270 mL / OUT: 800 mL / NET: -530 mL        Physical Exam:  General: NAD, resting in bed comfortably.  Cardiac: regular rate, warm and well perfused  Respiratory: Nonlabored respirations, normal cw expansion.  Abdomen: soft, nontender, nondistended  : Wound Vac,   Extremities: normal strength, FROM, no deformities    LABS:                        11.3   13.81 )-----------( 244      ( 11 Mar 2025 09:02 )             34.7     03-11    136  |  100  |  13  ----------------------------<  216[H]  3.7   |  24  |  0.84    Ca    7.6[L]      11 Mar 2025 09:02  Phos  2.5     03-11  Mg     2.0     03-11      PT/INR - ( 09 Mar 2025 14:19 )   PT: 16.7 sec;   INR: 1.46 ratio         PTT - ( 09 Mar 2025 14:19 )  PTT:32.0 sec  Urinalysis Basic - ( 11 Mar 2025 09:02 )    Color: x / Appearance: x / SG: x / pH: x  Gluc: 216 mg/dL / Ketone: x  / Bili: x / Urobili: x   Blood: x / Protein: x / Nitrite: x   Leuk Esterase: x / RBC: x / WBC x   Sq Epi: x / Non Sq Epi: x / Bacteria: x        RADIOLOGY & ADDITIONAL STUDIES:

## 2025-03-11 NOTE — PHYSICAL THERAPY INITIAL EVALUATION ADULT - NSPTDMEREC_GEN_A_CORE
Patient will require a rolling walker at home due to their Dx of I&D of perineum and groin, now with pain to help complete MRADL's. (Mobility related aides for daily living)/rolling walker

## 2025-03-11 NOTE — PROGRESS NOTE ADULT - PROBLEM SELECTOR PLAN 1
Inpatient Plan:  - Check BG TID AC and HS while on PO diet   - C/w Lantus 26u QHS  - Adjust Admelog to 11u TID AC (HOLD if NPO)  - C/w low dose Admelog correctional scales TID AC and HS  - Please keep all IV abx in NS solution if possible     Discharge Planning:   - Patient to continue with basal/bolus insulin regimen. Dosage TBD closer to d/c based on inpatient glycemic requirement.   - If c-peptide elevated, may incorporate non-insulin agent in future. However, given poor baseline glycemic control and with fornier's gangrene, would require insulin regimen for now.  - Patient would benefit from CGM- please place order for CGM (Dexcom G7 or Freestyle Marlee 3 whichever is covered by patient insurance). If patient cannot get krys on phone, please send for rx [place] x1 and Sensor x3.   - Patient should check BG TID AC and HS at home. Can use CGM to monitor BGs but if BG <100mg/dL or >250mg/dL, patient should confirm with fingerstick BG. Please tell patient to contact Endocrinologist if BG <70mg/dL x1 or >200mg/dL consistently or >400mg/dL x1.  - Patient to follow up with endocrinologist Dr. Panda at [place]. Inpatient Plan:  - Check BG TID AC and HS while on PO diet   - C/w Lantus 26u QHS  - Adjust Admelog to 5u TID AC (HOLD if NPO)  - C/w low dose Admelog correctional scales TID AC and HS  - Please keep all IV abx in NS solution if possible     Discharge Planning:   - Patient to continue with basal/bolus insulin regimen. Dosage TBD closer to d/c based on inpatient glycemic requirement.   - If c-peptide elevated, may incorporate non-insulin agent in future. However, given poor baseline glycemic control and with fornier's gangrene, would require insulin regimen for now.  - Patient would benefit from CGM- please place order for CGM (Dexcom G7 or Freestyle Marlee 3 whichever is covered by patient insurance). If patient cannot get krys on phone, please send for rx [place] x1 and Sensor x3.   - Patient should check BG TID AC and HS at home. Can use CGM to monitor BGs but if BG <100mg/dL or >250mg/dL, patient should confirm with fingerstick BG. Please tell patient to contact Endocrinologist if BG <70mg/dL x1 or >200mg/dL consistently or >400mg/dL x1.  - Patient to follow up with endocrinologist Dr. Panda at [place]. Inpatient Plan:  - Check BG TID AC and HS while on PO diet   - C/w Lantus 26u QHS  - Adjust Admelog to 5u TID AC (HOLD if NPO or <50% of meal)  - C/w low dose Admelog correctional scales TID AC and HS  - Please keep all IV abx in NS solution if possible     Discharge Planning:   - Patient to continue with basal/bolus insulin regimen. Dosage TBD closer to d/c based on inpatient glycemic requirement.   - If c-peptide elevated, may incorporate non-insulin agent in future. However, given poor baseline glycemic control and with fornier's gangrene, would require insulin regimen for now.  - Patient would benefit from CGM- please place order for CGM (Dexcom G7 or Freestyle Marlee 3 whichever is covered by patient insurance). If patient cannot get krys on phone, please send for rx [place] x1 and Sensor x3.   - Patient should check BG TID AC and HS at home. Can use CGM to monitor BGs but if BG <100mg/dL or >250mg/dL, patient should confirm with fingerstick BG. Please tell patient to contact Endocrinologist if BG <70mg/dL x1 or >200mg/dL consistently or >400mg/dL x1.  - Patient to follow up with endocrinologist Dr. Panda at [place].

## 2025-03-11 NOTE — PHYSICAL THERAPY INITIAL EVALUATION ADULT - PERTINENT HX OF CURRENT PROBLEM, REHAB EVAL
59M PMH DM, pancreatic cancer, s/p whipple in [facility] presents with scrotal and perineal swelling c/f Fornier's gangrene. The patient is now s/p I&D of Perineum and Groin, and Scrotal Exploration with ACS/Trauma and Urology now s/p Irrigation and debridement of perineum and groin

## 2025-03-11 NOTE — PHYSICAL THERAPY INITIAL EVALUATION ADULT - ADDITIONAL COMMENTS
Per pt, lives with spouse in a condor apartment with 6 steps to enter via front door or ramp access.  1st floor apartment.  Reports independence with functional mobility and ADLs prior to admission. Does not own DME.

## 2025-03-11 NOTE — PHYSICAL THERAPY INITIAL EVALUATION ADULT - GAIT DISTANCE, PT EVAL
----- Message from Raynell Schaumann, LPN sent at 7/41/8918  8:36 AM EDT -----  Subject: Message to Provider    QUESTIONS  Information for Provider? Patient calling to see if Dr. Umberto Lerner could give a   few days off work due to having cyst/mass removed, he states that its   pretty painful and hard to sit and try and drive. also hurts when clothes   rub it please advise.  ---------------------------------------------------------------------------  --------------  Winifred Ba INFO  [phone]; Do not leave any message, patient will call back for answer  ---------------------------------------------------------------------------  --------------  SCRIPT ANSWERS  Relationship to Patient?  Self 25 feet

## 2025-03-12 ENCOUNTER — TRANSCRIPTION ENCOUNTER (OUTPATIENT)
Age: 60
End: 2025-03-12

## 2025-03-12 LAB
ANION GAP SERPL CALC-SCNC: 11 MMOL/L — SIGNIFICANT CHANGE UP (ref 5–17)
BUN SERPL-MCNC: 12 MG/DL — SIGNIFICANT CHANGE UP (ref 7–23)
C PEPTIDE SERPL-MCNC: 0.2 NG/ML — LOW (ref 1.1–4.4)
CALCIUM SERPL-MCNC: 8 MG/DL — LOW (ref 8.4–10.5)
CHLORIDE SERPL-SCNC: 106 MMOL/L — SIGNIFICANT CHANGE UP (ref 96–108)
CO2 SERPL-SCNC: 23 MMOL/L — SIGNIFICANT CHANGE UP (ref 22–31)
CREAT SERPL-MCNC: 0.83 MG/DL — SIGNIFICANT CHANGE UP (ref 0.5–1.3)
EGFR: 101 ML/MIN/1.73M2 — SIGNIFICANT CHANGE UP
EGFR: 101 ML/MIN/1.73M2 — SIGNIFICANT CHANGE UP
GLUCOSE BLDC GLUCOMTR-MCNC: 125 MG/DL — HIGH (ref 70–99)
GLUCOSE BLDC GLUCOMTR-MCNC: 234 MG/DL — HIGH (ref 70–99)
GLUCOSE BLDC GLUCOMTR-MCNC: 75 MG/DL — SIGNIFICANT CHANGE UP (ref 70–99)
GLUCOSE BLDC GLUCOMTR-MCNC: 94 MG/DL — SIGNIFICANT CHANGE UP (ref 70–99)
GLUCOSE SERPL-MCNC: 90 MG/DL — SIGNIFICANT CHANGE UP (ref 70–99)
HCT VFR BLD CALC: 32.9 % — LOW (ref 39–50)
HGB BLD-MCNC: 10.8 G/DL — LOW (ref 13–17)
MAGNESIUM SERPL-MCNC: 2.3 MG/DL — SIGNIFICANT CHANGE UP (ref 1.6–2.6)
MCHC RBC-ENTMCNC: 32.8 G/DL — SIGNIFICANT CHANGE UP (ref 32–36)
MCV RBC AUTO: 81 FL — SIGNIFICANT CHANGE UP (ref 80–100)
NRBC BLD AUTO-RTO: 0 /100 WBCS — SIGNIFICANT CHANGE UP (ref 0–0)
PHOSPHATE SERPL-MCNC: 2.9 MG/DL — SIGNIFICANT CHANGE UP (ref 2.5–4.5)
PLATELET # BLD AUTO: 271 K/UL — SIGNIFICANT CHANGE UP (ref 150–400)
POTASSIUM SERPL-MCNC: 3.4 MMOL/L — LOW (ref 3.5–5.3)
POTASSIUM SERPL-SCNC: 3.4 MMOL/L — LOW (ref 3.5–5.3)
RBC # BLD: 4.06 M/UL — LOW (ref 4.2–5.8)
RBC # FLD: 14.6 % — HIGH (ref 10.3–14.5)
SODIUM SERPL-SCNC: 140 MMOL/L — SIGNIFICANT CHANGE UP (ref 135–145)
WBC # BLD: 12.73 K/UL — HIGH (ref 3.8–10.5)
WBC # FLD AUTO: 12.73 K/UL — HIGH (ref 3.8–10.5)

## 2025-03-12 PROCEDURE — G0545: CPT

## 2025-03-12 PROCEDURE — 99232 SBSQ HOSP IP/OBS MODERATE 35: CPT

## 2025-03-12 RX ORDER — INSULIN LISPRO 100 U/ML
2 INJECTION, SOLUTION INTRAVENOUS; SUBCUTANEOUS
Refills: 0 | Status: DISCONTINUED | OUTPATIENT
Start: 2025-03-12 | End: 2025-03-13

## 2025-03-12 RX ORDER — INSULIN GLARGINE-YFGN 100 [IU]/ML
23 INJECTION, SOLUTION SUBCUTANEOUS AT BEDTIME
Refills: 0 | Status: DISCONTINUED | OUTPATIENT
Start: 2025-03-12 | End: 2025-03-12

## 2025-03-12 RX ORDER — INSULIN GLARGINE-YFGN 100 [IU]/ML
22 INJECTION, SOLUTION SUBCUTANEOUS AT BEDTIME
Refills: 0 | Status: DISCONTINUED | OUTPATIENT
Start: 2025-03-12 | End: 2025-03-15

## 2025-03-12 RX ADMIN — Medication 1 CAPSULE(S): at 12:57

## 2025-03-12 RX ADMIN — Medication 40 MILLIEQUIVALENT(S): at 10:01

## 2025-03-12 RX ADMIN — Medication 1 CAPSULE(S): at 09:50

## 2025-03-12 RX ADMIN — INSULIN LISPRO 5 UNIT(S): 100 INJECTION, SOLUTION INTRAVENOUS; SUBCUTANEOUS at 12:20

## 2025-03-12 RX ADMIN — INSULIN LISPRO 5 UNIT(S): 100 INJECTION, SOLUTION INTRAVENOUS; SUBCUTANEOUS at 09:50

## 2025-03-12 RX ADMIN — CEFTRIAXONE 100 MILLIGRAM(S): 500 INJECTION, POWDER, FOR SOLUTION INTRAMUSCULAR; INTRAVENOUS at 12:59

## 2025-03-12 RX ADMIN — Medication 1 CAPSULE(S): at 17:46

## 2025-03-12 RX ADMIN — POLYETHYLENE GLYCOL 3350 17 GRAM(S): 17 POWDER, FOR SOLUTION ORAL at 12:57

## 2025-03-12 RX ADMIN — ENOXAPARIN SODIUM 40 MILLIGRAM(S): 100 INJECTION SUBCUTANEOUS at 12:57

## 2025-03-12 RX ADMIN — Medication 500 MILLIGRAM(S): at 05:54

## 2025-03-12 RX ADMIN — INSULIN GLARGINE-YFGN 22 UNIT(S): 100 INJECTION, SOLUTION SUBCUTANEOUS at 22:22

## 2025-03-12 RX ADMIN — INSULIN LISPRO 2 UNIT(S): 100 INJECTION, SOLUTION INTRAVENOUS; SUBCUTANEOUS at 17:46

## 2025-03-12 RX ADMIN — Medication 500 MILLIGRAM(S): at 17:46

## 2025-03-12 RX ADMIN — Medication 2 TABLET(S): at 22:22

## 2025-03-12 NOTE — DISCHARGE NOTE PROVIDER - CARE PROVIDERS DIRECT ADDRESSES
,[email or web address]

## 2025-03-12 NOTE — DISCHARGE NOTE PROVIDER - NSDCFUADDAPPT_GEN_ALL_CORE_FT
Please call [phone] to schedule an appointment for follow up with Dr. Wallis or any of her associates within 1-2 weeks after discharge  Please call [phone] to schedule an appointment for follow up with Dr. Wallis or any of her associates within 1-2 weeks after discharge     Please call [phone] to schedule a follow up appointment with the [facility] at [address] [place] [address] within 1-2 weeks following discharge

## 2025-03-12 NOTE — DISCHARGE NOTE PROVIDER - NSDCCPCAREPLAN_GEN_ALL_CORE_FT
PRINCIPAL DISCHARGE DIAGNOSIS  Diagnosis: Pema gangrene  Assessment and Plan of Treatment:      PRINCIPAL DISCHARGE DIAGNOSIS  Diagnosis: Pema gangrene  Assessment and Plan of Treatment: You underwent incision and drainage on 3/8 and 3/9. You should continue your antibiotics as prescribed until 3/28 which include Ceftriaxone 1g IV daily and Metronidazole 500mg twice a day by mouth. For wound care, apply [place] way[place] with wet to dry three times a day     PRINCIPAL DISCHARGE DIAGNOSIS  Diagnosis: Pema gangrene  Assessment and Plan of Treatment: You may continue your normal daily activities as tolerated.  You may shower and/or sponge bathe. Please replace dressing if saturated/wet.   You underwent incision and drainage on 3/8 and 3/9.   You should continue your antibiotics as prescribed until 3/28 which include Ceftriaxone 1g IV daily and Metronidazole 500mg twice a day by mouth.   For wound care, apply wet to dry wayne three times a day

## 2025-03-12 NOTE — PROGRESS NOTE ADULT - SUBJECTIVE AND OBJECTIVE BOX
infectious diseases progress note:    Patient is a 59y old  Male who presents with a chief complaint of Scrotal/perineal swelling/infection (11 Mar 2025 11:02)        Pema's gangrene          ROS:  CONSTITUTIONAL:  Negative fever or chills, feels well, good appetite  EYES:  Negative  blurry vision or double vision  CARDIOVASCULAR:  Negative for chest pain or palpitations  RESPIRATORY:  Negative for cough, wheezing, or SOB   GASTROINTESTINAL:  Negative for nausea, vomiting, diarrhea, constipation, or abdominal pain  GENITOURINARY:  Negative frequency, urgency or dysuria  NEUROLOGIC:  No headache, confusion, dizziness, lightheadedness    Allergies    No Known Allergies    Intolerances        ANTIBIOTICS/RELEVANT:  antimicrobials  cefTRIAXone   IVPB      cefTRIAXone   IVPB 1000 milliGRAM(s) IV Intermittent once  cefTRIAXone   IVPB 1000 milliGRAM(s) IV Intermittent every 24 hours  metroNIDAZOLE    Tablet 500 milliGRAM(s) Oral every 12 hours    immunologic:    OTHER:  acetaminophen   IVPB .. 1000 milliGRAM(s) IV Intermittent every 6 hours PRN  chlorhexidine 2% Cloths 1 Application(s) Topical <User Schedule>  dextrose Oral Gel 15 Gram(s) Oral once PRN  enoxaparin Injectable 40 milliGRAM(s) SubCutaneous every 24 hours  insulin glargine Injectable (LANTUS) 26 Unit(s) SubCutaneous at bedtime  insulin lispro (ADMELOG) corrective regimen sliding scale   SubCutaneous at bedtime  insulin lispro (ADMELOG) corrective regimen sliding scale   SubCutaneous three times a day before meals  insulin lispro Injectable (ADMELOG) 5 Unit(s) SubCutaneous three times a day with meals  oxyCODONE    IR 2.5 milliGRAM(s) Oral every 4 hours PRN  pancrelipase  (CREON 12,000 Lipase Units) 1 Capsule(s) Oral three times a day with meals  polyethylene glycol 3350 17 Gram(s) Oral daily  senna 2 Tablet(s) Oral at bedtime      Objective:  Vital Signs Last 24 Hrs  T(C): 37.1 (12 Mar 2025 04:51), Max: 37.6 (11 Mar 2025 20:52)  T(F): 98.7 (12 Mar 2025 04:51), Max: 99.6 (11 Mar 2025 20:52)  HR: 52 (12 Mar 2025 04:51) (52 - 57)  BP: 144/69 (12 Mar 2025 04:51) (99/60 - 144/69)  BP(mean): --  RR: 18 (12 Mar 2025 04:51) (18 - 18)  SpO2: 98% (12 Mar 2025 04:51) (94% - 98%)    Parameters below as of 12 Mar 2025 04:51  Patient On (Oxygen Delivery Method): room air        PHYSICAL EXAM:  Constitutional:Well-developed, well nourished--no acute distress  Eyes:DOE, EOMI  Ear/Nose/Throat: no oral lesion, no sinus tenderness on percussion	  Neck:no JVD, no lymphadenopathy, supple  Respiratory: CTA elena  Cardiovascular: S1S2 RRR, no murmurs  Gastrointestinal:soft, (+) BS, no HSM  Extremities:no e/e/c        LABS:                        10.8   12.73 )-----------( 271      ( 12 Mar 2025 08:11 )             32.9     03-11    136  |  100  |  13  ----------------------------<  216[H]  3.7   |  24  |  0.84    Ca    7.6[L]      11 Mar 2025 09:02  Phos  2.5     03-11  Mg     2.0     03-11        Urinalysis Basic - ( 11 Mar 2025 09:02 )    Color: x / Appearance: x / SG: x / pH: x  Gluc: 216 mg/dL / Ketone: x  / Bili: x / Urobili: x   Blood: x / Protein: x / Nitrite: x   Leuk Esterase: x / RBC: x / WBC x   Sq Epi: x / Non Sq Epi: x / Bacteria: x          MICROBIOLOGY:    RECENT CULTURES:  03-08 @ 06:05 Swab Perineal Wound Culture (Swab)                Culture yields growth of greater than 3 colony types of  bacteria,  which may indicate contamination and normal deangelo  Call client services within 7 days if further workup is clinically  indicated.    03-08 @ 05:59 Tissue Perineal Wound Culture (Tissue)   DONIS    Few polymorphonuclear leukocytes per low power field  Moderate Gram Negative Rods per oil power field  Moderate Gram Positive Rods per oil power field  Moderate Gram positive cocci in pairs per oil power field    Escherichia coli  Escherichia coli  Edwardsiella tarda  Escherichia coli     Moderate Escherichia coli Multiple Morphological Strains  Moderate Edwardsiella tarda  Moderate Streptococcus anginosus "Susceptibilities not performed"  Few Bacteroides ovatus group "Susceptibilities not performed"  Moderate Prevotella baroniae "Susceptibilities not performed"    03-07 @ 23:48 Clean Catch Clean Catch (Midstream)                No growth          RESPIRATORY CULTURES:              RADIOLOGY & ADDITIONAL STUDIES:        Pager [phone]  After 5 pm/weekends or if no response :[phone]

## 2025-03-12 NOTE — CHART NOTE - NSCHARTNOTEFT_GEN_A_CORE
AFRICA SOLIZ  Gender: Male  59y  [facility] 2MON 215 D1    Patient not seen today, chart reviewed.     POCT Blood Glucose:  94 mg/dL (03-12-25 @ 12:18)  75 mg/dL (03-12-25 @ 09:33)  179 mg/dL (03-11-25 @ 21:01)  193 mg/dL (03-11-25 @ 17:29)      eMAR:  insulin glargine Injectable (LANTUS)   26 Unit(s) SubCutaneous (03-11-25 @ 21:45)    insulin lispro (ADMELOG) corrective regimen sliding scale   1 Unit(s) SubCutaneous (03-11-25 @ 18:12)    insulin lispro Injectable (ADMELOG)   5 Unit(s) SubCutaneous (03-12-25 @ 12:20)   5 Unit(s) SubCutaneous (03-12-25 @ 09:50)   5 Unit(s) SubCutaneous (03-11-25 @ 18:12)    Endocrinology following patient for T2DM management. In the last 24hrs BG levels have been 75-193mg/dL. FBG stable but on lower side this am to 75mg/dL, 90mg/dL on blood serum. No hypoglycemia. Will lower Lantus to 22u QHS to prevent hypoglycemia. Noted low postprandial BGs, will lower mealtime insulin as well. BG goal 100-180mg/dL. Endocrine will closely monitor BG levels.

## 2025-03-12 NOTE — DISCHARGE NOTE PROVIDER - CARE PROVIDER_API CALL
Karyn Wallis  Surgical Critical Care  [address] [place] [address], [address]  [place] [address]  Phone: [phone]  Fax: [phone]  Follow Up Time: 2 weeks   Karyn Wallis  Surgical Critical Care  [address] [place] [address], [address]  [place] [address]  Phone: [phone]  Fax: [phone]  Follow Up Time: 2 weeks    Edmundo Hsu  Urology  [address] [place] [address], [address]  [place] [address]  Phone: [phone]  Fax: [phone]  Follow Up Time: 2 weeks   Karyn Wallis  Surgical Critical Care  [address] [place] [address], [address]  [place] [address]  Phone: [phone]  Fax: [phone]  Follow Up Time: 2 weeks    Edmundo Hsu  Urology  [address] [facility], [address]  [place] [address]  Phone: [phone]  Fax: [phone]  Follow Up Time: 2 weeks    Outpatient Endocrinologist,   Phone: (   )    -  Fax: (   )    -  Follow Up Time: 1 week

## 2025-03-12 NOTE — DISCHARGE NOTE PROVIDER - HOSPITAL COURSE
59M with PMH of DM and pancreatic cancer s/p [place] in [facility] presented with perirectal pain and scrotal swelling. Initially had perirectal abscess drained as outpatient by colorectal surgeon on 3/6/25 and was prescribed Augmentin. Patient developed worsening scrotal swelling and pain and presented to ED on 3/7/25. CT showed inflammation and subcutaneous emphysema involving scrotum, perineum, perianal region, and penile shaft concerning for Pema's gangrene. Patient underwent I&D of perineum/groin and scrotal exploration on 3/8/25 followed by repeat I&D on 3/9/25. Tissue cultures grew polymicrobial organisms including E. coli, Edwardsiella, Prevotella, and Strep anginosus. Initially treated with vancomycin/zosyn/clindamycin, now transitioned to ceftriaxone and metronidazole. Patient has wound vac in place with planned OR return on 3/13/25 for wound vac change.     3/7/25:  - Presented to ED with perirectal pain, scrotal swelling after outpatient I&D of perirectal abscess  - CT showed subcutaneous emphysema in scrotum/perineum concerning for Pema's gangrene  - Started on vanc/zosyn/clinda    3/8/25:  - Taken to OR for I&D of perineum/groin and scrotal exploration  - Purulent drainage and necrotic tissue debrided  - Admitted to SICU post-op for monitoring  - Required norepinephrine for BP support    3/9/25:  - Returned to OR for second look debridement  - Small amount of fibrinous tissue removed, wound vac placed  - Weaned off pressors  - Transferred to floor    3/10/25:  - Stable on floor  - Espinoza catheter removed, passed trial of void  - Wound vac in place  - Started on carb-consistent diet  - Endocrine consulted for diabetes management  - WBC increased to 17    3/11/25:  - ID consulted, changed antibiotics to ceftriaxone and metronidazole  - Having frequent stools contaminating surgical site  - Wet to dry dressing changes started  - Insulin regimen adjusted by endocrine    3/12/25:  - Clinically improving  - Continuing wound care and antibiotics  - Stable vital signs  - WBC trending down to 12.7    Key Specialty Recommendations:    Infectious Disease:  - Complete several weeks of ceftriaxone and metronidazole  - Follow wound cultures    Endocrinology:  - Continue insulin glargine 26u qhs  - Admelog 5u AC with meals  - Check blood glucose TID and HS  - Follow up with outpatient endocrinologist  - Consider CGM at discharge 59M with PMH of DM and pancreatic cancer s/p [place] in [facility] presented with perirectal pain and scrotal swelling. Initially had perirectal abscess drained as outpatient by colorectal surgeon on 3/6/25 and was prescribed Augmentin. Patient developed worsening scrotal swelling and pain and presented to ED on 3/7/25. CT showed inflammation and subcutaneous emphysema involving scrotum, perineum, perianal region, and penile shaft concerning for Pema's gangrene. Patient underwent I&D of perineum/groin and scrotal exploration on 3/8/25 followed by repeat I&D on 3/9/25. Tissue cultures grew polymicrobial organisms including E. coli, Edwardsiella, Prevotella, and Strep anginosus. Initially treated with vancomycin/zosyn/clindamycin, now transitioned to ceftriaxone and metronidazole. Patient has wound vac in place with planned OR return on 3/13/25 for wound vac change.     3/7/25:  - Presented to ED with perirectal pain, scrotal swelling after outpatient I&D of perirectal abscess  - CT showed subcutaneous emphysema in scrotum/perineum concerning for Pema's gangrene  - Started on vanc/zosyn/clinda    3/8/25:  - Taken to OR for I&D of perineum/groin and scrotal exploration  - Purulent drainage and necrotic tissue debrided  - Admitted to SICU post-op for monitoring  - Required norepinephrine for BP support    3/9/25:  - Returned to OR for second look debridement  - Small amount of fibrinous tissue removed, wound vac placed  - Weaned off pressors  - Transferred to floor    3/10/25:  - Stable on floor  - Espinoza catheter removed, passed trial of void  - Wound vac in place  - Started on carb-consistent diet  - Endocrine consulted for diabetes management  - WBC increased to 17    3/11/25:  - ID consulted, changed antibiotics to ceftriaxone and metronidazole  - Having frequent stools contaminating surgical site  - Wet to dry dressing changes started  - Insulin regimen adjusted by endocrine    3/12/25:  - Clinically improving  - Continuing wound care and antibiotics  - Stable vital signs  - WBC trending down to 12.7    Key Specialty Recommendations:    Infectious Disease:  - Complete several weeks of ceftriaxone and metronidazole  - Follow wound cultures    Endocrinology:  - Continue insulin glargine 26u qhs  - Admelog 5u AC with meals  - Check blood glucose TID and HS  - Follow up with outpatient endocrinologist  - Consider CGM at discharge    Incidental Findings:  CT Abdomen/Pelvis (3/7/25):  - 1.1cm indeterminate liver lesion in segment 7  - Atrophic pancreas with scattered gas in pancreatic duct  - Bladder wall thickening with mucosal enhancement  - Previously imaged lung nodule 59M with PMH of DM and pancreatic cancer s/p [place] in [facility] presented with perirectal pain and scrotal swelling. Initially had perirectal abscess drained as outpatient by colorectal surgeon on 3/6/25 and was prescribed Augmentin. Patient developed worsening scrotal swelling and pain and presented to ED on 3/7/25. CT showed inflammation and subcutaneous emphysema involving scrotum, perineum, perianal region, and penile shaft concerning for Pema's gangrene. Patient underwent I&D of perineum/groin and scrotal exploration on 3/8/25 followed by repeat I&D on 3/9/25. Tissue cultures grew polymicrobial organisms including E. coli, Edwardsiella, Prevotella, and Strep anginosus. Initially treated with vancomycin/zosyn/clindamycin, now transitioned to ceftriaxone and metronidazole. Patient has wound vac in place with planned OR return on 3/13/25 for wound vac change.     3/7/25:  - Presented to ED with perirectal pain, scrotal swelling after outpatient I&D of perirectal abscess  - CT showed subcutaneous emphysema in scrotum/perineum concerning for Pema's gangrene  - Started on vanc/zosyn/clinda    3/8/25:  - Taken to OR for I&D of perineum/groin and scrotal exploration  - Purulent drainage and necrotic tissue debrided  - Admitted to SICU post-op for monitoring  - Required norepinephrine for BP support    3/9/25:  - Returned to OR for second look debridement  - Small amount of fibrinous tissue removed, wound vac placed  - Weaned off pressors  - Transferred to floor    3/10/25:  - Stable on floor  - Espinoza catheter removed, passed trial of void  - Wound vac in place  - Started on carb-consistent diet  - Endocrine consulted for diabetes management  - WBC increased to 17    3/11/25:  - ID consulted, changed antibiotics to ceftriaxone and metronidazole  - Having frequent stools contaminating surgical site  - Wet to dry dressing changes started  - Insulin regimen adjusted by endocrine    3/12/25:  - Clinically improving  - Continuing wound care and antibiotics  - Stable vital signs  - WBC trending down to 12.7    Key Specialty Recommendations:    Infectious Disease:  - Complete several weeks of ceftriaxone and metronidazole  - Follow wound cultures    Endocrinology:  - Continue insulin glargine 26u qhs  - Admelog 5u AC with meals  - Check blood glucose TID and HS  - Follow up with outpatient endocrinologist  - Consider CGM at discharge   59M with PMH of DM and pancreatic cancer s/p [place] in [facility] presented with perirectal pain and scrotal swelling. Initially had perirectal abscess drained as outpatient by colorectal surgeon on 3/6/25 and was prescribed Augmentin. Patient developed worsening scrotal swelling and pain and presented to ED on 3/7/25. CT showed inflammation and subcutaneous emphysema involving scrotum, perineum, perianal region, and penile shaft concerning for Pema's gangrene. Patient underwent I&D of perineum/groin and scrotal exploration on 3/8/25 followed by repeat I&D on 3/9/25. Tissue cultures grew polymicrobial organisms including E. coli, Edwardsiella, Prevotella, and Strep anginosus. Initially treated with vancomycin/zosyn/clindamycin, now transitioned to ceftriaxone and metronidazole.     3/7/25:  - Presented to ED with perirectal pain, scrotal swelling after outpatient I&D of perirectal abscess  - CT showed subcutaneous emphysema in scrotum/perineum concerning for Pema's gangrene  - Started on vanc/zosyn/clinda  3/8/25:  - Taken to OR for I&D of perineum/groin and scrotal exploration  - Purulent drainage and necrotic tissue debrided  - Admitted to SICU post-op for monitoring  - Required norepinephrine for BP support  3/9/25:  - Returned to OR for second look debridement  - Small amount of fibrinous tissue removed, wound vac placed  - Weaned off pressors  - Transferred to floor  3/10/25:  - Stable on floor  - Espinoza catheter removed, passed trial of void  - Wound vac in place  - Started on carb-consistent diet  - Endocrine consulted for diabetes management  - WBC increased to 17  3/11/25:  - ID consulted, changed antibiotics to ceftriaxone and metronidazole  - Having frequent stools contaminating surgical site  - Wet to dry dressing changes started  3/12/25  - Continued wet to dry dressing changes for Pema's gangrene  - ID following, continued on Flagyl and IV rocephin  3/13/25  - Stable vital signs, afebrile  - ID recommended continuing antibiotics for several weeks  - Tolerating PO intake  3/14/25  Wound care consult recommended Triad BID for scrotum and NPWT for right groin  Recommended Group 2 mattress and ROHO cushion for discharge  ID continued antibiotics, noted polymicrobial infection  Endocrine increased Lantus to 22u QHS, Admelog to 7u TID  3/15/25  Endocrine increased Lantus to 26u QHS, Admelog to 9u TID for better glycemic control  Ambulating well, tolerating diet  3/16/25  Passing flatus and having bowel movements  3/17/25  Tolerating diet, denies N/V   59M with PMH of DM and pancreatic cancer s/p [place] in [facility] presented with perirectal pain and scrotal swelling. Initially had perirectal abscess drained as outpatient by colorectal surgeon on 3/6/25 and was prescribed Augmentin. Patient developed worsening scrotal swelling and pain and presented to ED on 3/7/25. CT showed inflammation and subcutaneous emphysema involving scrotum, perineum, perianal region, and penile shaft concerning for Pema's gangrene. Patient underwent I&D of perineum/groin and scrotal exploration on 3/8/25 followed by repeat I&D on 3/9/25. Tissue cultures grew polymicrobial organisms including E. coli, Edwardsiella, Prevotella, and Strep anginosus. Initially treated with vancomycin/zosyn/clindamycin, now transitioned to ceftriaxone and metronidazole.     3/7/25:  - Presented to ED with perirectal pain, scrotal swelling after outpatient I&D of perirectal abscess  - CT showed subcutaneous emphysema in scrotum/perineum concerning for Pema's gangrene  - Started on vanc/zosyn/clinda  3/8/25:  - Taken to OR for I&D of perineum/groin and scrotal exploration  - Purulent drainage and necrotic tissue debrided  - Admitted to SICU post-op for monitoring  - Required norepinephrine for BP support  3/9/25:  - Returned to OR for second look debridement  - Small amount of fibrinous tissue removed, wound vac placed  - Weaned off pressors  - Transferred to floor  3/10/25:  - Stable on floor  - Espinoza catheter removed, passed trial of void  - Wound vac in place  - Started on carb-consistent diet  - Endocrine consulted for diabetes management  - WBC increased to 17  3/11/25:  - ID consulted, changed antibiotics to ceftriaxone and metronidazole  - Having frequent stools contaminating surgical site  - Wet to dry dressing changes started  3/12/25  - Continued wet to dry dressing changes for Pema's gangrene  - ID following, continued on Flagyl and IV rocephin  3/13/25  - Stable vital signs, afebrile  - ID recommended continuing antibiotics for several weeks  - Tolerating PO intake  3/14/25  Wound care consult recommended Triad BID for scrotum and NPWT for right groin  Recommended Group 2 mattress and ROHO cushion for discharge  ID continued antibiotics, noted polymicrobial infection  Endocrine increased Lantus to 22u QHS, Admelog to 7u TID  3/15/25  Endocrine increased Lantus to 26u QHS, Admelog to 9u TID for better glycemic control  Ambulating well, tolerating diet  3/16/25  Passing flatus and having bowel movements  3/17/25  Tolerating diet, denies N/V  3/18/25  Tolerating diet, denies N/V    On day of discharge, pt stable, ambulating, voiding, +bowel function, on metronidazole and ceftriaxone   Pt should f/u w/ ACS in 1-2 weeks and urology in 1-2 weeks after discharge    59M with PMH of DM and pancreatic cancer s/p [place] in [facility] presented with perirectal pain and scrotal swelling. Initially had perirectal abscess drained as outpatient by colorectal surgeon on 3/6/25 and was prescribed Augmentin. Patient developed worsening scrotal swelling and pain and presented to ED on 3/7/25. CT showed inflammation and subcutaneous emphysema involving scrotum, perineum, perianal region, and penile shaft concerning for Pema's gangrene. Patient underwent I&D of perineum/groin and scrotal exploration on 3/8/25 followed by repeat I&D on 3/9/25. Tissue cultures grew polymicrobial organisms including E. coli, Edwardsiella, Prevotella, and Strep anginosus. Initially treated with vancomycin/zosyn/clindamycin, now transitioned to ceftriaxone and metronidazole.     3/7/25:  - Presented to ED with perirectal pain, scrotal swelling after outpatient I&D of perirectal abscess  - CT showed subcutaneous emphysema in scrotum/perineum concerning for Pema's gangrene  - Started on vanc/zosyn/clinda  3/8/25:  - Taken to OR for I&D of perineum/groin and scrotal exploration  - Purulent drainage and necrotic tissue debrided  - Admitted to SICU post-op for monitoring  - Required norepinephrine for BP support  3/9/25:  - Returned to OR for second look debridement  - Small amount of fibrinous tissue removed, wound vac placed  - Weaned off pressors  - Transferred to floor  3/10/25:  - Stable on floor  - Espinoza catheter removed, passed trial of void  - Wound vac in place  - Started on carb-consistent diet  - Endocrine consulted for diabetes management  - WBC increased to 17  3/11/25:  - ID consulted, changed antibiotics to ceftriaxone and metronidazole  - Having frequent stools contaminating surgical site  - Wet to dry dressing changes started  3/12/25  - Continued wet to dry dressing changes for Pema's gangrene  - ID following, continued on Flagyl and IV rocephin  3/13/25  - Stable vital signs, afebrile  - ID recommended continuing antibiotics for several weeks  - Tolerating PO intake  3/14/25  Wound care consult recommended Triad BID for scrotum and NPWT for right groin  Recommended Group 2 mattress and ROHO cushion for discharge  ID continued antibiotics, noted polymicrobial infection  Endocrine increased Lantus to 22u QHS, Admelog to 7u TID  3/15/25  Endocrine increased Lantus to 26u QHS, Admelog to 9u TID for better glycemic control  Ambulating well, tolerating diet  3/16/25  Passing flatus and having bowel movements  3/17/25  Tolerating diet, denies N/V  3/18/25  Tolerating diet, denies N/V  3/18-3/20  Patient tolerating diet. Dressing changes twice daily with wet to dry.    On day of discharge, pt stable, ambulating, voiding, +bowel function, on metronidazole and ceftriaxone   Pt should f/u w/ ACS in 1-2 weeks and urology in 1-2 weeks after discharge

## 2025-03-12 NOTE — CHART NOTE - NSCHARTNOTEFT_GEN_A_CORE
Incidental findings are findings on history, physical examination, lab work, and imaging that are not directly related to the patient's chief complaint and cause for hospital admission.     1. The incidental findings for this patient are (please list):    CT Abdomen/Pelvis (3/7/25):  - 1.1cm indeterminate liver lesion in segment 7  - Atrophic pancreas with scattered gas in pancreatic duct  - Bladder wall thickening with mucosal enhancement    2. Were these findings explained to the patient and/or their family (in the event that either the patient requests communication with their family or the patient is unable to understand or remember the findings and plan)?      3. Was a copy of the lab work/ imaging report given to the patient and/or their family?    4. Was the patient asked whether they can follow up with their PMD regarding this finding? If the patient says no, or does not have a PMD, you must identify a provider (i.e. Medicine Clinic or specialist) with whom the patient will follow up.    5. Was the finding documented on the discharge summary?  Yes Incidental findings are findings on history, physical examination, lab work, and imaging that are not directly related to the patient's chief complaint and cause for hospital admission.     1. The incidental findings for this patient are (please list):    CT Abdomen/Pelvis (3/7/25):  - 1.1cm indeterminate liver lesion in segment 7  - Atrophic pancreas with scattered gas in pancreatic duct  - Bladder wall thickening with mucosal enhancement  - Previously imaged lung nodule     2. Were these findings explained to the patient and/or their family (in the event that either the patient requests communication with their family or the patient is unable to understand or remember the findings and plan)?  yes    3. Was a copy of the lab work/ imaging report given to the patient and/or their family?  yes    4. Was the patient asked whether they can follow up with their PMD regarding this finding? If the patient says no, or does not have a PMD, you must identify a provider (i.e. Medicine Clinic or specialist) with whom the patient will follow up.  yes    5. Was the finding documented on the discharge summary?  Yes

## 2025-03-12 NOTE — DISCHARGE NOTE PROVIDER - PROVIDER TOKENS
PROVIDER:[TOKEN:[[id number]],FOLLOWUP:[2 weeks]] PROVIDER:[TOKEN:[[id number]],FOLLOWUP:[2 weeks]],PROVIDER:[TOKEN:[[id number]],FOLLOWUP:[2 weeks]] PROVIDER:[TOKEN:[[id number]],FOLLOWUP:[2 weeks]],PROVIDER:[TOKEN:[[id number]],FOLLOWUP:[2 weeks]],FREE:[LAST:[Outpatient Endocrinologist],PHONE:[(   )    -],FAX:[(   )    -],FOLLOWUP:[1 week]]

## 2025-03-12 NOTE — PROGRESS NOTE ADULT - ASSESSMENT
66 year old s/p Whipple's for pancreatic cancer .    Recent treated  for perirectal abscess admitted with selling, gasand pain in scrotum. Underwent debridement several items   cultures \  polymicrobial with E-COLI Prevotella Edwardsiella   current wd pack and doing well  non toxic    Resoling necrotizing scrotal infection Unclear if related to pancreatic cancer   will need several weeks of ab  we can change  to ceftriaxone and metronidazole for easy of administration   wd improved  packed

## 2025-03-12 NOTE — PROGRESS NOTE ADULT - SUBJECTIVE AND OBJECTIVE BOX
SURGERY DAILY PROGRESS NOTE:       Subjective / Overnight events:     No acute overnight events.      Objective:      MEDICATIONS  (STANDING):  cefTRIAXone   IVPB      cefTRIAXone   IVPB 1000 milliGRAM(s) IV Intermittent once  cefTRIAXone   IVPB 1000 milliGRAM(s) IV Intermittent every 24 hours  chlorhexidine 2% Cloths 1 Application(s) Topical <User Schedule>  enoxaparin Injectable 40 milliGRAM(s) SubCutaneous every 24 hours  insulin glargine Injectable (LANTUS) 26 Unit(s) SubCutaneous at bedtime  insulin lispro (ADMELOG) corrective regimen sliding scale   SubCutaneous at bedtime  insulin lispro (ADMELOG) corrective regimen sliding scale   SubCutaneous three times a day before meals  insulin lispro Injectable (ADMELOG) 5 Unit(s) SubCutaneous three times a day with meals  metroNIDAZOLE    Tablet 500 milliGRAM(s) Oral every 12 hours  pancrelipase  (CREON 12,000 Lipase Units) 1 Capsule(s) Oral three times a day with meals  polyethylene glycol 3350 17 Gram(s) Oral daily  senna 2 Tablet(s) Oral at bedtime    MEDICATIONS  (PRN):  acetaminophen   IVPB .. 1000 milliGRAM(s) IV Intermittent every 6 hours PRN Mild Pain (1 - 3)  dextrose Oral Gel 15 Gram(s) Oral once PRN Blood Glucose LESS THAN 70 milliGRAM(s)/deciliter  oxyCODONE    IR 2.5 milliGRAM(s) Oral every 4 hours PRN Moderate Pain (4 - 6)      Vital Signs Last 24 Hrs  T(C): 37.1 (12 Mar 2025 04:51), Max: 37.6 (11 Mar 2025 20:52)  T(F): 98.7 (12 Mar 2025 04:51), Max: 99.6 (11 Mar 2025 20:52)  HR: 52 (12 Mar 2025 04:51) (52 - 57)  BP: 144/69 (12 Mar 2025 04:51) (99/60 - 144/69)  BP(mean): --  RR: 18 (12 Mar 2025 04:51) (18 - 18)  SpO2: 98% (12 Mar 2025 04:51) (94% - 98%)    Parameters below as of 12 Mar 2025 04:51  Patient On (Oxygen Delivery Method): room air        I&O's Detail    11 Mar 2025 07:01  -  12 Mar 2025 07:00  --------------------------------------------------------  IN:    IV PiggyBack: 150 mL    Oral Fluid: 800 mL  Total IN: 950 mL    OUT:    Voided (mL): 3500 mL  Total OUT: 3500 mL    Total NET: -2550 mL          Daily     Daily     LABS:                        11.3   13.81 )-----------( 244      ( 11 Mar 2025 09:02 )             34.7     03-11    136  |  100  |  13  ----------------------------<  216[H]  3.7   |  24  |  0.84    Ca    7.6[L]      11 Mar 2025 09:02  Phos  2.5     03-11  Mg     2.0     03-11        Urinalysis Basic - ( 11 Mar 2025 09:02 )    Color: x / Appearance: x / SG: x / pH: x  Gluc: 216 mg/dL / Ketone: x  / Bili: x / Urobili: x   Blood: x / Protein: x / Nitrite: x   Leuk Esterase: x / RBC: x / WBC x   Sq Epi: x / Non Sq Epi: x / Bacteria: x          	  Physical Exam:  General: NAD, resting in bed comfortably.  Cardiac: regular rate, warm and well perfused  Respiratory: Nonlabored respirations, normal cw expansion.  Abdomen: soft, nontender, nondistended  : Dressings C/D/I  Extremities: normal strength, FROM, no deformities   Double Island Pedicle Flap Text: The defect edges were debeveled with a #15 scalpel blade.  Given the location of the defect, shape of the defect and the proximity to free margins a double island pedicle advancement flap was deemed most appropriate.  Using a sterile surgical marker, an appropriate advancement flap was drawn incorporating the defect, outlining the appropriate donor tissue and placing the expected incisions within the relaxed skin tension lines where possible.    The area thus outlined was incised deep to adipose tissue with a #15 scalpel blade.  The skin margins were undermined to an appropriate distance in all directions around the primary defect and laterally outward around the island pedicle utilizing iris scissors.  There was minimal undermining beneath the pedicle flap.

## 2025-03-12 NOTE — DISCHARGE NOTE PROVIDER - NSDCMRMEDTOKEN_GEN_ALL_CORE_FT
Creon 24,000 units oral delayed release capsule: 2 cap(s) orally 3 times a day  cyanocobalamin 1000 mcg oral tablet: 1 tab(s) orally once a day  Insulin Glargine: 26 unit(s) once a day (at bedtime)  Lipitor 20 mg oral tablet: 1 tab(s) orally once a day (at bedtime)  Oyster Ben 500 mg oral tablet: orally once a day  terazosin 1 mg oral capsule: 1 cap(s) orally once a day   cefTRIAXone: 1 gram(s) by continuous intravenous infusion every 24 hours in normal saline via midline  through 3/28/25.  Creon 24,000 units oral delayed release capsule: 2 cap(s) orally 3 times a day  cyanocobalamin 1000 mcg oral tablet: 1 tab(s) orally once a day  Insulin Glargine: 26 unit(s) once a day (at bedtime)  Lipitor 20 mg oral tablet: 1 tab(s) orally once a day (at bedtime)  melatonin 3 mg oral tablet: 1 tab(s) orally once a day (at bedtime)  metroNIDAZOLE 500 mg oral tablet: 1 tab(s) orally every 12 hours through 3/28/25.  Oyster Ben 500 mg oral tablet: orally once a day  terazosin 1 mg oral capsule: 1 cap(s) orally once a day  Tylenol 500 mg oral tablet: 2 tab(s) orally every 6 hours as needed for mild to moderate pain

## 2025-03-12 NOTE — DISCHARGE NOTE PROVIDER - NSDCFUSCHEDAPPT_GEN_ALL_CORE_FT
Joe Johnson  [facility]  GENSUR [address] [facility] R  Scheduled Appointment: 03/21/2025    Hai Waters  [facility]  PULED [address] [place] [address] R  Scheduled Appointment: 03/24/2025     Hai Waters  [facility]  [address]  Scheduled Appointment: 03/24/2025

## 2025-03-12 NOTE — DISCHARGE NOTE PROVIDER - NSDCFUADDINST_GEN_ALL_CORE_FT
The following incidental findings were found on imaging. Please follow up with your primary care physician regarding these. A paper copy was provided to you while inpatient.  CT Abdomen/Pelvis (3/7/25):  - 1.1cm indeterminate liver lesion in segment 7  - Atrophic pancreas with scattered gas in pancreatic duct  - Bladder wall thickening with mucosal enhancement  - Previously imaged lung nodule  The following incidental findings were found on imaging. Please follow up with your primary care physician regarding these. A paper copy was provided to you while inpatient.  CT Abdomen/Pelvis (3/7/25):  - 1.1cm indeterminate liver lesion in segment 7  - Atrophic pancreas with scattered gas in pancreatic duct  - Bladder wall thickening with mucosal enhancement  - Previously imaged lung nodule     Wound care:  Apply wet to dry wayne with Dakins solution three times a day to scrotal site The following incidental findings were found on imaging. Please follow up with your primary care physician regarding these. A paper copy was provided to you while inpatient.  CT Abdomen/Pelvis (3/7/25):  - 1.1cm indeterminate liver lesion in segment 7  - Atrophic pancreas with scattered gas in pancreatic duct  - Bladder wall thickening with mucosal enhancement  - Previously imaged lung nodule     Wound care:  Apply wet to dry wayne three times a day to site.

## 2025-03-12 NOTE — PROGRESS NOTE ADULT - ASSESSMENT
59M PMH DM, pancreatic cancer, s/p whipple in [facility] presents with scrotal and perineal swelling c/f Fornier's gangrene. The patient is now s/p I&D of Perineum and Groin, and Scrotal Exploration with ACS/Trauma and Urology now s/p Irrigation and debridement of perineum and groin.     Recommendations  - Wet to dry dressing changes  - ID consulted and recommend Flagyl and IV rocephin   - Endo following, appreciate recs   - DVT ppx: Lvx  - Diet: CC  - pain control PRN       ACS/Trauma Surgery  [id number]

## 2025-03-13 LAB
BUN SERPL-MCNC: 13 MG/DL — SIGNIFICANT CHANGE UP (ref 7–23)
CALCIUM SERPL-MCNC: 8.5 MG/DL — SIGNIFICANT CHANGE UP (ref 8.4–10.5)
CHLORIDE SERPL-SCNC: 105 MMOL/L — SIGNIFICANT CHANGE UP (ref 96–108)
CO2 SERPL-SCNC: 21 MMOL/L — LOW (ref 22–31)
CREAT SERPL-MCNC: 0.86 MG/DL — SIGNIFICANT CHANGE UP (ref 0.5–1.3)
EGFR: 100 ML/MIN/1.73M2 — SIGNIFICANT CHANGE UP
EGFR: 100 ML/MIN/1.73M2 — SIGNIFICANT CHANGE UP
GLUCOSE BLDC GLUCOMTR-MCNC: 163 MG/DL — HIGH (ref 70–99)
GLUCOSE BLDC GLUCOMTR-MCNC: 176 MG/DL — HIGH (ref 70–99)
GLUCOSE BLDC GLUCOMTR-MCNC: 206 MG/DL — HIGH (ref 70–99)
GLUCOSE BLDC GLUCOMTR-MCNC: 238 MG/DL — HIGH (ref 70–99)
GLUCOSE SERPL-MCNC: 182 MG/DL — HIGH (ref 70–99)
HCT VFR BLD CALC: 36.6 % — LOW (ref 39–50)
HGB BLD-MCNC: 11.9 G/DL — LOW (ref 13–17)
MAGNESIUM SERPL-MCNC: 2.6 MG/DL — SIGNIFICANT CHANGE UP (ref 1.6–2.6)
MCHC RBC-ENTMCNC: 26.2 PG — LOW (ref 27–34)
MCHC RBC-ENTMCNC: 32.5 G/DL — SIGNIFICANT CHANGE UP (ref 32–36)
MCV RBC AUTO: 80.4 FL — SIGNIFICANT CHANGE UP (ref 80–100)
NRBC BLD AUTO-RTO: 0 /100 WBCS — SIGNIFICANT CHANGE UP (ref 0–0)
PHOSPHATE SERPL-MCNC: 3 MG/DL — SIGNIFICANT CHANGE UP (ref 2.5–4.5)
PLATELET # BLD AUTO: 394 K/UL — SIGNIFICANT CHANGE UP (ref 150–400)
POTASSIUM SERPL-MCNC: 4.3 MMOL/L — SIGNIFICANT CHANGE UP (ref 3.5–5.3)
POTASSIUM SERPL-SCNC: 4.3 MMOL/L — SIGNIFICANT CHANGE UP (ref 3.5–5.3)
RBC # BLD: 4.55 M/UL — SIGNIFICANT CHANGE UP (ref 4.2–5.8)
RBC # FLD: 14.5 % — SIGNIFICANT CHANGE UP (ref 10.3–14.5)
SODIUM SERPL-SCNC: 138 MMOL/L — SIGNIFICANT CHANGE UP (ref 135–145)
WBC # BLD: 10.5 K/UL — SIGNIFICANT CHANGE UP (ref 3.8–10.5)
WBC # FLD AUTO: 10.5 K/UL — SIGNIFICANT CHANGE UP (ref 3.8–10.5)

## 2025-03-13 PROCEDURE — 99232 SBSQ HOSP IP/OBS MODERATE 35: CPT

## 2025-03-13 PROCEDURE — G0545: CPT

## 2025-03-13 RX ORDER — INSULIN LISPRO 100 U/ML
4 INJECTION, SOLUTION INTRAVENOUS; SUBCUTANEOUS
Refills: 0 | Status: DISCONTINUED | OUTPATIENT
Start: 2025-03-13 | End: 2025-03-14

## 2025-03-13 RX ADMIN — INSULIN LISPRO 1: 100 INJECTION, SOLUTION INTRAVENOUS; SUBCUTANEOUS at 17:44

## 2025-03-13 RX ADMIN — Medication 500 MILLIGRAM(S): at 05:20

## 2025-03-13 RX ADMIN — INSULIN LISPRO 4 UNIT(S): 100 INJECTION, SOLUTION INTRAVENOUS; SUBCUTANEOUS at 12:25

## 2025-03-13 RX ADMIN — Medication 1 CAPSULE(S): at 17:46

## 2025-03-13 RX ADMIN — INSULIN LISPRO 4 UNIT(S): 100 INJECTION, SOLUTION INTRAVENOUS; SUBCUTANEOUS at 17:45

## 2025-03-13 RX ADMIN — INSULIN GLARGINE-YFGN 22 UNIT(S): 100 INJECTION, SOLUTION SUBCUTANEOUS at 21:16

## 2025-03-13 RX ADMIN — Medication 1 APPLICATION(S): at 07:31

## 2025-03-13 RX ADMIN — INSULIN LISPRO 1: 100 INJECTION, SOLUTION INTRAVENOUS; SUBCUTANEOUS at 07:30

## 2025-03-13 RX ADMIN — POLYETHYLENE GLYCOL 3350 17 GRAM(S): 17 POWDER, FOR SOLUTION ORAL at 12:26

## 2025-03-13 RX ADMIN — INSULIN LISPRO 2 UNIT(S): 100 INJECTION, SOLUTION INTRAVENOUS; SUBCUTANEOUS at 07:30

## 2025-03-13 RX ADMIN — Medication 1 CAPSULE(S): at 07:41

## 2025-03-13 RX ADMIN — CEFTRIAXONE 100 MILLIGRAM(S): 500 INJECTION, POWDER, FOR SOLUTION INTRAMUSCULAR; INTRAVENOUS at 12:26

## 2025-03-13 RX ADMIN — Medication 500 MILLIGRAM(S): at 17:46

## 2025-03-13 RX ADMIN — INSULIN LISPRO 2: 100 INJECTION, SOLUTION INTRAVENOUS; SUBCUTANEOUS at 12:24

## 2025-03-13 RX ADMIN — Medication 1 CAPSULE(S): at 12:26

## 2025-03-13 RX ADMIN — ENOXAPARIN SODIUM 40 MILLIGRAM(S): 100 INJECTION SUBCUTANEOUS at 12:26

## 2025-03-13 NOTE — PROGRESS NOTE ADULT - SUBJECTIVE AND OBJECTIVE BOX
Patient seen today for follow up inpatient Diabetes Mellitus management.    Chief Complaint: Type 2 Diabetes Mellitus     INTERVAL HX:  Patient seen in [facility] 2MON 215 D1. Patient is alert and oriented, resting in bed. Patient is feeling good, reports eating mostly full meals and tolerating POs. FBG stable this am to 163mg/dL. No hypoglycemia. Noted postprandial hyperglycemia last evening to 234mg/dL -> patient reports eating an egg after dinner, also noted pre-meal insulin lowered given tight BG control postprandial yesterday. Patient has FSL2 on L arm, reviewed CGMs at bedside, BG 163mg/dL, BGs 100-200mg/dL. Blood glucose levels in the last 24hrs have been 94-234mg/dL.     Review of Systems:  General: As above.  Respiratory: Denies any SOB, MAKI, or cough.  Gastrointestinal: Denies any n/v/d or abdominal pain.   Endocrine: Denies any polyuria, polydipsia, polyphagia, visual changes, or numbness in feet.     Allergies  No Known Allergies      Intolerances  None.       MEDICATIONS  (STANDING):  acetaminophen   IVPB .. 1000 milliGRAM(s) IV Intermittent every 6 hours PRN  cefTRIAXone   IVPB      cefTRIAXone   IVPB 1000 milliGRAM(s) IV Intermittent every 24 hours  chlorhexidine 2% Cloths 1 Application(s) Topical <User Schedule>  dextrose Oral Gel 15 Gram(s) Oral once PRN  enoxaparin Injectable 40 milliGRAM(s) SubCutaneous every 24 hours  insulin glargine Injectable (LANTUS) 22 Unit(s) SubCutaneous at bedtime  insulin lispro (ADMELOG) corrective regimen sliding scale   SubCutaneous at bedtime  insulin lispro (ADMELOG) corrective regimen sliding scale   SubCutaneous three times a day before meals  insulin lispro Injectable (ADMELOG) 4 Unit(s) SubCutaneous three times a day with meals  metroNIDAZOLE    Tablet 500 milliGRAM(s) Oral every 12 hours  oxyCODONE    IR 2.5 milliGRAM(s) Oral every 4 hours PRN  pancrelipase  (CREON 12,000 Lipase Units) 1 Capsule(s) Oral three times a day with meals  polyethylene glycol 3350 17 Gram(s) Oral daily  senna 2 Tablet(s) Oral at bedtime      dextrose Oral Gel 15 Gram(s) Oral once PRN  insulin glargine Injectable (LANTUS) 22 Unit(s) SubCutaneous at bedtime  insulin lispro (ADMELOG) corrective regimen sliding scale   SubCutaneous at bedtime  insulin lispro (ADMELOG) corrective regimen sliding scale   SubCutaneous three times a day before meals  insulin lispro Injectable (ADMELOG) 4 Unit(s) SubCutaneous three times a day with meals      insulin lispro (ADMELOG) corrective regimen sliding scale   SubCutaneous at bedtime  insulin lispro (ADMELOG) corrective regimen sliding scale   SubCutaneous three times a day before meals  insulin lispro Injectable (ADMELOG) 4 Unit(s) SubCutaneous three times a day with meals      PHYSICAL EXAM:  VITALS:   T(C): 36.8 (03-13-25 @ 07:51), Max: 37.1 (03-12-25 @ 17:16)  HR: 75 (03-13-25 @ 07:51) (60 - 77)  BP: 117/74 (03-13-25 @ 07:51) (108/75 - 128/75)  RR: 18 (03-13-25 @ 07:51) (18 - 18)  SpO2: 97% (03-13-25 @ 07:51) (96% - 99%)    GENERAL: In no acute distress  Respiratory: Respirations unlabored  Extremities: Warm and dry, no edema  NEURO: Alert and oriented, appropriate     LABS:  POCT Blood Glucose.: 163 mg/dL (03-13-25 @ 07:23)  POCT Blood Glucose.: 234 mg/dL (03-12-25 @ 22:11)  POCT Blood Glucose.: 125 mg/dL (03-12-25 @ 17:12)  POCT Blood Glucose.: 94 mg/dL (03-12-25 @ 12:18)  POCT Blood Glucose.: 75 mg/dL (03-12-25 @ 09:33)  POCT Blood Glucose.: 179 mg/dL (03-11-25 @ 21:01)  POCT Blood Glucose.: 193 mg/dL (03-11-25 @ 17:29)  POCT Blood Glucose.: 77 mg/dL (03-11-25 @ 13:43)  POCT Blood Glucose.: 170 mg/dL (03-11-25 @ 09:04)  POCT Blood Glucose.: 273 mg/dL (03-10-25 @ 21:56)  POCT Blood Glucose.: 246 mg/dL (03-10-25 @ 19:41)  POCT Blood Glucose.: 251 mg/dL (03-10-25 @ 18:08)  POCT Blood Glucose.: 284 mg/dL (03-10-25 @ 13:52)                          11.9   10.50 )-----------( 394      ( 13 Mar 2025 06:20 )             36.6     03-13    138  |  105  |  13  ----------------------------<  182[H]  4.3   |  21[L]  |  0.86    Ca    8.5      13 Mar 2025 06:20  Phos  3.0     03-13  Mg     2.6     03-13        Urinalysis Basic - ( 13 Mar 2025 06:20 )    Color: x / Appearance: x / SG: x / pH: x  Gluc: 182 mg/dL / Ketone: x  / Bili: x / Urobili: x   Blood: x / Protein: x / Nitrite: x   Leuk Esterase: x / RBC: x / WBC x   Sq Epi: x / Non Sq Epi: x / Bacteria: x      A1C with Estimated Average Glucose Result: A1C with Estimated Average Glucose Result: 9.3 % (03-08-25 @ 05:35)

## 2025-03-13 NOTE — PROGRESS NOTE ADULT - ASSESSMENT
COLONOSCOPY  Progress Note    Patti Hogue  3/21/2022    Pre-op Diagnosis:   Irritable bowel syndrome with diarrhea [K58.0]       Post-Op Diagnosis Codes:     * Irritable bowel syndrome with diarrhea [K58.0]     * Internal hemorrhoids [K64.8]    Procedure/CPT® Codes:        Procedure(s):  COLONOSCOPY to cecum and TI with biopsies    Surgeon(s):  Tuan Blanco MD    Anesthesia: Monitored Anesthesia Care    Staff:   Endo Technician: Tamar Juarez  Endo Nurse: Carolin Perdomo RN         Estimated Blood Loss: minimal    Urine Voided: * No values recorded between 3/21/2022  1:03 PM and 3/21/2022  1:29 PM *    Specimens:                Specimens     ID Source Type Tests Collected By Collected At Frozen?    A Large Intestine, Left / Descending Colon Tissue · TISSUE PATHOLOGY EXAM   Tuan Blanco MD 3/21/22 1627     Description: bx    Comment: forceps    This specimen was not marked as sent.    B Large Intestine, Rectum Tissue · TISSUE PATHOLOGY EXAM   Tuan Blanco MD 3/21/22 4851     Description: bx    Comment: forceps    This specimen was not marked as sent.                Drains: * No LDAs found *    Findings: Colonoscopy the terminal ileum with normal mucosa.  Internal hemorrhoids were identified.  Biopsies of the descending and rectum obtained to rule out microscopic colitis.    Complications: None          Tuan Blanco MD     Date: 3/21/2022  Time: 13:30 EDT       59M PMH DM, pancreatic cancer, s/p whipple in [facility] presents with scrotal and perineal swelling c/f Fornier's gangrene. The patient is now s/p I&D of Perineum and Groin, and Scrotal Exploration with ACS/Trauma and Urology now s/p Irrigation and debridement of perineum and groin.     Recommendations  - Wet to dry dressing changes  - Wound care consult   - ID consulted and recommend Flagyl and IV rocephin   - Endo following, appreciate recs   - DVT ppx: Lvx  - Diet: CC  - pain control PRN         ACS/Trauma Surgery  [id number]

## 2025-03-13 NOTE — PROGRESS NOTE ADULT - SUBJECTIVE AND OBJECTIVE BOX
[facility] Division of Hospital Medicine  Raven Land MD  Available via MS Teams    SUBJECTIVE / OVERNIGHT EVENTS:  no acute events   having some perineal pain on exam   having BMs, urinating well, no CP or SOB, no abd pain      ADDITIONAL REVIEW OF SYSTEMS:  14 point ROS negative except as noted above     MEDICATIONS  (STANDING):  cefTRIAXone   IVPB      cefTRIAXone   IVPB 1000 milliGRAM(s) IV Intermittent every 24 hours  chlorhexidine 2% Cloths 1 Application(s) Topical <User Schedule>  enoxaparin Injectable 40 milliGRAM(s) SubCutaneous every 24 hours  insulin glargine Injectable (LANTUS) 22 Unit(s) SubCutaneous at bedtime  insulin lispro (ADMELOG) corrective regimen sliding scale   SubCutaneous at bedtime  insulin lispro (ADMELOG) corrective regimen sliding scale   SubCutaneous three times a day before meals  insulin lispro Injectable (ADMELOG) 4 Unit(s) SubCutaneous three times a day with meals  metroNIDAZOLE    Tablet 500 milliGRAM(s) Oral every 12 hours  pancrelipase  (CREON 12,000 Lipase Units) 1 Capsule(s) Oral three times a day with meals  polyethylene glycol 3350 17 Gram(s) Oral daily  senna 2 Tablet(s) Oral at bedtime    MEDICATIONS  (PRN):  acetaminophen   IVPB .. 1000 milliGRAM(s) IV Intermittent every 6 hours PRN Mild Pain (1 - 3)  dextrose Oral Gel 15 Gram(s) Oral once PRN Blood Glucose LESS THAN 70 milliGRAM(s)/deciliter  oxyCODONE    IR 2.5 milliGRAM(s) Oral every 4 hours PRN Moderate Pain (4 - 6)      I&O's Summary    12 Mar 2025 07:01  -  13 Mar 2025 07:00  --------------------------------------------------------  IN: 560 mL / OUT: 4350 mL / NET: -3790 mL    13 Mar 2025 07:01  -  13 Mar 2025 12:17  --------------------------------------------------------  IN: 240 mL / OUT: 200 mL / NET: 40 mL        PHYSICAL EXAM:  Vital Signs Last 24 Hrs  T(C): 36.8 (13 Mar 2025 12:15), Max: 37.1 (12 Mar 2025 17:16)  T(F): 98.3 (13 Mar 2025 12:15), Max: 98.8 (13 Mar 2025 00:23)  HR: 65 (13 Mar 2025 12:15) (60 - 77)  BP: 99/66 (13 Mar 2025 12:15) (99/66 - 128/75)  BP(mean): --  RR: 18 (13 Mar 2025 12:15) (18 - 18)  SpO2: 97% (13 Mar 2025 12:15) (96% - 99%)    Parameters below as of 13 Mar 2025 12:15  Patient On (Oxygen Delivery Method): room air      PHYSICAL EXAM:  GENERAL: NAD, well-developed  HEAD:  Atraumatic, normocephalic  EYES: EOMI, conjunctiva and sclera clear  NECK: Supple, no JVD  CHEST/LUNG: Clear to auscultation bilaterally; no wheezing or rales  HEART: Regular rate and rhythm; no murmurs, no LE edema   ABDOMEN: Soft, nontender, nondistended; bowel sounds present  EXTREMITIES:  2+ Peripheral Pulses, no clubbing, cyanosis  PSYCH: AAOx3, calm affect, not anxious  SKIN: No rashes or lesions      LABS:                        11.9   10.50 )-----------( 394      ( 13 Mar 2025 06:20 )             36.6     03-13    138  |  105  |  13  ----------------------------<  182[H]  4.3   |  21[L]  |  0.86    Ca    8.5      13 Mar 2025 06:20  Phos  3.0     03-13  Mg     2.6     03-13            Urinalysis Basic - ( 13 Mar 2025 06:20 )    Color: x / Appearance: x / SG: x / pH: x  Gluc: 182 mg/dL / Ketone: x  / Bili: x / Urobili: x   Blood: x / Protein: x / Nitrite: x   Leuk Esterase: x / RBC: x / WBC x   Sq Epi: x / Non Sq Epi: x / Bacteria: x            RADIOLOGY & ADDITIONAL TESTS:  New Imaging Personally Reviewed Today:  New Electrocardiogram Personally Reviewed Today:  Other Results Reviewed Today:   Prior or Outpatient Records Reviewed Today with Summary:    COORDINATION OF CARE:  Consultant Communication and Details of Discussion (where applicable):

## 2025-03-13 NOTE — PROGRESS NOTE ADULT - SUBJECTIVE AND OBJECTIVE BOX
infectious diseases progress note:    Patient is a 59y old  Male who presents with a chief complaint of wd (12 Mar 2025 08:23)        Pema's gangrene         Allergies    No Known Allergies    Intolerances        ANTIBIOTICS/RELEVANT:  antimicrobials  cefTRIAXone   IVPB      cefTRIAXone   IVPB 1000 milliGRAM(s) IV Intermittent every 24 hours  metroNIDAZOLE    Tablet 500 milliGRAM(s) Oral every 12 hours    immunologic:    OTHER:  acetaminophen   IVPB .. 1000 milliGRAM(s) IV Intermittent every 6 hours PRN  chlorhexidine 2% Cloths 1 Application(s) Topical <User Schedule>  dextrose Oral Gel 15 Gram(s) Oral once PRN  enoxaparin Injectable 40 milliGRAM(s) SubCutaneous every 24 hours  insulin glargine Injectable (LANTUS) 22 Unit(s) SubCutaneous at bedtime  insulin lispro (ADMELOG) corrective regimen sliding scale   SubCutaneous at bedtime  insulin lispro (ADMELOG) corrective regimen sliding scale   SubCutaneous three times a day before meals  insulin lispro Injectable (ADMELOG) 2 Unit(s) SubCutaneous three times a day with meals  oxyCODONE    IR 2.5 milliGRAM(s) Oral every 4 hours PRN  pancrelipase  (CREON 12,000 Lipase Units) 1 Capsule(s) Oral three times a day with meals  polyethylene glycol 3350 17 Gram(s) Oral daily  senna 2 Tablet(s) Oral at bedtime      Objective:  Vital Signs Last 24 Hrs  T(C): 36.8 (13 Mar 2025 07:51), Max: 37.1 (12 Mar 2025 17:16)  T(F): 98.3 (13 Mar 2025 07:51), Max: 98.8 (13 Mar 2025 00:23)  HR: 75 (13 Mar 2025 07:51) (60 - 77)  BP: 117/74 (13 Mar 2025 07:51) (108/75 - 128/75)  BP(mean): --  RR: 18 (13 Mar 2025 07:51) (18 - 18)  SpO2: 97% (13 Mar 2025 07:51) (96% - 99%)    Parameters below as of 13 Mar 2025 07:51  Patient On (Oxygen Delivery Method): room air         Ear/Nose/Throat: no oral lesion, no sinus tenderness on percussion	  Neck:no JVD, no lymphadenopathy, supple  Respiratory: CTA elena  Cardiovascular: S1S2 RRR, no murmurs  Gastrointestinal:soft, (+) BS, no HSM  Extremities:no e/e/c        LABS:                        11.9   10.50 )-----------( 394      ( 13 Mar 2025 06:20 )             36.6     03-13    138  |  105  |  13  ----------------------------<  182[H]  4.3   |  21[L]  |  0.86    Ca    8.5      13 Mar 2025 06:20  Phos  3.0     03-13  Mg     2.6     03-13        Urinalysis Basic - ( 13 Mar 2025 06:20 )    Color: x / Appearance: x / SG: x / pH: x  Gluc: 182 mg/dL / Ketone: x  / Bili: x / Urobili: x   Blood: x / Protein: x / Nitrite: x   Leuk Esterase: x / RBC: x / WBC x   Sq Epi: x / Non Sq Epi: x / Bacteria: x          MICROBIOLOGY:    RECENT CULTURES:  03-08 @ 06:05 Swab Perineal Wound Culture (Swab)                Culture yields growth of greater than 3 colony types of  bacteria,  which may indicate contamination and normal deangelo  Call client services within 7 days if further workup is clinically  indicated.    03-08 @ 05:59 Tissue Perineal Wound Culture (Tissue)   DONIS    Few polymorphonuclear leukocytes per low power field  Moderate Gram Negative Rods per oil power field  Moderate Gram Positive Rods per oil power field  Moderate Gram positive cocci in pairs per oil power field    Escherichia coli  Escherichia coli  Edwardsiella tarda  Escherichia coli     Moderate Escherichia coli Multiple Morphological Strains  Moderate Edwardsiella tarda  Moderate Streptococcus anginosus "Susceptibilities not performed"  Few Bacteroides ovatus group "Susceptibilities not performed"  Moderate Prevotella baroniae "Susceptibilities not performed"    03-07 @ 23:48 Clean Catch Clean Catch (Midstream)                No growth          RESPIRATORY CULTURES:              RADIOLOGY & ADDITIONAL STUDIES:        Pager [phone]  After 5 pm/weekends or if no response :[phone]

## 2025-03-13 NOTE — PROGRESS NOTE ADULT - SUBJECTIVE AND OBJECTIVE BOX
SURGERY DAILY PROGRESS NOTE    Overnight Events: No acute events overnight. Pt had dressing changed in early evening after a BM.     SUBJECTIVE: Patient seen and evaluated on AM rounds. Pt is resting comfortably in bed.  -----------------------------------------------------------------------------------------------------------------------------------------------------------------------------------------------------------  OBJECTIVE:  Vital Signs Last 24 Hrs  T(C): 36.8 (13 Mar 2025 07:51), Max: 37.1 (12 Mar 2025 17:16)  T(F): 98.3 (13 Mar 2025 07:51), Max: 98.8 (13 Mar 2025 00:23)  HR: 75 (13 Mar 2025 07:51) (60 - 77)  BP: 117/74 (13 Mar 2025 07:51) (108/75 - 128/75)  BP(mean): --  RR: 18 (13 Mar 2025 07:51) (18 - 18)  SpO2: 97% (13 Mar 2025 07:51) (96% - 99%)    Parameters below as of 13 Mar 2025 07:51  Patient On (Oxygen Delivery Method): room air      I&O's Detail    12 Mar 2025 07:01  -  13 Mar 2025 07:00  --------------------------------------------------------  IN:    Oral Fluid: 560 mL  Total IN: 560 mL    OUT:    Voided (mL): 4350 mL  Total OUT: 4350 mL    Total NET: -3790 mL      13 Mar 2025 07:01  -  13 Mar 2025 10:34  --------------------------------------------------------  IN:    Oral Fluid: 240 mL  Total IN: 240 mL    OUT:    Voided (mL): 200 mL  Total OUT: 200 mL    Total NET: 40 mL        Daily     Daily   MEDICATIONS  (STANDING):  cefTRIAXone   IVPB      cefTRIAXone   IVPB 1000 milliGRAM(s) IV Intermittent every 24 hours  chlorhexidine 2% Cloths 1 Application(s) Topical <User Schedule>  enoxaparin Injectable 40 milliGRAM(s) SubCutaneous every 24 hours  insulin glargine Injectable (LANTUS) 22 Unit(s) SubCutaneous at bedtime  insulin lispro (ADMELOG) corrective regimen sliding scale   SubCutaneous at bedtime  insulin lispro (ADMELOG) corrective regimen sliding scale   SubCutaneous three times a day before meals  insulin lispro Injectable (ADMELOG) 4 Unit(s) SubCutaneous three times a day with meals  metroNIDAZOLE    Tablet 500 milliGRAM(s) Oral every 12 hours  pancrelipase  (CREON 12,000 Lipase Units) 1 Capsule(s) Oral three times a day with meals  polyethylene glycol 3350 17 Gram(s) Oral daily  senna 2 Tablet(s) Oral at bedtime    MEDICATIONS  (PRN):  acetaminophen   IVPB .. 1000 milliGRAM(s) IV Intermittent every 6 hours PRN Mild Pain (1 - 3)  dextrose Oral Gel 15 Gram(s) Oral once PRN Blood Glucose LESS THAN 70 milliGRAM(s)/deciliter  oxyCODONE    IR 2.5 milliGRAM(s) Oral every 4 hours PRN Moderate Pain (4 - 6)      PHYSICAL EXAM:  General: NAD, resting comfortably in bed  HEENT: Normocephalic atraumatic  Respiratory: Nonlabored respirations  : dressing c/d/i  Neuro: awake, alert and answering questions appropriately     LABS:                        11.9   10.50 )-----------( 394      ( 13 Mar 2025 06:20 )             36.6     03-13    138  |  105  |  13  ----------------------------<  182[H]  4.3   |  21[L]  |  0.86    Ca    8.5      13 Mar 2025 06:20  Phos  3.0     03-13  Mg     2.6     03-13        Urinalysis Basic - ( 13 Mar 2025 06:20 )    Color: x / Appearance: x / SG: x / pH: x  Gluc: 182 mg/dL / Ketone: x  / Bili: x / Urobili: x   Blood: x / Protein: x / Nitrite: x   Leuk Esterase: x / RBC: x / WBC x   Sq Epi: x / Non Sq Epi: x / Bacteria: x                RADIOLOGY:

## 2025-03-13 NOTE — ADVANCED PRACTICE NURSE CONSULT - REASON FOR CONSULT
Vascular Access Team    Evaluation for: Bedside Midline placement  Requested by name: Saadia Grimes  Date/Time: 3/13@16:13    Indication: ceftriaxone and metronidazole  Allergy: nka     Anticoagulants/ antiplatelets: lovenox 40mg    Platelets(>20): 394  INR(<3): 1.46  eGFR(>40): 100  Blood cultures sent: n/a    Arm restrictions: no    Consent obtained: n/a    Comments: Bedside midline order evaluated. Please, call [phone] for the VAT RN with any questions.

## 2025-03-13 NOTE — PROGRESS NOTE ADULT - ASSESSMENT
59M PMH DM, pancreatic cancer, s/p whipple in [facility] presents with scrotal and perineal swelling c/f Fornier's gangrene, s/p I&D of perineum, groin and scrotal exploration (3/8), followed by irrigation and debridement of perineum and groin (3/9).

## 2025-03-13 NOTE — PROGRESS NOTE ADULT - PROBLEM SELECTOR PLAN 4
VTE ppx - lovenox   Diet - CC diet    Med management:  resume home med terazosin 1mg (or inpt equivalent).

## 2025-03-13 NOTE — PROGRESS NOTE ADULT - ASSESSMENT
59y Male with hx of T2DM, HTN, HLD, pancreatic adenocarcinoma s/p juan (2017), presents with scrotal and perineal swelling. 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 Endocrine was consulted for T2DM. Patient is feeling okay, eating mostly full meals now and tolerating POs. Patient is s/p I&D and exploration done 10/9. Patient remains on IV abx. FBG stable, no hypoglycemia. Noted need for higher amount of mealtime insulin, will slightly increase for tighter BG control. Endocrine will closely monitor BG and adjust insulin as needed for BG goal 100-180mg/dL inpatient.     # T2DM with hyperglycemia   - Most recent Hemoglobin A1C 9.3%  - At home, on lantus 26 units and lispro 10-12 units premeals  - Current FS ranges from low-mid 200 with improved appetite; uses FSL2 with reader  C-Peptide, Serum: 2.2 ng/mL (11.16.18 @ 12:10)  C-Peptide, Serum: 0.2 ng/mL (03.11.25 @ 13:04)

## 2025-03-13 NOTE — ADVANCED PRACTICE NURSE CONSULT - ASSESSMENT
Midline Catheter Insertion Note    Indication: long term antibiotics  Catheter type: 4F  : Bard  Power injectable: Yes  LOT# [id number]                                                                                                                                                                                                                   Procedure assisted by: JAMIE Islas RN  Time out was performed, confirming the patient's first and last name, date of birth, procedure, and correct site prior to state of procedure.    Patient was placed with HOB 30 degrees. A mask provided for patient. Patient placement site was prepped with chlorhexidine solution, then draped using maximum sterile barrier protection. The area was injected with 2 ml of 1% lidocaine. Using the Bard Site Rite 8, the catheter was placed using the Modified Seldinger Technique. Strict adherence to outline aseptic technique including handwashing, glove and gown, utilizing mask and cap, plus draping the patient with a sterile drape was observed. Upon completion of line placement, the insertion site was covered with a sterile occlusive CHG dressing. Pt tolerated procedure well.     Site: New  Anatomical Site of insertion: Left basilic vein  Catheter Size: 4F, 20cm  US guided Bard single lumen power midline placed.    All materials used for catheter insertion, including the intact guide wires, were accounted for at the end of the procedure.  Number of attempts: 1  Complications/Comments: None  Emergency Placement: No    Post procedure verbal instructions given to the patient or patient representative. Patient or patient representative  instructed regarding signs and symptoms of infection. Patient instructed to keep dressing dry and clean. Care for catheter as per hospital protocols

## 2025-03-13 NOTE — PROGRESS NOTE ADULT - PROBLEM SELECTOR PLAN 1
a/w Pema's gangrene of the perineum/scrotum   - s/p I+D and exploration 3/8  - s/p irrigation and debridement 3/9  - pain control per primary   - wound care with wet to dry   - ID following - on CTX/flagyl, will need prolonged course of abx --> will need midline/PICC line  - may need to go to [facility] temporarily for wound care/IV abx as pts [place]

## 2025-03-13 NOTE — PROGRESS NOTE ADULT - PROBLEM SELECTOR PLAN 1
Inpatient Plan:  - Check BG TID AC and HS while on PO diet   - C/w Lantus 22u QHS  - Adjust Admelog to 4u TID AC (HOLD if NPO or <50% of meal)  - C/w low dose Admelog correctional scales TID AC and HS  - Please keep all IV abx in NS solution if possible     Discharge Planning:   - Patient to continue with basal/bolus insulin regimen. Dosage TBD closer to d/c based on inpatient glycemic requirement. Low c-peptide, needs insulin.   - Patient should check BG TID AC and HS at home. Can use his FSL2 CGM to monitor BGs but if BG <100mg/dL or >250mg/dL, patient should confirm with fingerstick BG. Please tell patient to contact Endocrinologist if BG <70mg/dL x1 or >200mg/dL consistently or >400mg/dL x1. Declines desire for FSL3 for now.   - Patient to follow up with endocrinologist Dr. Panda at [place].

## 2025-03-13 NOTE — PROGRESS NOTE ADULT - ASSESSMENT
66 year old s/p Whipple's for pancreatic cancer .    Recent treated  for perirectal abscess admitted with selling, gas and pain in scrotum. Underwent debridement several items   cultures \  polymicrobial with E-COLI Prevotella Edwardsiella   current wd pack and doing well  non toxic    Resoling necrotizing scrotal infection Unclear if related to pancreatic cancer   will need several weeks of ab  we can change  to ceftriaxone and metronidazole for easy of administration   wd improved  packed   pt lives by himself and will need wound  and IV antibiotics

## 2025-03-14 LAB
ANION GAP SERPL CALC-SCNC: 11 MMOL/L — SIGNIFICANT CHANGE UP (ref 5–17)
BUN SERPL-MCNC: 16 MG/DL — SIGNIFICANT CHANGE UP (ref 7–23)
CALCIUM SERPL-MCNC: 8.1 MG/DL — LOW (ref 8.4–10.5)
CHLORIDE SERPL-SCNC: 103 MMOL/L — SIGNIFICANT CHANGE UP (ref 96–108)
CO2 SERPL-SCNC: 22 MMOL/L — SIGNIFICANT CHANGE UP (ref 22–31)
CREAT SERPL-MCNC: 0.87 MG/DL — SIGNIFICANT CHANGE UP (ref 0.5–1.3)
EGFR: 100 ML/MIN/1.73M2 — SIGNIFICANT CHANGE UP
GLUCOSE BLDC GLUCOMTR-MCNC: 186 MG/DL — HIGH (ref 70–99)
GLUCOSE BLDC GLUCOMTR-MCNC: 222 MG/DL — HIGH (ref 70–99)
GLUCOSE BLDC GLUCOMTR-MCNC: 232 MG/DL — HIGH (ref 70–99)
GLUCOSE BLDC GLUCOMTR-MCNC: 240 MG/DL — HIGH (ref 70–99)
GLUCOSE SERPL-MCNC: 184 MG/DL — HIGH (ref 70–99)
HCT VFR BLD CALC: 36.3 % — LOW (ref 39–50)
HGB BLD-MCNC: 12 G/DL — LOW (ref 13–17)
MAGNESIUM SERPL-MCNC: 2.4 MG/DL — SIGNIFICANT CHANGE UP (ref 1.6–2.6)
MCHC RBC-ENTMCNC: 26.8 PG — LOW (ref 27–34)
MCHC RBC-ENTMCNC: 33.1 G/DL — SIGNIFICANT CHANGE UP (ref 32–36)
MCV RBC AUTO: 81 FL — SIGNIFICANT CHANGE UP (ref 80–100)
NRBC BLD AUTO-RTO: 0 /100 WBCS — SIGNIFICANT CHANGE UP (ref 0–0)
PHOSPHATE SERPL-MCNC: 2.7 MG/DL — SIGNIFICANT CHANGE UP (ref 2.5–4.5)
PLATELET # BLD AUTO: 484 K/UL — HIGH (ref 150–400)
POTASSIUM SERPL-MCNC: 4.4 MMOL/L — SIGNIFICANT CHANGE UP (ref 3.5–5.3)
POTASSIUM SERPL-SCNC: 4.4 MMOL/L — SIGNIFICANT CHANGE UP (ref 3.5–5.3)
RBC # BLD: 4.48 M/UL — SIGNIFICANT CHANGE UP (ref 4.2–5.8)
RBC # FLD: 14.6 % — HIGH (ref 10.3–14.5)
SODIUM SERPL-SCNC: 136 MMOL/L — SIGNIFICANT CHANGE UP (ref 135–145)
WBC # BLD: 9.87 K/UL — SIGNIFICANT CHANGE UP (ref 3.8–10.5)
WBC # FLD AUTO: 9.87 K/UL — SIGNIFICANT CHANGE UP (ref 3.8–10.5)

## 2025-03-14 PROCEDURE — 99221 1ST HOSP IP/OBS SF/LOW 40: CPT

## 2025-03-14 PROCEDURE — G0545: CPT

## 2025-03-14 PROCEDURE — 99232 SBSQ HOSP IP/OBS MODERATE 35: CPT

## 2025-03-14 RX ORDER — INSULIN LISPRO 100 U/ML
7 INJECTION, SOLUTION INTRAVENOUS; SUBCUTANEOUS
Refills: 0 | Status: DISCONTINUED | OUTPATIENT
Start: 2025-03-14 | End: 2025-03-15

## 2025-03-14 RX ORDER — SODIUM HYPOCHLORITE 0.12 MG/ML
1 SOLUTION TOPICAL THREE TIMES A DAY
Refills: 0 | Status: DISCONTINUED | OUTPATIENT
Start: 2025-03-14 | End: 2025-03-19

## 2025-03-14 RX ADMIN — Medication 1 CAPSULE(S): at 18:09

## 2025-03-14 RX ADMIN — Medication 1 APPLICATION(S): at 09:28

## 2025-03-14 RX ADMIN — INSULIN LISPRO 2: 100 INJECTION, SOLUTION INTRAVENOUS; SUBCUTANEOUS at 18:09

## 2025-03-14 RX ADMIN — Medication 500 MILLIGRAM(S): at 05:12

## 2025-03-14 RX ADMIN — Medication 500 MILLIGRAM(S): at 18:09

## 2025-03-14 RX ADMIN — Medication 1 CAPSULE(S): at 13:36

## 2025-03-14 RX ADMIN — INSULIN LISPRO 1: 100 INJECTION, SOLUTION INTRAVENOUS; SUBCUTANEOUS at 09:27

## 2025-03-14 RX ADMIN — INSULIN LISPRO 4 UNIT(S): 100 INJECTION, SOLUTION INTRAVENOUS; SUBCUTANEOUS at 13:37

## 2025-03-14 RX ADMIN — INSULIN LISPRO 4 UNIT(S): 100 INJECTION, SOLUTION INTRAVENOUS; SUBCUTANEOUS at 09:28

## 2025-03-14 RX ADMIN — SODIUM HYPOCHLORITE 1 APPLICATION(S): 0.12 SOLUTION TOPICAL at 14:40

## 2025-03-14 RX ADMIN — ENOXAPARIN SODIUM 40 MILLIGRAM(S): 100 INJECTION SUBCUTANEOUS at 14:41

## 2025-03-14 RX ADMIN — INSULIN GLARGINE-YFGN 22 UNIT(S): 100 INJECTION, SOLUTION SUBCUTANEOUS at 22:05

## 2025-03-14 RX ADMIN — INSULIN LISPRO 2: 100 INJECTION, SOLUTION INTRAVENOUS; SUBCUTANEOUS at 13:36

## 2025-03-14 RX ADMIN — INSULIN LISPRO 7 UNIT(S): 100 INJECTION, SOLUTION INTRAVENOUS; SUBCUTANEOUS at 18:10

## 2025-03-14 RX ADMIN — Medication 1 CAPSULE(S): at 09:29

## 2025-03-14 RX ADMIN — SODIUM HYPOCHLORITE 1 APPLICATION(S): 0.12 SOLUTION TOPICAL at 22:10

## 2025-03-14 RX ADMIN — CEFTRIAXONE 100 MILLIGRAM(S): 500 INJECTION, POWDER, FOR SOLUTION INTRAMUSCULAR; INTRAVENOUS at 12:45

## 2025-03-14 NOTE — CONSULT NOTE ADULT - ASSESSMENT
A/P:59M PMH DM, pancreatic cancer, s/p whipple in [facility], presents with perirectal pain. Patient states he hadirectal pain, and an abscess was drained at an outpt office and he was sent home on PO antibiotics. The patient noticed worsening scrotal swelling, pain so he presented to the ED. 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     Wound Consult requested to assist w/ management of  Scrotal and rt groin wounds    recommendations:  Scrotum Triad BID  Rt groin NPWT  Abx per Medicine/ ID  Moisturize intact skin w/ SWEEN cream BID  Nutrition Consult for optimization in pt w/ increased nutritional needs            encourage high quality protein, smión/ prosource, MVI & Vit C to promote wound healing  Continue turning and positioning w/ offloading assistive devices as per protocol       Continue w/ attends under pads and Pericare as per protocol  Waffle Cushion to chair when oob to chair  Continue w/ low air loss pressure redistribution bed surface   Pt will need Group 2 mattress on hospital bed and ROHO cushion for wheel chair upon discharge home  Care as per medicine, will follow w/ you  Upon discharge f/u as outpatient at [facility] [address] [place] [address] [phone]  Seen and D/w team & RN  Thank you for this consult,  HARRY Barajas-BC, [facility]  [phone]  Nights/ Weekends/ Holidays please call:  General Surgery Consult pager ([phone]) for emergencies  Wound PT for multilayer leg wrapping or VAC issues ([phone])

## 2025-03-14 NOTE — PROGRESS NOTE ADULT - ASSESSMENT
59M PMH DM, pancreatic cancer, s/p whipple in [facility] presents with scrotal and perineal swelling c/f Fornier's gangrene. The patient is now s/p I&D of Perineum and Groin, and Scrotal Exploration with ACS/Trauma and Urology now s/p Irrigation and debridement of perineum and groin.     Recommendations  - Wet to dry dressing changes  - Wound care recs appreciated - pt will f/u in wound care center upon d/c   - ID recs appreciated - continue with Flagyl and IV rocephin   - Endo following, appreciate recs   - DVT ppx: Lvx  - Diet: CC  - pain control PRN   - Dispo planning ongoing - home vs EM    ACS/Trauma Surgery  [id number]

## 2025-03-14 NOTE — PROGRESS NOTE ADULT - SUBJECTIVE AND OBJECTIVE BOX
infectious diseases progress note:    Patient is a 59y old  Male who presents with a chief complaint of fourniere's gangrene (13 Mar 2025 12:16)        Pema's gangrene          ROS:  CONSTITUTIONAL:  Negative fever or chills, feels well, good appetite  EYES:  Negative  blurry vision or double vision  CARDIOVASCULAR:  Negative for chest pain or palpitations  RESPIRATORY:  Negative for cough, wheezing, or SOB   GASTROINTESTINAL:  Negative for nausea, vomiting, diarrhea, constipation, or abdominal pain  GENITOURINARY:  Negative frequency, urgency or dysuria  NEUROLOGIC:  No headache, confusion, dizziness, lightheadedness    Allergies    No Known Allergies    Intolerances        ANTIBIOTICS/RELEVANT:  antimicrobials  cefTRIAXone   IVPB      cefTRIAXone   IVPB 1000 milliGRAM(s) IV Intermittent every 24 hours  metroNIDAZOLE    Tablet 500 milliGRAM(s) Oral every 12 hours    immunologic:    OTHER:  acetaminophen   IVPB .. 1000 milliGRAM(s) IV Intermittent every 6 hours PRN  chlorhexidine 2% Cloths 1 Application(s) Topical <User Schedule>  dextrose Oral Gel 15 Gram(s) Oral once PRN  enoxaparin Injectable 40 milliGRAM(s) SubCutaneous every 24 hours  insulin glargine Injectable (LANTUS) 22 Unit(s) SubCutaneous at bedtime  insulin lispro (ADMELOG) corrective regimen sliding scale   SubCutaneous three times a day before meals  insulin lispro (ADMELOG) corrective regimen sliding scale   SubCutaneous at bedtime  insulin lispro Injectable (ADMELOG) 4 Unit(s) SubCutaneous three times a day with meals  oxyCODONE    IR 2.5 milliGRAM(s) Oral every 4 hours PRN  pancrelipase  (CREON 12,000 Lipase Units) 1 Capsule(s) Oral three times a day with meals  polyethylene glycol 3350 17 Gram(s) Oral daily  senna 2 Tablet(s) Oral at bedtime      Objective:  Vital Signs Last 24 Hrs  T(C): 37.1 (14 Mar 2025 05:00), Max: 37.1 (14 Mar 2025 05:00)  T(F): 98.8 (14 Mar 2025 05:00), Max: 98.8 (14 Mar 2025 05:00)  HR: 68 (14 Mar 2025 05:00) (65 - 86)  BP: 117/73 (14 Mar 2025 05:00) (99/66 - 119/79)  BP(mean): --  RR: 18 (14 Mar 2025 05:00) (18 - 18)  SpO2: 97% (14 Mar 2025 05:00) (97% - 98%)    Parameters below as of 14 Mar 2025 05:00  Patient On (Oxygen Delivery Method): room air                 LABS:                        12.0   9.87  )-----------( 484      ( 14 Mar 2025 06:33 )             36.3     03-14    136  |  103  |  16  ----------------------------<  184[H]  4.4   |  22  |  0.87    Ca    8.1[L]      14 Mar 2025 06:33  Phos  2.7     03-14  Mg     2.4     03-14        Urinalysis Basic - ( 14 Mar 2025 06:33 )    Color: x / Appearance: x / SG: x / pH: x  Gluc: 184 mg/dL / Ketone: x  / Bili: x / Urobili: x   Blood: x / Protein: x / Nitrite: x   Leuk Esterase: x / RBC: x / WBC x   Sq Epi: x / Non Sq Epi: x / Bacteria: x          MICROBIOLOGY:    RECENT CULTURES:  03-08 @ 06:05 Swab Perineal Wound Culture (Swab)                Culture yields growth of greater than 3 colony types of  bacteria,  which may indicate contamination and normal deangelo  Call client services within 7 days if further workup is clinically  indicated.    03-08 @ 05:59 Tissue Perineal Wound Culture (Tissue)   DONIS    Few polymorphonuclear leukocytes per low power field  Moderate Gram Negative Rods per oil power field  Moderate Gram Positive Rods per oil power field  Moderate Gram positive cocci in pairs per oil power field    Escherichia coli  Escherichia coli  Edwardsiella tarda  Escherichia coli     Moderate Escherichia coli Multiple Morphological Strains  Moderate Edwardsiella tarda  Moderate Streptococcus anginosus "Susceptibilities not performed"  Few Bacteroides ovatus group "Susceptibilities not performed"  Moderate Prevotella baroniae "Susceptibilities not performed"    03-07 @ 23:48 Clean Catch Clean Catch (Midstream)                No growth          RESPIRATORY CULTURES:              RADIOLOGY & ADDITIONAL STUDIES:        Pager [phone]  After 5 pm/weekends or if no response :[phone]

## 2025-03-14 NOTE — CONSULT NOTE ADULT - CONSULT REASON
c/f fornier's gangrene
Pema's gangrene s/p Irrigation and debridement of perineum and groin
facia
hanna's gangrene
Wound Consult
T2DM
co-management

## 2025-03-14 NOTE — PROGRESS NOTE ADULT - SUBJECTIVE AND OBJECTIVE BOX
Seen earlier today     Chief Complaint: Diabetes Mellitus follow up    INTERVAL HX: " Feel better" Tolerating POs, eats well. Last 24 hour BGs 176-200s with fasting . Denies eating snacks between meals, admits that he eats  2 pieces of crackers at HS at times       Review of Systems:  General: As above  GI: No nausea, vomiting  Endocrine: no  S&Sx of hypoglycemia    Allergies    No Known Allergies    Intolerances      MEDICATIONS  (STANDING):  cefTRIAXone   IVPB      cefTRIAXone   IVPB 1000 milliGRAM(s) IV Intermittent every 24 hours  chlorhexidine 2% Cloths 1 Application(s) Topical <User Schedule>  Dakins Solution - 1/4 Strength 1 Application(s) Topical three times a day  enoxaparin Injectable 40 milliGRAM(s) SubCutaneous every 24 hours  insulin glargine Injectable (LANTUS) 22 Unit(s) SubCutaneous at bedtime  insulin lispro (ADMELOG) corrective regimen sliding scale   SubCutaneous at bedtime  insulin lispro (ADMELOG) corrective regimen sliding scale   SubCutaneous three times a day before meals  insulin lispro Injectable (ADMELOG) 7 Unit(s) SubCutaneous three times a day with meals  metroNIDAZOLE    Tablet 500 milliGRAM(s) Oral every 12 hours  pancrelipase  (CREON 12,000 Lipase Units) 1 Capsule(s) Oral three times a day with meals  senna 2 Tablet(s) Oral at bedtime        insulin glargine Injectable (LANTUS)   22 Unit(s) SubCutaneous (03-13-25 @ 21:16)    insulin lispro (ADMELOG) corrective regimen sliding scale   2 Unit(s) SubCutaneous (03-14-25 @ 13:36)   1 Unit(s) SubCutaneous (03-14-25 @ 09:27)   1 Unit(s) SubCutaneous (03-13-25 @ 17:44)    insulin lispro Injectable (ADMELOG)   4 Unit(s) SubCutaneous (03-14-25 @ 13:37)   4 Unit(s) SubCutaneous (03-14-25 @ 09:28)   4 Unit(s) SubCutaneous (03-13-25 @ 17:45)        PHYSICAL EXAM:  VITALS: T(C): 36.4 (03-14-25 @ 16:42)  T(F): 97.6 (03-14-25 @ 16:42), Max: 98.8 (03-14-25 @ 05:00)  HR: 80 (03-14-25 @ 16:42) (68 - 90)  BP: 100/64 (03-14-25 @ 16:42) (100/64 - 119/79)  RR:  (18 - 18)  SpO2:  (95% - 99%)  Wt(kg): --  GENERAL:  Male laying in bed, in NAD  Respiratory: Respirations unlabored   Extremities: Warm, no edema  NEURO: Alert , appropriate     LABS:  POCT Blood Glucose.: 222 mg/dL (03-14-25 @ 13:34)  POCT Blood Glucose.: 186 mg/dL (03-14-25 @ 09:24)  POCT Blood Glucose.: 206 mg/dL (03-13-25 @ 21:15)  POCT Blood Glucose.: 176 mg/dL (03-13-25 @ 17:29)  POCT Blood Glucose.: 238 mg/dL (03-13-25 @ 12:10)  POCT Blood Glucose.: 163 mg/dL (03-13-25 @ 07:23)  POCT Blood Glucose.: 234 mg/dL (03-12-25 @ 22:11)  POCT Blood Glucose.: 125 mg/dL (03-12-25 @ 17:12)  POCT Blood Glucose.: 94 mg/dL (03-12-25 @ 12:18)  POCT Blood Glucose.: 75 mg/dL (03-12-25 @ 09:33)  POCT Blood Glucose.: 179 mg/dL (03-11-25 @ 21:01)  POCT Blood Glucose.: 193 mg/dL (03-11-25 @ 17:29)                          12.0   9.87  )-----------( 484      ( 14 Mar 2025 06:33 )             36.3     03-14    136  |  103  |  16  ----------------------------<  184[H]  4.4   |  22  |  0.87    Ca    8.1[L]      14 Mar 2025 06:33  Phos  2.7     03-14  Mg     2.4     03-14      eGFR: 100 mL/min/1.73m2 (14 Mar 2025 06:33)      Thyroid Function Tests:  03-03 @ 00:37 TSH 3.90 FreeT4 -- T3 -- Anti TPO -- Anti Thyroglobulin Ab -- TSI --      A1C with Estimated Average Glucose Result: 9.3 % (03-08-25 @ 05:35)    Estimated Average Glucose: 220 mg/dL (03-08-25 @ 05:35)        Diet, Consistent Carbohydrate w/Evening Snack (03-09-25 @ 14:14) [Active]

## 2025-03-14 NOTE — PROGRESS NOTE ADULT - PROBLEM SELECTOR PLAN 1
Inpatient Plan:  - Check BG TID AC and HS while on PO diet   - C/w Lantus 22u QHS  - Adjust Admelog to 7u TID AC (HOLD if NPO or <50% of meal)  - C/w low dose Admelog correctional scales TID AC and HS  - Please keep all IV abx in NS solution if possible     Discharge Planning:   - Patient to continue with basal/bolus insulin regimen. Dosage TBD closer to d/c based on inpatient glycemic requirement. Low c-peptide, needs insulin.   - Patient should check BG TID AC and HS at home. Can use his FSL2 CGM to monitor BGs but if BG <100mg/dL or >250mg/dL, patient should confirm with fingerstick BG. Please tell patient to contact Endocrinologist if BG <70mg/dL x1 or >200mg/dL consistently or >400mg/dL x1. Declines desire for FSL3 for now.   - Patient to follow up with endocrinologist Dr. Panda at [place].

## 2025-03-14 NOTE — CONSULT NOTE ADULT - CONSULT REQUESTED DATE/TIME
08-Mar-2025
07-Mar-2025 23:57
10-Mar-2025 15:56
11-Mar-2025
08-Mar-2025
14-Mar-2025 11:08
10-Mar-2025 14:46

## 2025-03-14 NOTE — PROGRESS NOTE ADULT - ASSESSMENT
59y Male with hx of T2DM, HTN, HLD, pancreatic adenocarcinoma s/p juan (2017), presents with scrotal and perineal swelling. 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 Endocrine was consulted for T2DM. Patient is feeling okay, eating mostly full meals now and tolerating POs. Patient is s/p I&D and exploration done 10/9. Patient remains on IV abx.   Tolerating POs, eats well. Last 24 hour BGs 176-200s with fasting . Eats some crackers at HS at times.  BGs mostly above goal. Will adjust insulin doses for BG Goal 100-180mg/dl     # T2DM with hyperglycemia   - Most recent Hemoglobin A1C 9.3%  - At home, on lantus 26 units and lispro 10-12 units premeals  - Current FS ranges from low-mid 200 with improved appetite; uses FSL2 with reader  C-Peptide, Serum: 2.2 ng/mL (11.16.18 @ 12:10)  C-Peptide, Serum: 0.2 ng/mL (03.11.25 @ 13:04)

## 2025-03-14 NOTE — PROGRESS NOTE ADULT - ASSESSMENT
66 year old s/p Whipple's for pancreatic cancer .    Recent treated  for perirectal abscess admitted with selling, gas and pain in scrotum. Underwent debridement several items   cultures \  polymicrobial with E-COLI Prevotella Edwardsiella   current wd pack and doing well  non toxic    Resoling necrotizing scrotal infection Unclear if related to pancreatic cancer   will need several weeks of ab    changed  to ceftriaxone and metronidazole for easy of administration   wd improved  packed   pt lives by himself and will need wound  and IV antibiotics  will likely go to EM  discussed with team    Would plan  to continue antibiotics until 3/28

## 2025-03-14 NOTE — CONSULT NOTE ADULT - SUBJECTIVE AND OBJECTIVE BOX
Wound SURGERY CONSULT NOTE    HPI:  59M PMH DM, pancreatic cancer, s/p whipple in [facility], presents with perirectal pain. Patient states he hadirectal pain, and an abscess was drained at an outpt office and he was sent home on PO antibiotics. The patient noticed worsening scrotal swelling, pain so he presented to the ED. 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       Wound consult requested by team to assist w/ management of   scrotal/groin wound.   Pt w/ c/o pain, drainage, swelling. Offloading and pericare initiated upon admission as pt Increasingly sedentary 2/2 to illness. Pt is not Incontinent of urine & stool.   Appetite good. [place] is [place] of [place] chilren are in college. All questions asked and answered to pt's expressed understanding and satisfaction.    Current Diet: Diet, Consistent Carbohydrate w/Evening Snack (03-09-25 @ 14:14)      PAST MEDICAL & SURGICAL HISTORY:  Pancreatic cancer      DM (diabetes mellitus)      No significant past surgical history          REVIEW OF SYSTEMS:   General/ Breast/ Skin/Vasc/ Neuro/ MSK: see HPI  All other systems negative    MEDICATIONS  (STANDING):  cefTRIAXone   IVPB      cefTRIAXone   IVPB 1000 milliGRAM(s) IV Intermittent every 24 hours  chlorhexidine 2% Cloths 1 Application(s) Topical <User Schedule>  Dakins Solution - 1/4 Strength 1 Application(s) Topical three times a day  enoxaparin Injectable 40 milliGRAM(s) SubCutaneous every 24 hours  insulin glargine Injectable (LANTUS) 22 Unit(s) SubCutaneous at bedtime  insulin lispro (ADMELOG) corrective regimen sliding scale   SubCutaneous three times a day before meals  insulin lispro (ADMELOG) corrective regimen sliding scale   SubCutaneous at bedtime  insulin lispro Injectable (ADMELOG) 4 Unit(s) SubCutaneous three times a day with meals  metroNIDAZOLE    Tablet 500 milliGRAM(s) Oral every 12 hours  pancrelipase  (CREON 12,000 Lipase Units) 1 Capsule(s) Oral three times a day with meals  polyethylene glycol 3350 17 Gram(s) Oral daily  senna 2 Tablet(s) Oral at bedtime    MEDICATIONS  (PRN):  acetaminophen   IVPB .. 1000 milliGRAM(s) IV Intermittent every 6 hours PRN Mild Pain (1 - 3)  dextrose Oral Gel 15 Gram(s) Oral once PRN Blood Glucose LESS THAN 70 milliGRAM(s)/deciliter  oxyCODONE    IR 2.5 milliGRAM(s) Oral every 4 hours PRN Moderate Pain (4 - 6)      Allergies    No Known Allergies    Intolerances        SOCIAL HISTORY:  ;    No hx of smoking, ETOH, drugs    FAMILY HISTORY:     No pertinent family hx among first degree relatives.    PHYSICAL EXAM:  Vital Signs Last 24 Hrs  T(C): 36.4 (14 Mar 2025 09:41), Max: 37.1 (14 Mar 2025 05:00)      T(F): 97.6 (14 Mar 2025 09:41), Max: 98.8 (14 Mar 2025 05:00)  HR: 71 (14 Mar 2025 09:41) (65 - 86)  BP: 110/70 (14 Mar 2025 09:41) (99/66 - 119/79)  BP(mean): --  RR: 18 (14 Mar 2025 09:41) (18 - 18)  SpO2: 97% (14 Mar 2025 09:41) (97% - 98%)        NAD, A&Ox3/WD/sclera clear/ mucosa moist, throat clear, trachea midline, neck supple  Respiratory: nonlabored w/ equal chest rise  Gastrointestinal: soft NT/ND (+)B/S  : (+) c/d/i without active purulent drainage, R perineum and scrotum edematous, warm to touch, and tender       Scrotum: 3.0cm x 2.0cm x 0.2cm  red dry granulating tissue       Groin Right: 10.0cm x 5.0cm x 3.5 cm red non-granulating moist tissue some serosanguinous drainage             No odor, erythema, increased warmth, tenderness, induration, fluctuance, nor crepitus  Neurology:  weakened strength & sensation grossly intact  verbal,  follows commands  Psych: calm/ appropriate  Musculoskeletal:  FROM, no deformities/ contractures  Vascular: BLE equally warm,  no cyanosis, clubbing, edema nor acute ischemia                 Skin:  moist w/ good turgor    LABS/ CULTURES/ RADIOLOGY:                        12.0   9.87  )-----------( 484      ( 14 Mar 2025 06:33 )             36.3       136  |  103  |  16  ----------------------------<  184      [03-14-25 @ 06:33]  4.4   |  22  |  0.87        Ca     8.1     [03-14-25 @ 06:33]      Mg     2.4     [03-14-25 @ 06:33]      Phos  2.7     [03-14-25 @ 06:33]              A1C with Estimated Average Glucose Result: 9.3 % (03-08-25 @ 05:35)      IMAGING:  < from: CT Abdomen and Pelvis w/ IV Cont (03.07.25 @ 21:14) >  PROCEDURE:  CT of the Abdomen and Pelvis was performed.  Sagittal and coronal reformats were performed.    FINDINGS:  LOWER CHEST: Minimal linear atelectasis. Borderline heart size. Calcified   atherosclerosis of the coronary arteries.    LIVER: Grossly stable indeterminate hyperenhancing lesion in the   posterior subcapsular margin of segment 7 measures up to 1.1 cm and may   be further assessed and characterized with outpatient contrast-enhanced   MRI liver mass protocol.  BILE DUCTS: Mild central pneumobilia, with a left predominance.  GALLBLADDER: Cholecystectomy.  SPLEEN: Within normal limits.  PANCREAS: Atrophic and infiltrated by fat. There are small gas   collections scattered throughout the pancreatic duct, which is not   significantly dilated.  ADRENALS: Within normal limits.  KIDNEYS/URETERS: Within normal limits.    BLADDER: Moderate diffuse wall thickening and mild mucosal   hyperenhancement are findings that need further assessment and   characterization with urinalysis and urine cultures, to exclude an   underlying cystitis.  REPRODUCTIVE ORGANS: Gas is tracing down the space between the prostate   gland and the medial surface of the left obturator internus. The prostate   gland is mildly enlarged. There is also subcutaneous gas along the right   side of the penile shaft, and extending to the adjacent perineal region,   perianal region, and scrotal sac. Complex fluid is seen surrounding both   testicles. Inflammatory changes are seen throughout all these described   areas in the pelvis.    BOWEL: No bowel obstruction. Appendix is not visualized. No evidence of   inflammation in the pericecal region. Partial bowel resection surgical   changes, without CT evident complications.  PERITONEUM/RETROPERITONEUM: Within normal limits.  VESSELS: Atherosclerotic changes.  LYMPH NODES: No lymphadenopathy.  ABDOMINAL WALL: Postsurgical changes.  BONES: Degenerative changes.    IMPRESSION:  Inflammation and subcutaneous emphysema involving the scrotal sac,   perineal region, perianal region, right penile shaft, and space between   the prostate gland and the left obturator internus. Complex fluid is seen   within the scrotum as well. These findings are suspicious for an ongoing   gas producing infectious process, although no definite organized abscess   is visualized. Correlate with physical examination.    < end of copied text >   (08 Mar 2025 01:54)

## 2025-03-14 NOTE — PROGRESS NOTE ADULT - SUBJECTIVE AND OBJECTIVE BOX
SURGERY DAILY PROGRESS NOTE    SUBJECTIVE:  Patient seen and examined at bedside during morning rounds.     Vital Signs Last 24 Hrs  T(C): 36.4 (14 Mar 2025 09:41), Max: 37.1 (14 Mar 2025 05:00)  T(F): 97.6 (14 Mar 2025 09:41), Max: 98.8 (14 Mar 2025 05:00)  HR: 71 (14 Mar 2025 09:41) (68 - 86)  BP: 110/70 (14 Mar 2025 09:41) (102/64 - 119/79)  BP(mean): --  RR: 18 (14 Mar 2025 09:41) (18 - 18)  SpO2: 97% (14 Mar 2025 09:41) (97% - 98%)    Parameters below as of 14 Mar 2025 09:41  Patient On (Oxygen Delivery Method): room air    I&Os    03-13-25 @ 07:01  -  03-14-25 @ 07:00  --------------------------------------------------------  IN: 720 mL / OUT: 1750 mL / NET: -1030 mL    03-14-25 @ 07:01  -  03-14-25 @ 12:38  --------------------------------------------------------  IN: 240 mL / OUT: 0 mL / NET: 240 mL    PHYSICAL EXAM:  General: NAD, resting comfortably in bed  HEENT: Normocephalic atraumatic  Respiratory: Nonlabored respirations  : dressing c/d/i  Neuro: awake, alert and answering questions appropriately     MEDICATIONS:  acetaminophen   IVPB .. 1000 milliGRAM(s) IV Intermittent every 6 hours PRN  cefTRIAXone   IVPB      cefTRIAXone   IVPB 1000 milliGRAM(s) IV Intermittent every 24 hours  chlorhexidine 2% Cloths 1 Application(s) Topical <User Schedule>  Dakins Solution - 1/4 Strength 1 Application(s) Topical three times a day  dextrose Oral Gel 15 Gram(s) Oral once PRN  enoxaparin Injectable 40 milliGRAM(s) SubCutaneous every 24 hours  insulin glargine Injectable (LANTUS) 22 Unit(s) SubCutaneous at bedtime  insulin lispro (ADMELOG) corrective regimen sliding scale   SubCutaneous three times a day before meals  insulin lispro (ADMELOG) corrective regimen sliding scale   SubCutaneous at bedtime  insulin lispro Injectable (ADMELOG) 4 Unit(s) SubCutaneous three times a day with meals  metroNIDAZOLE    Tablet 500 milliGRAM(s) Oral every 12 hours  oxyCODONE    IR 2.5 milliGRAM(s) Oral every 4 hours PRN  pancrelipase  (CREON 12,000 Lipase Units) 1 Capsule(s) Oral three times a day with meals  polyethylene glycol 3350 17 Gram(s) Oral daily  senna 2 Tablet(s) Oral at bedtime      LABS:                        12.0   9.87  )-----------( 484      ( 14 Mar 2025 06:33 )             36.3     03-14    136  |  103  |  16  ----------------------------<  184[H]  4.4   |  22  |  0.87    Ca    8.1[L]      14 Mar 2025 06:33  Phos  2.7     03-14  Mg     2.4     03-14    Urinalysis Basic - ( 14 Mar 2025 06:33 )  Color: x / Appearance: x / SG: x / pH: x  Gluc: 184 mg/dL / Ketone: x  / Bili: x / Urobili: x   Blood: x / Protein: x / Nitrite: x   Leuk Esterase: x / RBC: x / WBC x   Sq Epi: x / Non Sq Epi: x / Bacteria: x

## 2025-03-15 LAB
ANION GAP SERPL CALC-SCNC: 11 MMOL/L — SIGNIFICANT CHANGE UP (ref 5–17)
BUN SERPL-MCNC: 20 MG/DL — SIGNIFICANT CHANGE UP (ref 7–23)
CALCIUM SERPL-MCNC: 8.3 MG/DL — LOW (ref 8.4–10.5)
CHLORIDE SERPL-SCNC: 105 MMOL/L — SIGNIFICANT CHANGE UP (ref 96–108)
CREAT SERPL-MCNC: 0.99 MG/DL — SIGNIFICANT CHANGE UP (ref 0.5–1.3)
EGFR: 88 ML/MIN/1.73M2 — SIGNIFICANT CHANGE UP
EGFR: 88 ML/MIN/1.73M2 — SIGNIFICANT CHANGE UP
GLUCOSE BLDC GLUCOMTR-MCNC: 159 MG/DL — HIGH (ref 70–99)
GLUCOSE BLDC GLUCOMTR-MCNC: 169 MG/DL — HIGH (ref 70–99)
GLUCOSE BLDC GLUCOMTR-MCNC: 230 MG/DL — HIGH (ref 70–99)
GLUCOSE BLDC GLUCOMTR-MCNC: 261 MG/DL — HIGH (ref 70–99)
GLUCOSE SERPL-MCNC: 240 MG/DL — HIGH (ref 70–99)
HCT VFR BLD CALC: 38.9 % — LOW (ref 39–50)
HGB BLD-MCNC: 12.4 G/DL — LOW (ref 13–17)
MAGNESIUM SERPL-MCNC: 2.4 MG/DL — SIGNIFICANT CHANGE UP (ref 1.6–2.6)
MCHC RBC-ENTMCNC: 26.3 PG — LOW (ref 27–34)
MCHC RBC-ENTMCNC: 31.9 G/DL — LOW (ref 32–36)
MCV RBC AUTO: 82.6 FL — SIGNIFICANT CHANGE UP (ref 80–100)
NRBC BLD AUTO-RTO: 0 /100 WBCS — SIGNIFICANT CHANGE UP (ref 0–0)
PHOSPHATE SERPL-MCNC: 2.5 MG/DL — SIGNIFICANT CHANGE UP (ref 2.5–4.5)
PLATELET # BLD AUTO: 593 K/UL — HIGH (ref 150–400)
POTASSIUM SERPL-MCNC: 4.2 MMOL/L — SIGNIFICANT CHANGE UP (ref 3.5–5.3)
POTASSIUM SERPL-SCNC: 4.2 MMOL/L — SIGNIFICANT CHANGE UP (ref 3.5–5.3)
RBC # BLD: 4.71 M/UL — SIGNIFICANT CHANGE UP (ref 4.2–5.8)
RBC # FLD: 14.8 % — HIGH (ref 10.3–14.5)
SODIUM SERPL-SCNC: 138 MMOL/L — SIGNIFICANT CHANGE UP (ref 135–145)
WBC # FLD AUTO: 8.44 K/UL — SIGNIFICANT CHANGE UP (ref 3.8–10.5)

## 2025-03-15 PROCEDURE — 99232 SBSQ HOSP IP/OBS MODERATE 35: CPT

## 2025-03-15 RX ORDER — MELATONIN 5 MG
3 TABLET ORAL AT BEDTIME
Refills: 0 | Status: DISCONTINUED | OUTPATIENT
Start: 2025-03-15 | End: 2025-03-20

## 2025-03-15 RX ORDER — INSULIN LISPRO 100 U/ML
9 INJECTION, SOLUTION INTRAVENOUS; SUBCUTANEOUS
Refills: 0 | Status: DISCONTINUED | OUTPATIENT
Start: 2025-03-15 | End: 2025-03-18

## 2025-03-15 RX ORDER — INSULIN GLARGINE-YFGN 100 [IU]/ML
26 INJECTION, SOLUTION SUBCUTANEOUS AT BEDTIME
Refills: 0 | Status: DISCONTINUED | OUTPATIENT
Start: 2025-03-15 | End: 2025-03-20

## 2025-03-15 RX ORDER — POTASSIUM PHOSPHATE, MONOBASIC POTASSIUM PHOSPHATE, DIBASIC INJECTION, 236; 224 MG/ML; MG/ML
30 SOLUTION, CONCENTRATE INTRAVENOUS ONCE
Refills: 0 | Status: COMPLETED | OUTPATIENT
Start: 2025-03-15 | End: 2025-03-15

## 2025-03-15 RX ADMIN — SODIUM HYPOCHLORITE 1 APPLICATION(S): 0.12 SOLUTION TOPICAL at 13:07

## 2025-03-15 RX ADMIN — INSULIN LISPRO 3: 100 INJECTION, SOLUTION INTRAVENOUS; SUBCUTANEOUS at 08:58

## 2025-03-15 RX ADMIN — Medication 1 CAPSULE(S): at 13:35

## 2025-03-15 RX ADMIN — Medication 500 MILLIGRAM(S): at 05:30

## 2025-03-15 RX ADMIN — SODIUM HYPOCHLORITE 1 APPLICATION(S): 0.12 SOLUTION TOPICAL at 22:07

## 2025-03-15 RX ADMIN — SODIUM HYPOCHLORITE 1 APPLICATION(S): 0.12 SOLUTION TOPICAL at 05:31

## 2025-03-15 RX ADMIN — ENOXAPARIN SODIUM 40 MILLIGRAM(S): 100 INJECTION SUBCUTANEOUS at 13:07

## 2025-03-15 RX ADMIN — INSULIN LISPRO 2: 100 INJECTION, SOLUTION INTRAVENOUS; SUBCUTANEOUS at 18:36

## 2025-03-15 RX ADMIN — Medication 1 CAPSULE(S): at 09:00

## 2025-03-15 RX ADMIN — CEFTRIAXONE 100 MILLIGRAM(S): 500 INJECTION, POWDER, FOR SOLUTION INTRAMUSCULAR; INTRAVENOUS at 13:05

## 2025-03-15 RX ADMIN — INSULIN LISPRO 1: 100 INJECTION, SOLUTION INTRAVENOUS; SUBCUTANEOUS at 13:35

## 2025-03-15 RX ADMIN — Medication 500 MILLIGRAM(S): at 18:36

## 2025-03-15 RX ADMIN — Medication 1 CAPSULE(S): at 18:35

## 2025-03-15 RX ADMIN — POTASSIUM PHOSPHATE, MONOBASIC POTASSIUM PHOSPHATE, DIBASIC INJECTION, 83.33 MILLIMOLE(S): 236; 224 SOLUTION, CONCENTRATE INTRAVENOUS at 14:41

## 2025-03-15 RX ADMIN — INSULIN LISPRO 7 UNIT(S): 100 INJECTION, SOLUTION INTRAVENOUS; SUBCUTANEOUS at 08:58

## 2025-03-15 RX ADMIN — INSULIN LISPRO 9 UNIT(S): 100 INJECTION, SOLUTION INTRAVENOUS; SUBCUTANEOUS at 13:35

## 2025-03-15 RX ADMIN — INSULIN GLARGINE-YFGN 26 UNIT(S): 100 INJECTION, SOLUTION SUBCUTANEOUS at 22:07

## 2025-03-15 RX ADMIN — Medication 1 APPLICATION(S): at 09:02

## 2025-03-15 RX ADMIN — INSULIN LISPRO 9 UNIT(S): 100 INJECTION, SOLUTION INTRAVENOUS; SUBCUTANEOUS at 18:36

## 2025-03-15 NOTE — PROGRESS NOTE ADULT - SUBJECTIVE AND OBJECTIVE BOX
SURGERY DAILY PROGRESS NOTE    SUBJECTIVE: Patient seen and evaluated on AM rounds.     -----------------------------------------------------------------------------------------------------------------------------------------------------------------------------------------------------------  OBJECTIVE:  Vital Signs Last 24 Hrs  T(C): 36.7 (15 Mar 2025 00:15), Max: 37.1 (14 Mar 2025 05:00)  T(F): 98 (15 Mar 2025 00:15), Max: 98.8 (14 Mar 2025 05:00)  HR: 81 (15 Mar 2025 00:15) (60 - 90)  BP: 100/66 (15 Mar 2025 00:15) (99/63 - 117/73)  BP(mean): --  RR: 18 (15 Mar 2025 00:15) (18 - 18)  SpO2: 97% (15 Mar 2025 00:15) (95% - 99%)    Parameters below as of 15 Mar 2025 00:15  Patient On (Oxygen Delivery Method): room air      I&O's Detail    13 Mar 2025 07:01  -  14 Mar 2025 07:00  --------------------------------------------------------  IN:    Oral Fluid: 720 mL  Total IN: 720 mL    OUT:    Blood Loss (mL): 0 mL    Voided (mL): 1750 mL  Total OUT: 1750 mL    Total NET: -1030 mL      14 Mar 2025 07:01  -  15 Mar 2025 03:59  --------------------------------------------------------  IN:    IV PiggyBack: 50 mL    Oral Fluid: 480 mL  Total IN: 530 mL    OUT:    Voided (mL): 1500 mL  Total OUT: 1500 mL    Total NET: -970 mL        Daily     Daily   MEDICATIONS  (STANDING):  cefTRIAXone   IVPB      cefTRIAXone   IVPB 1000 milliGRAM(s) IV Intermittent every 24 hours  chlorhexidine 2% Cloths 1 Application(s) Topical <User Schedule>  Dakins Solution - 1/4 Strength 1 Application(s) Topical three times a day  enoxaparin Injectable 40 milliGRAM(s) SubCutaneous every 24 hours  insulin glargine Injectable (LANTUS) 22 Unit(s) SubCutaneous at bedtime  insulin lispro (ADMELOG) corrective regimen sliding scale   SubCutaneous at bedtime  insulin lispro (ADMELOG) corrective regimen sliding scale   SubCutaneous three times a day before meals  insulin lispro Injectable (ADMELOG) 7 Unit(s) SubCutaneous three times a day with meals  metroNIDAZOLE    Tablet 500 milliGRAM(s) Oral every 12 hours  pancrelipase  (CREON 12,000 Lipase Units) 1 Capsule(s) Oral three times a day with meals  senna 2 Tablet(s) Oral at bedtime    MEDICATIONS  (PRN):  acetaminophen   IVPB .. 1000 milliGRAM(s) IV Intermittent every 6 hours PRN Mild Pain (1 - 3)  dextrose Oral Gel 15 Gram(s) Oral once PRN Blood Glucose LESS THAN 70 milliGRAM(s)/deciliter  oxyCODONE    IR 2.5 milliGRAM(s) Oral every 4 hours PRN Moderate Pain (4 - 6)      LABS:                        12.0   9.87  )-----------( 484      ( 14 Mar 2025 06:33 )             36.3     03-14    136  |  103  |  16  ----------------------------<  184[H]  4.4   |  22  |  0.87    Ca    8.1[L]      14 Mar 2025 06:33  Phos  2.7     03-14  Mg     2.4     03-14        Urinalysis Basic - ( 14 Mar 2025 06:33 )    Color: x / Appearance: x / SG: x / pH: x  Gluc: 184 mg/dL / Ketone: x  / Bili: x / Urobili: x   Blood: x / Protein: x / Nitrite: x   Leuk Esterase: x / RBC: x / WBC x   Sq Epi: x / Non Sq Epi: x / Bacteria: x        PHYSICAL EXAM:  General: NAD, resting comfortably in bed  HEENT: Normocephalic atraumatic  Respiratory: Nonlabored respirations  : dressing c/d/i  Neuro: awake, alert and answering questions appropriately

## 2025-03-15 NOTE — PROGRESS NOTE ADULT - ASSESSMENT
59y Male with hx of T2DM, HTN, HLD, pancreatic adenocarcinoma s/p juan (2017), presents with scrotal and perineal swelling. 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 Endocrine was consulted for T2DM. Patient is feeling okay, eating mostly full meals now and tolerating POs. Patient is s/p I&D and exploration done 10/9. Patient remains on IV abx.   Tolerating POs, eats well. Last 24 hour BGs 200s. BGs mostly above goal. BG Goal 100-180mg/dl.

## 2025-03-15 NOTE — PROGRESS NOTE ADULT - PROBLEM SELECTOR PLAN 1
Inpatient Plan:  - Check BG TID AC and HS while on PO diet   - Increase Lantus 26u QHS  - Adjust Admelog to 9u TID AC (HOLD if NPO or <50% of meal)  - C/w low dose Admelog correctional scales TID AC and HS  - Please keep all IV abx in NS solution if possible     Discharge Planning:   - Patient to continue with basal/bolus insulin regimen. Dosage TBD closer to d/c based on inpatient glycemic requirement. Low c-peptide, needs insulin.   - Patient should check BG TID AC and HS at home. Can use his FSL2 CGM to monitor BGs but if BG <100mg/dL or >250mg/dL, patient should confirm with fingerstick BG. Please tell patient to contact Endocrinologist if BG <70mg/dL x1 or >200mg/dL consistently or >400mg/dL x1. Declines desire for FSL3 for now.   - Patient to follow up with endocrinologist Dr. Panda at [facility].

## 2025-03-15 NOTE — PROGRESS NOTE ADULT - SUBJECTIVE AND OBJECTIVE BOX
Diabetes Follow up note:    Chief complaint: T2DM    Interval Hx:    Review of Systems:  General:  GI: Tolerating POs. Denies N/V/D/Abd pain  CV: Denies CP/SOB  ENDO: No S&Sx of hypoglycemia    MEDS:  insulin glargine Injectable (LANTUS) 22 Unit(s) SubCutaneous at bedtime  insulin lispro (ADMELOG) corrective regimen sliding scale   SubCutaneous at bedtime  insulin lispro (ADMELOG) corrective regimen sliding scale   SubCutaneous three times a day before meals  insulin lispro Injectable (ADMELOG) 7 Unit(s) SubCutaneous three times a day with meals    cefTRIAXone   IVPB      cefTRIAXone   IVPB 1000 milliGRAM(s) IV Intermittent every 24 hours  metroNIDAZOLE    Tablet 500 milliGRAM(s) Oral every 12 hours    Allergies    No Known Allergies      PE:  General:  Vital Signs Last 24 Hrs  T(C): 36.6 (15 Mar 2025 09:42), Max: 36.7 (14 Mar 2025 13:34)  T(F): 97.9 (15 Mar 2025 09:42), Max: 98.1 (14 Mar 2025 13:34)  HR: 70 (15 Mar 2025 09:42) (60 - 90)  BP: 106/65 (15 Mar 2025 09:42) (99/63 - 111/68)  BP(mean): --  RR: 18 (15 Mar 2025 09:42) (18 - 18)  SpO2: 97% (15 Mar 2025 09:42) (95% - 99%)    Parameters below as of 15 Mar 2025 09:42  Patient On (Oxygen Delivery Method): room air      Abd: Soft, NT,ND,   Extremities: Warm  Neuro: A&O X3    LABS:  POCT Blood Glucose.: 261 mg/dL (03-15-25 @ 08:11)  POCT Blood Glucose.: 240 mg/dL (03-14-25 @ 22:00)  POCT Blood Glucose.: 232 mg/dL (03-14-25 @ 17:35)  POCT Blood Glucose.: 222 mg/dL (03-14-25 @ 13:34)  POCT Blood Glucose.: 186 mg/dL (03-14-25 @ 09:24)  POCT Blood Glucose.: 206 mg/dL (03-13-25 @ 21:15)  POCT Blood Glucose.: 176 mg/dL (03-13-25 @ 17:29)  POCT Blood Glucose.: 238 mg/dL (03-13-25 @ 12:10)  POCT Blood Glucose.: 163 mg/dL (03-13-25 @ 07:23)  POCT Blood Glucose.: 234 mg/dL (03-12-25 @ 22:11)  POCT Blood Glucose.: 125 mg/dL (03-12-25 @ 17:12)  POCT Blood Glucose.: 94 mg/dL (03-12-25 @ 12:18)                            12.4   8.44  )-----------( 593      ( 15 Mar 2025 09:34 )             38.9       03-15    138  |  105  |  20  ----------------------------<  240[H]  4.2   |  22  |  0.99    eGFR: 88    Ca    8.3[L]      03-15  Mg     2.4     03-15  Phos  2.5     03-15        Thyroid Function Tests:  03-03 @ 00:37 TSH 3.90 FreeT4 -- T3 -- Anti TPO -- Anti Thyroglobulin Ab -- TSI --      A1C with Estimated Average Glucose Result: 9.3 % (03-08-25 @ 05:35)      C-Peptide, Serum: 0.2 ng/mL (03-11 @ 13:04)  C-Peptide, Serum: 0.8 ng/mL (03-11 @ 09:02)      Contact number: loli [phone] or [phone]       Diabetes Follow up note:    Chief complaint: T2DM    Interval Hx: BG values 200s over the past 24 hours. Pt seen at bedside. Reports going to eat breakfast at time of visit, improved appetite over past 24 hours. Has questions regarding infection. On insulin at home.     Review of Systems:   General: denies pain  GI: Tolerating POs. Denies N/V/D/Abd pain  CV: Denies CP/SOB  ENDO: No S&Sx of hypoglycemia    MEDS:  insulin glargine Injectable (LANTUS) 22 Unit(s) SubCutaneous at bedtime  insulin lispro (ADMELOG) corrective regimen sliding scale   SubCutaneous at bedtime  insulin lispro (ADMELOG) corrective regimen sliding scale   SubCutaneous three times a day before meals  insulin lispro Injectable (ADMELOG) 7 Unit(s) SubCutaneous three times a day with meals    cefTRIAXone   IVPB      cefTRIAXone   IVPB 1000 milliGRAM(s) IV Intermittent every 24 hours  metroNIDAZOLE    Tablet 500 milliGRAM(s) Oral every 12 hours    Allergies    No Known Allergies      PE:  General: Male lying in bed. NAD.   Vital Signs Last 24 Hrs  T(C): 36.6 (15 Mar 2025 09:42), Max: 36.7 (14 Mar 2025 13:34)  T(F): 97.9 (15 Mar 2025 09:42), Max: 98.1 (14 Mar 2025 13:34)  HR: 70 (15 Mar 2025 09:42) (60 - 90)  BP: 106/65 (15 Mar 2025 09:42) (99/63 - 111/68)  BP(mean): --  RR: 18 (15 Mar 2025 09:42) (18 - 18)  SpO2: 97% (15 Mar 2025 09:42) (95% - 99%)    Parameters below as of 15 Mar 2025 09:42  Patient On (Oxygen Delivery Method): room air      Abd: Soft, NT,ND,   Extremities: Warm. no edema  Neuro: A&O X3    LABS:  POCT Blood Glucose.: 261 mg/dL (03-15-25 @ 08:11)  POCT Blood Glucose.: 240 mg/dL (03-14-25 @ 22:00)  POCT Blood Glucose.: 232 mg/dL (03-14-25 @ 17:35)  POCT Blood Glucose.: 222 mg/dL (03-14-25 @ 13:34)  POCT Blood Glucose.: 186 mg/dL (03-14-25 @ 09:24)  POCT Blood Glucose.: 206 mg/dL (03-13-25 @ 21:15)  POCT Blood Glucose.: 176 mg/dL (03-13-25 @ 17:29)  POCT Blood Glucose.: 238 mg/dL (03-13-25 @ 12:10)  POCT Blood Glucose.: 163 mg/dL (03-13-25 @ 07:23)  POCT Blood Glucose.: 234 mg/dL (03-12-25 @ 22:11)  POCT Blood Glucose.: 125 mg/dL (03-12-25 @ 17:12)  POCT Blood Glucose.: 94 mg/dL (03-12-25 @ 12:18)                            12.4   8.44  )-----------( 593      ( 15 Mar 2025 09:34 )             38.9       03-15    138  |  105  |  20  ----------------------------<  240[H]  4.2   |  22  |  0.99    eGFR: 88    Ca    8.3[L]      03-15  Mg     2.4     03-15  Phos  2.5     03-15        Thyroid Function Tests:  03-03 @ 00:37 TSH 3.90 FreeT4 -- T3 -- Anti TPO -- Anti Thyroglobulin Ab -- TSI --      A1C with Estimated Average Glucose Result: 9.3 % (03-08-25 @ 05:35)      C-Peptide, Serum: 0.2 ng/mL (03-11 @ 13:04)  C-Peptide, Serum: 0.8 ng/mL (03-11 @ 09:02)      Contact number: loli [phone] or [phone]

## 2025-03-15 NOTE — PROGRESS NOTE ADULT - ASSESSMENT
59M PMH DM, pancreatic cancer, s/p whipple in [facility] presents with scrotal and perineal swelling c/f Fornier's gangrene. The patient is now s/p I&D of Perineum and Groin, and Scrotal Exploration with ACS/Trauma and Urology now s/p Irrigation and debridement of perineum and groin.     Recommendations  - Wet to dry dressing changes  - Wound care recs appreciated - pt will f/u in wound care center upon d/c   - ID recs appreciated - continue with Flagyl and IV rocephin   - Endo following, appreciate recs   - DVT ppx: Lvx  - Diet: CC  - pain control PRN   - Dispo planning ongoing - home vs EM    ACS/Trauma Surgery  [id number]

## 2025-03-16 LAB
ANION GAP SERPL CALC-SCNC: 12 MMOL/L — SIGNIFICANT CHANGE UP (ref 5–17)
BUN SERPL-MCNC: 18 MG/DL — SIGNIFICANT CHANGE UP (ref 7–23)
CALCIUM SERPL-MCNC: 8.1 MG/DL — LOW (ref 8.4–10.5)
CHLORIDE SERPL-SCNC: 103 MMOL/L — SIGNIFICANT CHANGE UP (ref 96–108)
CO2 SERPL-SCNC: 20 MMOL/L — LOW (ref 22–31)
CREAT SERPL-MCNC: 0.84 MG/DL — SIGNIFICANT CHANGE UP (ref 0.5–1.3)
EGFR: 101 ML/MIN/1.73M2 — SIGNIFICANT CHANGE UP
EGFR: 101 ML/MIN/1.73M2 — SIGNIFICANT CHANGE UP
GLUCOSE BLDC GLUCOMTR-MCNC: 164 MG/DL — HIGH (ref 70–99)
GLUCOSE BLDC GLUCOMTR-MCNC: 179 MG/DL — HIGH (ref 70–99)
GLUCOSE BLDC GLUCOMTR-MCNC: 185 MG/DL — HIGH (ref 70–99)
GLUCOSE BLDC GLUCOMTR-MCNC: 194 MG/DL — HIGH (ref 70–99)
GLUCOSE BLDC GLUCOMTR-MCNC: 205 MG/DL — HIGH (ref 70–99)
HGB BLD-MCNC: 11.4 G/DL — LOW (ref 13–17)
MAGNESIUM SERPL-MCNC: 2.2 MG/DL — SIGNIFICANT CHANGE UP (ref 1.6–2.6)
MCHC RBC-ENTMCNC: 26.8 PG — LOW (ref 27–34)
MCHC RBC-ENTMCNC: 32.5 G/DL — SIGNIFICANT CHANGE UP (ref 32–36)
MCV RBC AUTO: 82.6 FL — SIGNIFICANT CHANGE UP (ref 80–100)
NRBC BLD AUTO-RTO: 0 /100 WBCS — SIGNIFICANT CHANGE UP (ref 0–0)
PHOSPHATE SERPL-MCNC: 2.5 MG/DL — SIGNIFICANT CHANGE UP (ref 2.5–4.5)
PLATELET # BLD AUTO: 604 K/UL — HIGH (ref 150–400)
POTASSIUM SERPL-MCNC: 4 MMOL/L — SIGNIFICANT CHANGE UP (ref 3.5–5.3)
POTASSIUM SERPL-SCNC: 4 MMOL/L — SIGNIFICANT CHANGE UP (ref 3.5–5.3)
RBC # BLD: 4.25 M/UL — SIGNIFICANT CHANGE UP (ref 4.2–5.8)
RBC # FLD: 14.9 % — HIGH (ref 10.3–14.5)
SODIUM SERPL-SCNC: 135 MMOL/L — SIGNIFICANT CHANGE UP (ref 135–145)
WBC # BLD: 7.5 K/UL — SIGNIFICANT CHANGE UP (ref 3.8–10.5)
WBC # FLD AUTO: 7.5 K/UL — SIGNIFICANT CHANGE UP (ref 3.8–10.5)

## 2025-03-16 PROCEDURE — 99024 POSTOP FOLLOW-UP VISIT: CPT

## 2025-03-16 RX ORDER — SODIUM PHOSPHATE,DIBASIC DIHYD
15 POWDER (GRAM) MISCELLANEOUS ONCE
Refills: 0 | Status: COMPLETED | OUTPATIENT
Start: 2025-03-16 | End: 2025-03-16

## 2025-03-16 RX ADMIN — INSULIN LISPRO 1: 100 INJECTION, SOLUTION INTRAVENOUS; SUBCUTANEOUS at 09:28

## 2025-03-16 RX ADMIN — Medication 1 APPLICATION(S): at 13:18

## 2025-03-16 RX ADMIN — SODIUM HYPOCHLORITE 1 APPLICATION(S): 0.12 SOLUTION TOPICAL at 05:18

## 2025-03-16 RX ADMIN — OXYCODONE HYDROCHLORIDE 2.5 MILLIGRAM(S): 30 TABLET ORAL at 11:12

## 2025-03-16 RX ADMIN — Medication 1 CAPSULE(S): at 17:53

## 2025-03-16 RX ADMIN — OXYCODONE HYDROCHLORIDE 2.5 MILLIGRAM(S): 30 TABLET ORAL at 19:14

## 2025-03-16 RX ADMIN — INSULIN LISPRO 9 UNIT(S): 100 INJECTION, SOLUTION INTRAVENOUS; SUBCUTANEOUS at 09:29

## 2025-03-16 RX ADMIN — SODIUM HYPOCHLORITE 1 APPLICATION(S): 0.12 SOLUTION TOPICAL at 17:52

## 2025-03-16 RX ADMIN — INSULIN GLARGINE-YFGN 26 UNIT(S): 100 INJECTION, SOLUTION SUBCUTANEOUS at 22:37

## 2025-03-16 RX ADMIN — Medication 500 MILLIGRAM(S): at 17:53

## 2025-03-16 RX ADMIN — INSULIN LISPRO 1: 100 INJECTION, SOLUTION INTRAVENOUS; SUBCUTANEOUS at 13:22

## 2025-03-16 RX ADMIN — Medication 500 MILLIGRAM(S): at 05:17

## 2025-03-16 RX ADMIN — ENOXAPARIN SODIUM 40 MILLIGRAM(S): 100 INJECTION SUBCUTANEOUS at 13:16

## 2025-03-16 RX ADMIN — INSULIN LISPRO 9 UNIT(S): 100 INJECTION, SOLUTION INTRAVENOUS; SUBCUTANEOUS at 17:54

## 2025-03-16 RX ADMIN — SODIUM HYPOCHLORITE 1 APPLICATION(S): 0.12 SOLUTION TOPICAL at 22:38

## 2025-03-16 RX ADMIN — INSULIN LISPRO 9 UNIT(S): 100 INJECTION, SOLUTION INTRAVENOUS; SUBCUTANEOUS at 13:22

## 2025-03-16 RX ADMIN — Medication 1 CAPSULE(S): at 09:27

## 2025-03-16 RX ADMIN — INSULIN LISPRO 1: 100 INJECTION, SOLUTION INTRAVENOUS; SUBCUTANEOUS at 17:54

## 2025-03-16 RX ADMIN — Medication 1 CAPSULE(S): at 13:16

## 2025-03-16 RX ADMIN — CEFTRIAXONE 100 MILLIGRAM(S): 500 INJECTION, POWDER, FOR SOLUTION INTRAMUSCULAR; INTRAVENOUS at 13:16

## 2025-03-16 RX ADMIN — Medication 63.75 MILLIMOLE(S): at 11:13

## 2025-03-16 NOTE — DIETITIAN INITIAL EVALUATION ADULT - ORAL INTAKE PTA/DIET HISTORY
Pt reports having a good appetite and PO intake PTA; consuming >75% of most meals. Follows regular diet without therapeutic restrictions. Pt denies any known food allergies or intolerances. Reports taking vitamin B12 vitamin D and calcium at home. Denies any difficulty chewing/swallowing at this time.

## 2025-03-16 NOTE — DIETITIAN INITIAL EVALUATION ADULT - ENERGY INTAKE
Adequate (%) Pt reports fair appetite, consumed ~75% of lunch tray. States he is focusing on protein and vegetables vs. carbohydrates.

## 2025-03-16 NOTE — DIETITIAN INITIAL EVALUATION ADULT - PERSON TAUGHT/METHOD
Provided PO encouragement with emphasis on protein foods to promote wound healing. Reviewed protein food sources. Mixing instructions provided for Paul. Pt expressed understanding. T2DM education deferred at this time per pt request; encouraged pt to prioritize protein at meals./verbal instruction/teach back - (Patient repeats in own words)/patient instructed

## 2025-03-16 NOTE — PROGRESS NOTE ADULT - ASSESSMENT
59M PMH DM, pancreatic cancer, s/p whipple in [facility] presents with scrotal and perineal swelling c/f Fornier's gangrene. The patient is now s/p I&D of Perineum and Groin, and Scrotal Exploration with ACS/Trauma and Urology now s/p Irrigation and debridement of perineum and groin.     Recommendations  - Wet to dry dressing changes  - Wound care recs appreciated - pt will f/u in wound care center upon d/c   - ID recs appreciated - continue with Flagyl and IV rocephin   - Endo following, appreciate recs   - DVT ppx: Lvx  - Diet: CC  - pain control PRN   - Dispo planning ongoing - home vs EM    ACS/Trauma Surgery  [id number]

## 2025-03-16 NOTE — DIETITIAN INITIAL EVALUATION ADULT - ADD RECOMMEND
Pt requesting home vitamin B12, vitamin D and calcium. Provide encouragement with PO intake, menu selections, and assistance with meals as needed.

## 2025-03-16 NOTE — DIETITIAN INITIAL EVALUATION ADULT - PERTINENT LABORATORY DATA
03-16    135  |  103  |  18  ----------------------------<  233[H]  4.0   |  20[L]  |  0.84    Ca    8.1[L]      16 Mar 2025 07:33  Phos  2.5     03-16  Mg     2.2     03-16    POCT Blood Glucose.: 179 mg/dL (03-16-25 @ 13:16)  A1C with Estimated Average Glucose Result: 9.3 % (03-08-25 @ 05:35)

## 2025-03-16 NOTE — DIETITIAN INITIAL EVALUATION ADULT - PERTINENT MEDS FT
MEDICATIONS  (STANDING):  cefTRIAXone   IVPB      cefTRIAXone   IVPB 1000 milliGRAM(s) IV Intermittent every 24 hours  chlorhexidine 2% Cloths 1 Application(s) Topical <User Schedule>  Dakins Solution - 1/4 Strength 1 Application(s) Topical three times a day  enoxaparin Injectable 40 milliGRAM(s) SubCutaneous every 24 hours  insulin glargine Injectable (LANTUS) 26 Unit(s) SubCutaneous at bedtime  insulin lispro (ADMELOG) corrective regimen sliding scale   SubCutaneous at bedtime  insulin lispro (ADMELOG) corrective regimen sliding scale   SubCutaneous three times a day before meals  insulin lispro Injectable (ADMELOG) 9 Unit(s) SubCutaneous three times a day with meals  melatonin 3 milliGRAM(s) Oral at bedtime  metroNIDAZOLE    Tablet 500 milliGRAM(s) Oral every 12 hours  pancrelipase  (CREON 12,000 Lipase Units) 1 Capsule(s) Oral three times a day with meals  senna 2 Tablet(s) Oral at bedtime    MEDICATIONS  (PRN):  acetaminophen   IVPB .. 1000 milliGRAM(s) IV Intermittent every 6 hours PRN Mild Pain (1 - 3)  dextrose Oral Gel 15 Gram(s) Oral once PRN Blood Glucose LESS THAN 70 milliGRAM(s)/deciliter  oxyCODONE    IR 2.5 milliGRAM(s) Oral every 4 hours PRN Moderate Pain (4 - 6)

## 2025-03-16 NOTE — PROGRESS NOTE ADULT - SUBJECTIVE AND OBJECTIVE BOX
SURGERY DAILY PROGRESS NOTE    Overnight Events: No acute events overnight    SUBJECTIVE: Patient seen and evaluated on AM rounds. Pt is resting comfortably in bed with no complaints. Passing flatus and having bowel movements. Tolerating diet and Ambulating well.  Pain is adequately controlled on current regimen.  Denies fevers/chills, chest pain, dyspnea, abdominal pain, nausea, vomiting, and diarrhea    -----------------------------------------------------------------------------------------------------------------------------------------------------------------------------------------------------------  OBJECTIVE:  Vital Signs Last 24 Hrs  T(C): 36.5 (16 Mar 2025 08:37), Max: 36.9 (15 Mar 2025 21:03)  T(F): 97.7 (16 Mar 2025 08:37), Max: 98.4 (15 Mar 2025 21:03)  HR: 63 (16 Mar 2025 08:37) (63 - 82)  BP: 99/63 (16 Mar 2025 08:37) (97/61 - 113/71)  BP(mean): --  RR: 18 (16 Mar 2025 08:37) (18 - 18)  SpO2: 97% (16 Mar 2025 08:37) (96% - 98%)    Parameters below as of 16 Mar 2025 08:37  Patient On (Oxygen Delivery Method): room air      I&O's Detail    15 Mar 2025 07:01  -  16 Mar 2025 07:00  --------------------------------------------------------  IN:    IV PiggyBack: 550 mL    Oral Fluid: 480 mL  Total IN: 1030 mL    OUT:    Voided (mL): 500 mL  Total OUT: 500 mL    Total NET: 530 mL        Daily     Daily   MEDICATIONS  (STANDING):  cefTRIAXone   IVPB      cefTRIAXone   IVPB 1000 milliGRAM(s) IV Intermittent every 24 hours  chlorhexidine 2% Cloths 1 Application(s) Topical <User Schedule>  Dakins Solution - 1/4 Strength 1 Application(s) Topical three times a day  enoxaparin Injectable 40 milliGRAM(s) SubCutaneous every 24 hours  insulin glargine Injectable (LANTUS) 26 Unit(s) SubCutaneous at bedtime  insulin lispro (ADMELOG) corrective regimen sliding scale   SubCutaneous at bedtime  insulin lispro (ADMELOG) corrective regimen sliding scale   SubCutaneous three times a day before meals  insulin lispro Injectable (ADMELOG) 9 Unit(s) SubCutaneous three times a day with meals  melatonin 3 milliGRAM(s) Oral at bedtime  metroNIDAZOLE    Tablet 500 milliGRAM(s) Oral every 12 hours  pancrelipase  (CREON 12,000 Lipase Units) 1 Capsule(s) Oral three times a day with meals  senna 2 Tablet(s) Oral at bedtime    MEDICATIONS  (PRN):  acetaminophen   IVPB .. 1000 milliGRAM(s) IV Intermittent every 6 hours PRN Mild Pain (1 - 3)  dextrose Oral Gel 15 Gram(s) Oral once PRN Blood Glucose LESS THAN 70 milliGRAM(s)/deciliter  oxyCODONE    IR 2.5 milliGRAM(s) Oral every 4 hours PRN Moderate Pain (4 - 6)      PHYSICAL EXAM:  General: NAD, resting comfortably in bed  HEENT: Normocephalic atraumatic  Respiratory: Nonlabored respirations  : dressing c/d/i  Neuro: awake, alert and answering questions appropriately     LABS:                        11.4   7.50  )-----------( 604      ( 16 Mar 2025 07:33 )             35.1     03-16    135  |  103  |  18  ----------------------------<  233[H]  4.0   |  20[L]  |  0.84    Ca    8.1[L]      16 Mar 2025 07:33  Phos  2.5     03-16  Mg     2.2     03-16        Urinalysis Basic - ( 16 Mar 2025 07:33 )    Color: x / Appearance: x / SG: x / pH: x  Gluc: 233 mg/dL / Ketone: x  / Bili: x / Urobili: x   Blood: x / Protein: x / Nitrite: x   Leuk Esterase: x / RBC: x / WBC x   Sq Epi: x / Non Sq Epi: x / Bacteria: x                RADIOLOGY:

## 2025-03-16 NOTE — DIETITIAN INITIAL EVALUATION ADULT - OTHER INFO
- Hx T2DM, Hb1c 9.3%. At home, on Lantus 26 units and lispro 10-12 units.    Weight Hx:  - Pt reports UBW 63-64kg without any known changes. Dosing wt 64kg.

## 2025-03-17 LAB
ANION GAP SERPL CALC-SCNC: 11 MMOL/L — SIGNIFICANT CHANGE UP (ref 5–17)
BUN SERPL-MCNC: 23 MG/DL — SIGNIFICANT CHANGE UP (ref 7–23)
CALCIUM SERPL-MCNC: 8.9 MG/DL — SIGNIFICANT CHANGE UP (ref 8.4–10.5)
CHLORIDE SERPL-SCNC: 104 MMOL/L — SIGNIFICANT CHANGE UP (ref 96–108)
CO2 SERPL-SCNC: 23 MMOL/L — SIGNIFICANT CHANGE UP (ref 22–31)
CREAT SERPL-MCNC: 0.8 MG/DL — SIGNIFICANT CHANGE UP (ref 0.5–1.3)
EGFR: 103 ML/MIN/1.73M2 — SIGNIFICANT CHANGE UP
EGFR: 103 ML/MIN/1.73M2 — SIGNIFICANT CHANGE UP
GLUCOSE BLDC GLUCOMTR-MCNC: 109 MG/DL — HIGH (ref 70–99)
GLUCOSE BLDC GLUCOMTR-MCNC: 145 MG/DL — HIGH (ref 70–99)
GLUCOSE BLDC GLUCOMTR-MCNC: 221 MG/DL — HIGH (ref 70–99)
GLUCOSE BLDC GLUCOMTR-MCNC: 88 MG/DL — SIGNIFICANT CHANGE UP (ref 70–99)
GLUCOSE SERPL-MCNC: 203 MG/DL — HIGH (ref 70–99)
HCT VFR BLD CALC: 37.8 % — LOW (ref 39–50)
HGB BLD-MCNC: 12.4 G/DL — LOW (ref 13–17)
MAGNESIUM SERPL-MCNC: 2.3 MG/DL — SIGNIFICANT CHANGE UP (ref 1.6–2.6)
MCHC RBC-ENTMCNC: 27.4 PG — SIGNIFICANT CHANGE UP (ref 27–34)
MCHC RBC-ENTMCNC: 32.8 G/DL — SIGNIFICANT CHANGE UP (ref 32–36)
NRBC BLD AUTO-RTO: 0 /100 WBCS — SIGNIFICANT CHANGE UP (ref 0–0)
PHOSPHATE SERPL-MCNC: 3.1 MG/DL — SIGNIFICANT CHANGE UP (ref 2.5–4.5)
PLATELET # BLD AUTO: 693 K/UL — HIGH (ref 150–400)
POTASSIUM SERPL-MCNC: 4.1 MMOL/L — SIGNIFICANT CHANGE UP (ref 3.5–5.3)
POTASSIUM SERPL-SCNC: 4.1 MMOL/L — SIGNIFICANT CHANGE UP (ref 3.5–5.3)
RBC # BLD: 4.52 M/UL — SIGNIFICANT CHANGE UP (ref 4.2–5.8)
RBC # FLD: 14.9 % — HIGH (ref 10.3–14.5)
SODIUM SERPL-SCNC: 138 MMOL/L — SIGNIFICANT CHANGE UP (ref 135–145)
WBC # BLD: 7.79 K/UL — SIGNIFICANT CHANGE UP (ref 3.8–10.5)
WBC # FLD AUTO: 7.79 K/UL — SIGNIFICANT CHANGE UP (ref 3.8–10.5)

## 2025-03-17 PROCEDURE — 99232 SBSQ HOSP IP/OBS MODERATE 35: CPT

## 2025-03-17 PROCEDURE — G0545: CPT

## 2025-03-17 RX ADMIN — INSULIN LISPRO 9 UNIT(S): 100 INJECTION, SOLUTION INTRAVENOUS; SUBCUTANEOUS at 14:45

## 2025-03-17 RX ADMIN — Medication 1 CAPSULE(S): at 18:49

## 2025-03-17 RX ADMIN — Medication 1 CAPSULE(S): at 09:54

## 2025-03-17 RX ADMIN — Medication 1 CAPSULE(S): at 14:45

## 2025-03-17 RX ADMIN — Medication 3 MILLIGRAM(S): at 00:55

## 2025-03-17 RX ADMIN — SODIUM HYPOCHLORITE 1 APPLICATION(S): 0.12 SOLUTION TOPICAL at 05:22

## 2025-03-17 RX ADMIN — INSULIN LISPRO 2: 100 INJECTION, SOLUTION INTRAVENOUS; SUBCUTANEOUS at 09:52

## 2025-03-17 RX ADMIN — ENOXAPARIN SODIUM 40 MILLIGRAM(S): 100 INJECTION SUBCUTANEOUS at 14:13

## 2025-03-17 RX ADMIN — INSULIN LISPRO 9 UNIT(S): 100 INJECTION, SOLUTION INTRAVENOUS; SUBCUTANEOUS at 09:53

## 2025-03-17 RX ADMIN — SODIUM HYPOCHLORITE 1 APPLICATION(S): 0.12 SOLUTION TOPICAL at 14:13

## 2025-03-17 RX ADMIN — CEFTRIAXONE 100 MILLIGRAM(S): 500 INJECTION, POWDER, FOR SOLUTION INTRAMUSCULAR; INTRAVENOUS at 12:57

## 2025-03-17 RX ADMIN — Medication 500 MILLIGRAM(S): at 05:22

## 2025-03-17 RX ADMIN — Medication 3 MILLIGRAM(S): at 21:32

## 2025-03-17 RX ADMIN — Medication 500 MILLIGRAM(S): at 18:49

## 2025-03-17 RX ADMIN — Medication 1 APPLICATION(S): at 09:52

## 2025-03-17 RX ADMIN — INSULIN GLARGINE-YFGN 26 UNIT(S): 100 INJECTION, SOLUTION SUBCUTANEOUS at 21:32

## 2025-03-17 RX ADMIN — SODIUM HYPOCHLORITE 1 APPLICATION(S): 0.12 SOLUTION TOPICAL at 21:32

## 2025-03-17 NOTE — PROGRESS NOTE ADULT - ASSESSMENT
59M PMH DM, pancreatic cancer, s/p whipple in [facility] [place] presents with scrotal and perineal swelling c/f Fornier's gangrene. The patient is now s/p I&D of Perineum and Groin, and Scrotal Exploration with ACS/Trauma and Urology now s/p Irrigation and debridement of perineum and groin.     Recommendations  - Wet to dry dressing changes  - Wound care recs appreciated - pt will f/u in wound care center upon d/c   - ID recs appreciated - continue with Flagyl and IV rocephin   - Endo following, appreciate recs   - DVT ppx: Lvx  - Diet: CC  - pain control PRN   - Dispo planning ongoing - home vs [facility]    Acute care surgery, pager#[phone]

## 2025-03-17 NOTE — PROGRESS NOTE ADULT - SUBJECTIVE AND OBJECTIVE BOX
infectious diseases progress note:    Patient is a 59y old  Male who presents with a chief complaint of Pema's gangrene     (16 Mar 2025 14:27)        Pema's gangrene               Allergies    No Known Allergies    Intolerances        ANTIBIOTICS/RELEVANT:  antimicrobials  cefTRIAXone   IVPB      cefTRIAXone   IVPB 1000 milliGRAM(s) IV Intermittent every 24 hours  metroNIDAZOLE    Tablet 500 milliGRAM(s) Oral every 12 hours    immunologic:    OTHER:  acetaminophen   IVPB .. 1000 milliGRAM(s) IV Intermittent every 6 hours PRN  chlorhexidine 2% Cloths 1 Application(s) Topical <User Schedule>  Dakins Solution - 1/4 Strength 1 Application(s) Topical three times a day  dextrose Oral Gel 15 Gram(s) Oral once PRN  enoxaparin Injectable 40 milliGRAM(s) SubCutaneous every 24 hours  insulin glargine Injectable (LANTUS) 26 Unit(s) SubCutaneous at bedtime  insulin lispro (ADMELOG) corrective regimen sliding scale   SubCutaneous at bedtime  insulin lispro (ADMELOG) corrective regimen sliding scale   SubCutaneous three times a day before meals  insulin lispro Injectable (ADMELOG) 9 Unit(s) SubCutaneous three times a day with meals  melatonin 3 milliGRAM(s) Oral at bedtime  pancrelipase  (CREON 12,000 Lipase Units) 1 Capsule(s) Oral three times a day with meals  senna 2 Tablet(s) Oral at bedtime      Objective:  Vital Signs Last 24 Hrs  T(C): 36.6 (17 Mar 2025 05:19), Max: 36.9 (17 Mar 2025 00:36)  T(F): 97.9 (17 Mar 2025 05:19), Max: 98.5 (17 Mar 2025 00:36)  HR: 79 (17 Mar 2025 05:19) (76 - 88)  BP: 101/64 (17 Mar 2025 05:19) (100/62 - 102/72)  BP(mean): --  RR: 18 (17 Mar 2025 05:19) (18 - 18)  SpO2: 96% (17 Mar 2025 05:19) (95% - 99%)    Parameters below as of 17 Mar 2025 05:19  Patient On (Oxygen Delivery Method): room air       ell nourished--no acute distress  Eyes:DOE, EOMI  Ear/Nose/Throat: no oral lesion, no sinus tenderness on percussion	  Neck:no JVD, no lymphadenopathy, supple  Respiratory: CTA elena  Cardiovascular: S1S2 RRR, no murmurs  Gastrointestinal:soft, (+) BS, no HSM  Extremities:no e/e/c        LABS:                        12.4   7.79  )-----------( 693      ( 17 Mar 2025 06:56 )             37.8     03-17    138  |  104  |  23  ----------------------------<  203[H]  4.1   |  23  |  0.80    Ca    8.9      17 Mar 2025 06:54  Phos  3.1     03-17  Mg     2.3     03-17        Urinalysis Basic - ( 17 Mar 2025 06:54 )    Color: x / Appearance: x / SG: x / pH: x  Gluc: 203 mg/dL / Ketone: x  / Bili: x / Urobili: x   Blood: x / Protein: x / Nitrite: x   Leuk Esterase: x / RBC: x / WBC x   Sq Epi: x / Non Sq Epi: x / Bacteria: x          MICROBIOLOGY:    RECENT CULTURES:        RESPIRATORY CULTURES:              RADIOLOGY & ADDITIONAL STUDIES:        Pager [phone]  After 5 pm/weekends or if no response :[phone]

## 2025-03-17 NOTE — PROGRESS NOTE ADULT - SUBJECTIVE AND OBJECTIVE BOX
SURGERY DAILY PROGRESS NOTE:       Subjective / Overnight events:  No acute overnight events.  Pain controlled.  Tolerating diet, denies N/V.      Objective:      MEDICATIONS  (STANDING):  cefTRIAXone   IVPB      cefTRIAXone   IVPB 1000 milliGRAM(s) IV Intermittent every 24 hours  chlorhexidine 2% Cloths 1 Application(s) Topical <User Schedule>  Dakins Solution - 1/4 Strength 1 Application(s) Topical three times a day  enoxaparin Injectable 40 milliGRAM(s) SubCutaneous every 24 hours  insulin glargine Injectable (LANTUS) 26 Unit(s) SubCutaneous at bedtime  insulin lispro (ADMELOG) corrective regimen sliding scale   SubCutaneous at bedtime  insulin lispro (ADMELOG) corrective regimen sliding scale   SubCutaneous three times a day before meals  insulin lispro Injectable (ADMELOG) 9 Unit(s) SubCutaneous three times a day with meals  melatonin 3 milliGRAM(s) Oral at bedtime  metroNIDAZOLE    Tablet 500 milliGRAM(s) Oral every 12 hours  pancrelipase  (CREON 12,000 Lipase Units) 1 Capsule(s) Oral three times a day with meals  senna 2 Tablet(s) Oral at bedtime    MEDICATIONS  (PRN):  acetaminophen   IVPB .. 1000 milliGRAM(s) IV Intermittent every 6 hours PRN Mild Pain (1 - 3)  dextrose Oral Gel 15 Gram(s) Oral once PRN Blood Glucose LESS THAN 70 milliGRAM(s)/deciliter      Vital Signs Last 24 Hrs  T(C): 36.6 (17 Mar 2025 05:19), Max: 36.9 (17 Mar 2025 00:36)  T(F): 97.9 (17 Mar 2025 05:19), Max: 98.5 (17 Mar 2025 00:36)  HR: 79 (17 Mar 2025 05:19) (63 - 88)  BP: 101/64 (17 Mar 2025 05:19) (99/63 - 102/72)  BP(mean): --  RR: 18 (17 Mar 2025 05:19) (18 - 18)  SpO2: 96% (17 Mar 2025 05:19) (95% - 99%)    Parameters below as of 17 Mar 2025 05:19  Patient On (Oxygen Delivery Method): room air        I&O's Detail    16 Mar 2025 07:01  -  17 Mar 2025 07:00  --------------------------------------------------------  IN:    IV PiggyBack: 50 mL    IV PiggyBack: 250 mL    Oral Fluid: 500 mL  Total IN: 800 mL    OUT:    Voided (mL): 1100 mL  Total OUT: 1100 mL    Total NET: -300 mL          Daily     Daily     LABS:                        12.4   7.79  )-----------( 693      ( 17 Mar 2025 06:56 )             37.8     03-17    138  |  104  |  23  ----------------------------<  203[H]  4.1   |  23  |  0.80    Ca    8.9      17 Mar 2025 06:54  Phos  3.1     03-17  Mg     2.3     03-17        Urinalysis Basic - ( 17 Mar 2025 06:54 )    Color: x / Appearance: x / SG: x / pH: x  Gluc: 203 mg/dL / Ketone: x  / Bili: x / Urobili: x   Blood: x / Protein: x / Nitrite: x   Leuk Esterase: x / RBC: x / WBC x   Sq Epi: x / Non Sq Epi: x / Bacteria: x          PHYSICAL EXAM:  General: NAD, resting comfortably in bed  HEENT: Normocephalic atraumatic  Respiratory: Nonlabored respirations  : dressing c/d/i  Neuro: awake, alert and answering questions appropriately

## 2025-03-17 NOTE — PROGRESS NOTE ADULT - ASSESSMENT
66 year old s/p Whipple's for pancreatic cancer .    Recent treated  for perirectal abscess admitted with selling, gas and pain in scrotum. Underwent debridement several items   cultures \  polymicrobial with E-COLI Prevotella Edwardsiella   current wd pack and doing well  non toxic    Resolving necrotizing scrotal infection Unclear if related to pancreatic cancer   will need several weeks of ab    changed  to ceftriaxone and metronidazole for easy of administration   wd improved  packed   pt lives by himself and will need wound  and IV antibiotics  will likely go to EM  discussed with team    Would plan  to continue antibiotics until 3/28  wd is clean

## 2025-03-17 NOTE — PROGRESS NOTE ADULT - ASSESSMENT
59y Male with hx of T2DM, HTN, HLD, pancreatic adenocarcinoma s/p noéippbyron (2017), presents with scrotal and perineal swelling. 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     Endocrine was consulted for T2DM. Patient is feeling okay, eating mostly full meals now and tolerating POs. Patient is s/p I&D. Patient remains on IV abx.   BGs mildly hyperglycemic  to the low 200s. BG Goal 100-180mg/dl.

## 2025-03-17 NOTE — PROGRESS NOTE ADULT - SUBJECTIVE AND OBJECTIVE BOX
Chief Complaint: Diabetes Mellitus follow up    INTERVAL HX:  Patient seen at bedside. He is feeling better.   Reports eating well, finishes most food on each tray.   BG over the last 24 hrs have been within goal range 100-180mg/dl. No hypoglycemia.     Review of Systems:  General: As above  GI: No nausea, vomiting  Endocrine: no  S&Sx of hypoglycemia    Allergies    No Known Allergies    Intolerances      MEDICATIONS  (STANDING):  cefTRIAXone   IVPB      cefTRIAXone   IVPB 1000 milliGRAM(s) IV Intermittent every 24 hours  chlorhexidine 2% Cloths 1 Application(s) Topical <User Schedule>  Dakins Solution - 1/4 Strength 1 Application(s) Topical three times a day  enoxaparin Injectable 40 milliGRAM(s) SubCutaneous every 24 hours  insulin glargine Injectable (LANTUS) 26 Unit(s) SubCutaneous at bedtime  insulin lispro (ADMELOG) corrective regimen sliding scale   SubCutaneous at bedtime  insulin lispro (ADMELOG) corrective regimen sliding scale   SubCutaneous three times a day before meals  insulin lispro Injectable (ADMELOG) 9 Unit(s) SubCutaneous three times a day with meals  melatonin 3 milliGRAM(s) Oral at bedtime  metroNIDAZOLE    Tablet 500 milliGRAM(s) Oral every 12 hours  pancrelipase  (CREON 12,000 Lipase Units) 1 Capsule(s) Oral three times a day with meals  senna 2 Tablet(s) Oral at bedtime        insulin glargine Injectable (LANTUS)   26 Unit(s) SubCutaneous (03-16-25 @ 22:37)    insulin lispro (ADMELOG) corrective regimen sliding scale   2 Unit(s) SubCutaneous (03-17-25 @ 09:52)   1 Unit(s) SubCutaneous (03-16-25 @ 17:54)   1 Unit(s) SubCutaneous (03-16-25 @ 13:22)    insulin lispro Injectable (ADMELOG)   9 Unit(s) SubCutaneous (03-17-25 @ 09:53)   9 Unit(s) SubCutaneous (03-16-25 @ 17:54)   9 Unit(s) SubCutaneous (03-16-25 @ 13:22)        PHYSICAL EXAM:  VITALS: T(C): 36.5 (03-17-25 @ 08:37)  T(F): 97.7 (03-17-25 @ 08:37), Max: 98.5 (03-17-25 @ 00:36)  HR: 61 (03-17-25 @ 08:37) (61 - 88)  BP: 118/76 (03-17-25 @ 08:37) (100/62 - 118/76)  RR:  (18 - 18)  SpO2:  (95% - 99%)  Wt(kg): --  GENERAL: NAD  Respiratory: Respirations unlabored   Extremities: Warm, no edema  NEURO: Alert , appropriate     LABS:  POCT Blood Glucose.: 221 mg/dL (03-17-25 @ 09:44)  POCT Blood Glucose.: 164 mg/dL (03-16-25 @ 21:50)  POCT Blood Glucose.: 185 mg/dL (03-16-25 @ 17:41)  POCT Blood Glucose.: 179 mg/dL (03-16-25 @ 13:16)  POCT Blood Glucose.: 194 mg/dL (03-16-25 @ 09:21)  POCT Blood Glucose.: 205 mg/dL (03-16-25 @ 00:32)  POCT Blood Glucose.: 159 mg/dL (03-15-25 @ 21:10)  POCT Blood Glucose.: 230 mg/dL (03-15-25 @ 18:03)  POCT Blood Glucose.: 169 mg/dL (03-15-25 @ 12:43)  POCT Blood Glucose.: 261 mg/dL (03-15-25 @ 08:11)  POCT Blood Glucose.: 240 mg/dL (03-14-25 @ 22:00)  POCT Blood Glucose.: 232 mg/dL (03-14-25 @ 17:35)  POCT Blood Glucose.: 222 mg/dL (03-14-25 @ 13:34)                          12.4   7.79  )-----------( 693      ( 17 Mar 2025 06:56 )             37.8     03-17    138  |  104  |  23  ----------------------------<  203[H]  4.1   |  23  |  0.80    Ca    8.9      17 Mar 2025 06:54  Phos  3.1     03-17  Mg     2.3     03-17      eGFR: 103 mL/min/1.73m2 (17 Mar 2025 06:54)      Thyroid Function Tests:  03-03 @ 00:37 TSH 3.90 FreeT4 -- T3 -- Anti TPO -- Anti Thyroglobulin Ab -- TSI --      A1C with Estimated Average Glucose Result: 9.3 % (03-08-25 @ 05:35)    Estimated Average Glucose: 220 mg/dL (03-08-25 @ 05:35)        Diet, Consistent Carbohydrate w/Evening Snack:   Halal (03-17-25 @ 02:39) [Active]

## 2025-03-17 NOTE — PROGRESS NOTE ADULT - PROBLEM SELECTOR PLAN 1
Inpatient Plan:  - Check BG TID AC and HS while on PO diet   - Continue Lantus 26u QHS  - Continue Admelog to 9u TID AC (HOLD if NPO or <50% of meal)  - C/w low dose Admelog correctional scales TID AC and HS  - Please keep all IV abx in NS solution if possible   Discharge Planning:   - Patient to continue with basal/bolus insulin regimen. Dosage TBD closer to d/c based on inpatient glycemic requirement. Low c-peptide, needs insulin.   - Patient should check BG TID AC and HS at home. Can use his FSL2 CGM to monitor BGs but if BG <100mg/dL or >250mg/dL, patient should confirm with fingerstick BG. Please tell patient to contact Endocrinologist if BG <70mg/dL x1 or >200mg/dL consistently or >400mg/dL x1. Declines desire for FSL3 for now.   - Patient to follow up with endocrinologist Dr. Panda at [place].

## 2025-03-17 NOTE — PROGRESS NOTE ADULT - ATTENDING COMMENTS
Seen and examined on 3/17. Pt feeling well. Wound examined at bedside - pink granulation tissue, no induration, no purulence, no fluctuance. Continue wet to dry dressings, cleansing area and redoing dressing after bowel movements. Appreciate ID input - will continue abx till 3/28. Will add Paul for protein supplementation. Awaiting EM placement. Patient was anxious about going to rehab and said he wanted to stay in the hospital longer - we discussed that the wound is doing very well and will take weeks to heal, that he cannot remain in the hospital for those weeks, but that we will keep him until Thursday to give him more time. Patient in agreement.
doing well overall  continue local wound care  disposition planning
wound easily managed with local wound care  disposition planning
As above  now s/p second look debridement by Gen Surg  Penis ans scrotum without necrotic skin changes. Scrotal skin edema as expected  Espinoza with clear yellow urine
Patient seen and examined and agree with above.  Remained afebrile and hemodynamically stable.  Increased leukocytosis but will monitor tomorrow.  VAC in place  with planned change to go to the OR on Wednesday.   WBC 17   Continue tight glycemic control.  Hypophosphatemia- clinically significant and to be repleted and will recheck in the AM.
patient is doing well overall  will use wet to dry for now  instructed to use bedside commode when needs to use rest room  may ultimately need colostomy

## 2025-03-18 LAB
ANION GAP SERPL CALC-SCNC: 11 MMOL/L — SIGNIFICANT CHANGE UP (ref 5–17)
BUN SERPL-MCNC: 20 MG/DL — SIGNIFICANT CHANGE UP (ref 7–23)
CALCIUM SERPL-MCNC: 8.3 MG/DL — LOW (ref 8.4–10.5)
CHLORIDE SERPL-SCNC: 103 MMOL/L — SIGNIFICANT CHANGE UP (ref 96–108)
CO2 SERPL-SCNC: 22 MMOL/L — SIGNIFICANT CHANGE UP (ref 22–31)
CREAT SERPL-MCNC: 0.9 MG/DL — SIGNIFICANT CHANGE UP (ref 0.5–1.3)
EGFR: 99 ML/MIN/1.73M2 — SIGNIFICANT CHANGE UP
EGFR: 99 ML/MIN/1.73M2 — SIGNIFICANT CHANGE UP
GLUCOSE BLDC GLUCOMTR-MCNC: 145 MG/DL — HIGH (ref 70–99)
GLUCOSE BLDC GLUCOMTR-MCNC: 152 MG/DL — HIGH (ref 70–99)
GLUCOSE BLDC GLUCOMTR-MCNC: 156 MG/DL — HIGH (ref 70–99)
GLUCOSE BLDC GLUCOMTR-MCNC: 209 MG/DL — HIGH (ref 70–99)
GLUCOSE BLDC GLUCOMTR-MCNC: 214 MG/DL — HIGH (ref 70–99)
GLUCOSE SERPL-MCNC: 169 MG/DL — HIGH (ref 70–99)
HCT VFR BLD CALC: 36.7 % — LOW (ref 39–50)
HGB BLD-MCNC: 11.9 G/DL — LOW (ref 13–17)
MAGNESIUM SERPL-MCNC: 2.3 MG/DL — SIGNIFICANT CHANGE UP (ref 1.6–2.6)
MCHC RBC-ENTMCNC: 26.7 PG — LOW (ref 27–34)
MCHC RBC-ENTMCNC: 32.4 G/DL — SIGNIFICANT CHANGE UP (ref 32–36)
MCV RBC AUTO: 82.3 FL — SIGNIFICANT CHANGE UP (ref 80–100)
NRBC BLD AUTO-RTO: 0 /100 WBCS — SIGNIFICANT CHANGE UP (ref 0–0)
PHOSPHATE SERPL-MCNC: 3.3 MG/DL — SIGNIFICANT CHANGE UP (ref 2.5–4.5)
PLATELET # BLD AUTO: 677 K/UL — HIGH (ref 150–400)
POTASSIUM SERPL-MCNC: 4.1 MMOL/L — SIGNIFICANT CHANGE UP (ref 3.5–5.3)
POTASSIUM SERPL-SCNC: 4.1 MMOL/L — SIGNIFICANT CHANGE UP (ref 3.5–5.3)
RBC # BLD: 4.46 M/UL — SIGNIFICANT CHANGE UP (ref 4.2–5.8)
RBC # FLD: 15.2 % — HIGH (ref 10.3–14.5)
SODIUM SERPL-SCNC: 136 MMOL/L — SIGNIFICANT CHANGE UP (ref 135–145)
WBC # BLD: 6.26 K/UL — SIGNIFICANT CHANGE UP (ref 3.8–10.5)
WBC # FLD AUTO: 6.26 K/UL — SIGNIFICANT CHANGE UP (ref 3.8–10.5)

## 2025-03-18 RX ORDER — INSULIN LISPRO 100 U/ML
7 INJECTION, SOLUTION INTRAVENOUS; SUBCUTANEOUS
Refills: 0 | Status: DISCONTINUED | OUTPATIENT
Start: 2025-03-18 | End: 2025-03-19

## 2025-03-18 RX ADMIN — SODIUM HYPOCHLORITE 1 APPLICATION(S): 0.12 SOLUTION TOPICAL at 05:35

## 2025-03-18 RX ADMIN — CEFTRIAXONE 100 MILLIGRAM(S): 500 INJECTION, POWDER, FOR SOLUTION INTRAMUSCULAR; INTRAVENOUS at 12:59

## 2025-03-18 RX ADMIN — Medication 1 CAPSULE(S): at 18:30

## 2025-03-18 RX ADMIN — INSULIN LISPRO 7 UNIT(S): 100 INJECTION, SOLUTION INTRAVENOUS; SUBCUTANEOUS at 09:13

## 2025-03-18 RX ADMIN — INSULIN LISPRO 2: 100 INJECTION, SOLUTION INTRAVENOUS; SUBCUTANEOUS at 12:59

## 2025-03-18 RX ADMIN — Medication 1 CAPSULE(S): at 09:14

## 2025-03-18 RX ADMIN — INSULIN LISPRO 7 UNIT(S): 100 INJECTION, SOLUTION INTRAVENOUS; SUBCUTANEOUS at 18:29

## 2025-03-18 RX ADMIN — Medication 1 APPLICATION(S): at 13:00

## 2025-03-18 RX ADMIN — Medication 1 CAPSULE(S): at 12:59

## 2025-03-18 RX ADMIN — ENOXAPARIN SODIUM 40 MILLIGRAM(S): 100 INJECTION SUBCUTANEOUS at 18:36

## 2025-03-18 RX ADMIN — INSULIN LISPRO 7 UNIT(S): 100 INJECTION, SOLUTION INTRAVENOUS; SUBCUTANEOUS at 13:00

## 2025-03-18 RX ADMIN — Medication 500 MILLIGRAM(S): at 18:30

## 2025-03-18 RX ADMIN — Medication 500 MILLIGRAM(S): at 05:34

## 2025-03-18 RX ADMIN — INSULIN GLARGINE-YFGN 26 UNIT(S): 100 INJECTION, SOLUTION SUBCUTANEOUS at 21:40

## 2025-03-18 RX ADMIN — Medication 3 MILLIGRAM(S): at 21:42

## 2025-03-18 RX ADMIN — INSULIN LISPRO 1: 100 INJECTION, SOLUTION INTRAVENOUS; SUBCUTANEOUS at 18:29

## 2025-03-18 NOTE — PROGRESS NOTE ADULT - ASSESSMENT
59M PMH DM, pancreatic cancer, s/p whipple in [facility] presents with scrotal and perineal swelling c/f Fornier's gangrene. The patient is now s/p I&D of Perineum and Groin, and Scrotal Exploration with ACS/Trauma and Urology now s/p Irrigation and debridement of perineum and groin.     Recommendations  - Wet to dry dressing changes  - Wound care recs appreciated - pt will f/u in wound care center upon d/c  - ID recs appreciated - continue with Flagyl and IV rocephin   - Endo following, appreciate recs   - DVT ppx: Lvx  - Diet: CC  - pain control PRN   - Dispo planning ongoing - home vs [facility], [place]    Acute care surgery, pager#[phone]

## 2025-03-18 NOTE — PROGRESS NOTE ADULT - SUBJECTIVE AND OBJECTIVE BOX
SURGERY DAILY PROGRESS NOTE:     Overnight Events:  No acute events overnight.    SUBJECTIVE: Patient seen and evaluated on AM rounds. Pt is resting comfortably in bed with no complaints. Denies fever, chills, N/V, chest pain, or shortness of breath. Patient is passing gas and having bowel movements. Tolerating diet. Ambulating well. Pain is adequately controlled on current regimen.    OBJECTIVE:  Vital Signs Last 24 Hrs  T(C): 36.7 (18 Mar 2025 04:54), Max: 37 (18 Mar 2025 01:00)  T(F): 98 (18 Mar 2025 04:54), Max: 98.6 (18 Mar 2025 01:00)  HR: 80 (18 Mar 2025 04:54) (61 - 80)  BP: 116/78 (18 Mar 2025 04:54) (100/67 - 118/76)  BP(mean): --  RR: 18 (18 Mar 2025 04:54) (18 - 18)  SpO2: 97% (18 Mar 2025 04:54) (97% - 99%)    Parameters below as of 18 Mar 2025 04:54  Patient On (Oxygen Delivery Method): room air      I&O's Detail    17 Mar 2025 07:01  -  18 Mar 2025 07:00  --------------------------------------------------------  IN:    IV PiggyBack: 50 mL    Oral Fluid: 610 mL  Total IN: 660 mL    OUT:    Voided (mL): 750 mL  Total OUT: 750 mL    Total NET: -90 mL        Daily     Daily     LABS:                        12.4   7.79  )-----------( 693      ( 17 Mar 2025 06:56 )             37.8     03-17    138  |  104  |  23  ----------------------------<  203[H]  4.1   |  23  |  0.80    Ca    8.9      17 Mar 2025 06:54  Phos  3.1     03-17  Mg     2.3     03-17        Urinalysis Basic - ( 17 Mar 2025 06:54 )    Color: x / Appearance: x / SG: x / pH: x  Gluc: 203 mg/dL / Ketone: x  / Bili: x / Urobili: x   Blood: x / Protein: x / Nitrite: x   Leuk Esterase: x / RBC: x / WBC x   Sq Epi: x / Non Sq Epi: x / Bacteria: x          PHYSICAL EXAM:  General: NAD, resting comfortably in bed  HEENT: Normocephalic atraumatic  Respiratory: Nonlabored respirations  : dressing c/d/i  Neuro: awake, alert and answering questions appropriately 	 SURGERY DAILY PROGRESS NOTE:     Overnight Events:  No acute events overnight.    SUBJECTIVE: Patient seen and evaluated on AM rounds. Pt is resting comfortably in bed with no complaints. Denies fever, chills, N/V, chest pain, or shortness of breath. Patient is passing gas and having bowel movements. Tolerating diet. Pain is adequately controlled on current regimen.    OBJECTIVE:  Vital Signs Last 24 Hrs  T(C): 36.7 (18 Mar 2025 04:54), Max: 37 (18 Mar 2025 01:00)  T(F): 98 (18 Mar 2025 04:54), Max: 98.6 (18 Mar 2025 01:00)  HR: 80 (18 Mar 2025 04:54) (61 - 80)  BP: 116/78 (18 Mar 2025 04:54) (100/67 - 118/76)  BP(mean): --  RR: 18 (18 Mar 2025 04:54) (18 - 18)  SpO2: 97% (18 Mar 2025 04:54) (97% - 99%)    Parameters below as of 18 Mar 2025 04:54  Patient On (Oxygen Delivery Method): room air      I&O's Detail    17 Mar 2025 07:01  -  18 Mar 2025 07:00  --------------------------------------------------------  IN:    IV PiggyBack: 50 mL    Oral Fluid: 610 mL  Total IN: 660 mL    OUT:    Voided (mL): 750 mL  Total OUT: 750 mL    Total NET: -90 mL        Daily     Daily     LABS:                        12.4   7.79  )-----------( 693      ( 17 Mar 2025 06:56 )             37.8     03-17    138  |  104  |  23  ----------------------------<  203[H]  4.1   |  23  |  0.80    Ca    8.9      17 Mar 2025 06:54  Phos  3.1     03-17  Mg     2.3     03-17        Urinalysis Basic - ( 17 Mar 2025 06:54 )    Color: x / Appearance: x / SG: x / pH: x  Gluc: 203 mg/dL / Ketone: x  / Bili: x / Urobili: x   Blood: x / Protein: x / Nitrite: x   Leuk Esterase: x / RBC: x / WBC x   Sq Epi: x / Non Sq Epi: x / Bacteria: x          PHYSICAL EXAM:  General: NAD, resting comfortably in bed  HEENT: Normocephalic atraumatic  Respiratory: Nonlabored respirations  : dressing c/d/i  Neuro: awake, alert and answering questions appropriately

## 2025-03-18 NOTE — PROVIDER CONTACT NOTE (MEDICATION) - ASSESSMENT
pt ordered for dressing changes with dakins packing. Per day RN in report, resident stated to discontinue dakins use and use sterile normal saline for packing. pt still ordered for dakins on EMAR.

## 2025-03-18 NOTE — CHART NOTE - NSCHARTNOTEFT_GEN_A_CORE
AFRICA SOLIZ  Gender: Male  59y  [facility] 2MON 215 W1    Patient not seen today, chart reviewed.     POCT Blood Glucose:  214 mg/dL (03-18-25 @ 12:15)  145 mg/dL (03-18-25 @ 09:06)  156 mg/dL (03-18-25 @ 07:33)  145 mg/dL (03-17-25 @ 21:06)  88 mg/dL (03-17-25 @ 18:25)  109 mg/dL (03-17-25 @ 14:44)      eMAR:  insulin glargine Injectable (LANTUS)   26 Unit(s) SubCutaneous (03-17-25 @ 21:32)    insulin lispro (ADMELOG) corrective regimen sliding scale   2 Unit(s) SubCutaneous (03-18-25 @ 12:59)    insulin lispro Injectable (ADMELOG)   9 Unit(s) SubCutaneous (03-17-25 @ 14:45)    insulin lispro Injectable (ADMELOG)   7 Unit(s) SubCutaneous (03-18-25 @ 13:00)   7 Unit(s) SubCutaneous (03-18-25 @ 09:13)    Endocrinology following patient for T2DM management. In the last 24hrs BG levels have been 88-214mg/dL. FBG stable this am to 156mg/dL. No hypoglycemia. Noted postprandial BG last evening stable but on lower side to 88mg/dL, mealtime insulin held with dinner and postprandial BG at bedtime stable -> will lower mealtime insulin to prevent hypoglycemia. BG goal 100-180mg/dL. Endocrine will closely monitor BG levels.

## 2025-03-19 LAB
ANION GAP SERPL CALC-SCNC: 12 MMOL/L — SIGNIFICANT CHANGE UP (ref 5–17)
BUN SERPL-MCNC: 25 MG/DL — HIGH (ref 7–23)
CALCIUM SERPL-MCNC: 8.7 MG/DL — SIGNIFICANT CHANGE UP (ref 8.4–10.5)
CHLORIDE SERPL-SCNC: 104 MMOL/L — SIGNIFICANT CHANGE UP (ref 96–108)
CO2 SERPL-SCNC: 22 MMOL/L — SIGNIFICANT CHANGE UP (ref 22–31)
CREAT SERPL-MCNC: 0.93 MG/DL — SIGNIFICANT CHANGE UP (ref 0.5–1.3)
EGFR: 95 ML/MIN/1.73M2 — SIGNIFICANT CHANGE UP
EGFR: 95 ML/MIN/1.73M2 — SIGNIFICANT CHANGE UP
GLUCOSE BLDC GLUCOMTR-MCNC: 139 MG/DL — HIGH (ref 70–99)
GLUCOSE BLDC GLUCOMTR-MCNC: 144 MG/DL — HIGH (ref 70–99)
GLUCOSE BLDC GLUCOMTR-MCNC: 159 MG/DL — HIGH (ref 70–99)
GLUCOSE BLDC GLUCOMTR-MCNC: 227 MG/DL — HIGH (ref 70–99)
GLUCOSE SERPL-MCNC: 254 MG/DL — HIGH (ref 70–99)
HCT VFR BLD CALC: 36 % — LOW (ref 39–50)
HGB BLD-MCNC: 11.7 G/DL — LOW (ref 13–17)
MAGNESIUM SERPL-MCNC: 2.3 MG/DL — SIGNIFICANT CHANGE UP (ref 1.6–2.6)
MCHC RBC-ENTMCNC: 27 PG — SIGNIFICANT CHANGE UP (ref 27–34)
MCHC RBC-ENTMCNC: 32.5 G/DL — SIGNIFICANT CHANGE UP (ref 32–36)
MCV RBC AUTO: 83.1 FL — SIGNIFICANT CHANGE UP (ref 80–100)
NRBC BLD AUTO-RTO: 0 /100 WBCS — SIGNIFICANT CHANGE UP (ref 0–0)
PHOSPHATE SERPL-MCNC: 3.1 MG/DL — SIGNIFICANT CHANGE UP (ref 2.5–4.5)
PLATELET # BLD AUTO: 676 K/UL — HIGH (ref 150–400)
POTASSIUM SERPL-MCNC: 4.1 MMOL/L — SIGNIFICANT CHANGE UP (ref 3.5–5.3)
POTASSIUM SERPL-SCNC: 4.1 MMOL/L — SIGNIFICANT CHANGE UP (ref 3.5–5.3)
RBC # BLD: 4.33 M/UL — SIGNIFICANT CHANGE UP (ref 4.2–5.8)
RBC # FLD: 15.1 % — HIGH (ref 10.3–14.5)
SODIUM SERPL-SCNC: 138 MMOL/L — SIGNIFICANT CHANGE UP (ref 135–145)
WBC # BLD: 6.46 K/UL — SIGNIFICANT CHANGE UP (ref 3.8–10.5)
WBC # FLD AUTO: 6.46 K/UL — SIGNIFICANT CHANGE UP (ref 3.8–10.5)

## 2025-03-19 PROCEDURE — 99232 SBSQ HOSP IP/OBS MODERATE 35: CPT

## 2025-03-19 RX ORDER — INSULIN LISPRO 100 U/ML
8 INJECTION, SOLUTION INTRAVENOUS; SUBCUTANEOUS
Refills: 0 | Status: DISCONTINUED | OUTPATIENT
Start: 2025-03-19 | End: 2025-03-20

## 2025-03-19 RX ORDER — INSULIN LISPRO 100 U/ML
2 INJECTION, SOLUTION INTRAVENOUS; SUBCUTANEOUS AT BEDTIME
Refills: 0 | Status: DISCONTINUED | OUTPATIENT
Start: 2025-03-19 | End: 2025-03-20

## 2025-03-19 RX ADMIN — Medication 1 CAPSULE(S): at 09:11

## 2025-03-19 RX ADMIN — INSULIN LISPRO 2: 100 INJECTION, SOLUTION INTRAVENOUS; SUBCUTANEOUS at 09:10

## 2025-03-19 RX ADMIN — Medication 3 MILLIGRAM(S): at 22:20

## 2025-03-19 RX ADMIN — Medication 500 MILLIGRAM(S): at 17:47

## 2025-03-19 RX ADMIN — INSULIN LISPRO 1: 100 INJECTION, SOLUTION INTRAVENOUS; SUBCUTANEOUS at 12:48

## 2025-03-19 RX ADMIN — Medication 500 MILLIGRAM(S): at 05:03

## 2025-03-19 RX ADMIN — INSULIN GLARGINE-YFGN 26 UNIT(S): 100 INJECTION, SOLUTION SUBCUTANEOUS at 22:19

## 2025-03-19 RX ADMIN — INSULIN LISPRO 8 UNIT(S): 100 INJECTION, SOLUTION INTRAVENOUS; SUBCUTANEOUS at 12:48

## 2025-03-19 RX ADMIN — ENOXAPARIN SODIUM 40 MILLIGRAM(S): 100 INJECTION SUBCUTANEOUS at 13:08

## 2025-03-19 RX ADMIN — INSULIN LISPRO 8 UNIT(S): 100 INJECTION, SOLUTION INTRAVENOUS; SUBCUTANEOUS at 20:20

## 2025-03-19 RX ADMIN — INSULIN LISPRO 7 UNIT(S): 100 INJECTION, SOLUTION INTRAVENOUS; SUBCUTANEOUS at 09:10

## 2025-03-19 RX ADMIN — Medication 1 APPLICATION(S): at 07:47

## 2025-03-19 RX ADMIN — Medication 1 CAPSULE(S): at 20:27

## 2025-03-19 RX ADMIN — Medication 1 CAPSULE(S): at 12:48

## 2025-03-19 NOTE — PROGRESS NOTE ADULT - PROBLEM SELECTOR PLAN 1
Inpatient Plan:  - Check BG TID AC and HS while on PO diet   - C/w Lantus 26u QHS  - Adjust Admelog to 8u TID AC (HOLD if NPO or <50% of meal)  - Start Admelog 2u prn snack HS (HOLD if not having late night snack)  - C/w low dose Admelog correctional scales TID AC and HS  - Please keep all IV abx in NS solution if possible     Discharge Planning:   - Discharge on Lantus 26u QHS and Admelog 8u TID AC (hold admelog if skipping meal or pre-meal BG is <100mg/dL). Low c-peptide, needs insulin.   - Patient should check BG TID AC and HS at home. Can use his FSL2 or FSL3 CGM to monitor BGs but if BG <100mg/dL or >250mg/dL, patient should confirm with fingerstick BG. Please tell patient to contact Endocrinologist if BG <70mg/dL x1 or >200mg/dL consistently or >400mg/dL x1. Declines desire for FSL3 for now.   - Patient to follow up with endocrinologist Dr. Panda at [place] upon discharge.   - Recommend routine outpatient f/u with Opthalmology and Podiatry.   - Please write Rxs for: Solo Star Insulin pen/Humalog Kwik insulin pen ( can prescribe any insulin analog equivalent covered by pt's insurance)/ Linda insulin pen needles/glucose meter/strips/lancets. Glucose tablets/gel> use as directed plus Glucagon nasal/ IM injection (use as directed)>Can prescribe any covered by pt's insurance.

## 2025-03-19 NOTE — PROGRESS NOTE ADULT - SUBJECTIVE AND OBJECTIVE BOX
SURGERY DAILY PROGRESS NOTE    SUBJECTIVE: Patient seen and evaluated on AM rounds.     -----------------------------------------------------------------------------------------------------------------------------------------------------------------------------------------------------------  OBJECTIVE:  Vital Signs Last 24 Hrs  T(C): 37.1 (19 Mar 2025 04:17), Max: 37.1 (19 Mar 2025 04:17)  T(F): 98.8 (19 Mar 2025 04:17), Max: 98.8 (19 Mar 2025 04:17)  HR: 74 (19 Mar 2025 04:17) (74 - 85)  BP: 105/67 (19 Mar 2025 04:17) (96/61 - 109/74)  BP(mean): --  RR: 18 (19 Mar 2025 04:17) (18 - 18)  SpO2: 97% (19 Mar 2025 04:17) (97% - 99%)    Parameters below as of 19 Mar 2025 04:17  Patient On (Oxygen Delivery Method): room air      I&O's Detail    17 Mar 2025 07:01  -  18 Mar 2025 07:00  --------------------------------------------------------  IN:    IV PiggyBack: 50 mL    Oral Fluid: 610 mL  Total IN: 660 mL    OUT:    Voided (mL): 750 mL  Total OUT: 750 mL    Total NET: -90 mL      18 Mar 2025 07:01  -  19 Mar 2025 05:38  --------------------------------------------------------  IN:    Oral Fluid: 240 mL  Total IN: 240 mL    OUT:    Voided (mL): 1350 mL  Total OUT: 1350 mL    Total NET: -1110 mL        Daily     Daily   MEDICATIONS  (STANDING):  chlorhexidine 2% Cloths 1 Application(s) Topical <User Schedule>  Dakins Solution - 1/4 Strength 1 Application(s) Topical three times a day  enoxaparin Injectable 40 milliGRAM(s) SubCutaneous every 24 hours  insulin glargine Injectable (LANTUS) 26 Unit(s) SubCutaneous at bedtime  insulin lispro (ADMELOG) corrective regimen sliding scale   SubCutaneous at bedtime  insulin lispro (ADMELOG) corrective regimen sliding scale   SubCutaneous three times a day before meals  insulin lispro Injectable (ADMELOG) 7 Unit(s) SubCutaneous three times a day with meals  melatonin 3 milliGRAM(s) Oral at bedtime  metroNIDAZOLE    Tablet 500 milliGRAM(s) Oral every 12 hours  pancrelipase  (CREON 12,000 Lipase Units) 1 Capsule(s) Oral three times a day with meals  senna 2 Tablet(s) Oral at bedtime    MEDICATIONS  (PRN):  acetaminophen   IVPB .. 1000 milliGRAM(s) IV Intermittent every 6 hours PRN Mild Pain (1 - 3)  dextrose Oral Gel 15 Gram(s) Oral once PRN Blood Glucose LESS THAN 70 milliGRAM(s)/deciliter      LABS:                        11.9   6.26  )-----------( 677      ( 18 Mar 2025 08:16 )             36.7     03-18    136  |  103  |  20  ----------------------------<  169[H]  4.1   |  22  |  0.90    Ca    8.3[L]      18 Mar 2025 08:16  Phos  3.3     03-18  Mg     2.3     03-18        Urinalysis Basic - ( 18 Mar 2025 08:16 )    Color: x / Appearance: x / SG: x / pH: x  Gluc: 169 mg/dL / Ketone: x  / Bili: x / Urobili: x   Blood: x / Protein: x / Nitrite: x   Leuk Esterase: x / RBC: x / WBC x   Sq Epi: x / Non Sq Epi: x / Bacteria: x      PHYSICAL EXAM:  General: NAD, resting comfortably in bed  HEENT: Normocephalic atraumatic  Respiratory: Nonlabored respirations  : dressing c/d/i  Neuro: awake, alert and answering questions appropriately

## 2025-03-19 NOTE — PROGRESS NOTE ADULT - ASSESSMENT
59y Male with hx of T2DM, HTN, HLD, pancreatic adenocarcinoma s/p juan (2017), presents with scrotal and perineal swelling. 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 Endocrine was consulted for T2DM. Patient is feeling good, eating mostly full meals now and tolerating POs. Patient is s/p I&D and exploration done 10/9. Patient remains on IV abx. FBG slightly elevated 2/2 to patient eating snack last night -> will add prn snack time insulin. Noted persistent mild postprandial hyperglycemia -> will slightly increase mealtime insulin for tighter BG control. No hypoglycemia. Patient has FSL2 on but sensor disconnected this am and currently waiting for connection to download again -> located on L arm. States he has FSL3 at home. Endocrine will closely monitor BG and adjust insulin as needed for BG goal 100-180mg/dL inpatient.     # T2DM with hyperglycemia   - Most recent Hemoglobin A1C 9.3%  - At home, on lantus 26 units and lispro 10-12 units premeals  - Current FS ranges from low-mid 200 with improved appetite; uses FSL2 with reader  C-Peptide, Serum: 2.2 ng/mL (11.16.18 @ 12:10)  C-Peptide, Serum: 0.2 ng/mL (03.11.25 @ 13:04)

## 2025-03-19 NOTE — PROGRESS NOTE ADULT - SUBJECTIVE AND OBJECTIVE BOX
Patient seen today for follow up inpatient Diabetes Mellitus management.    Chief Complaint: Type 2 Diabetes Mellitus     INTERVAL HX:  Patient seen in [facility] 2MON 215 W1. Patient is alert and oriented, resting in bed. Patient reports feeling good, eating full meals and tolerating POs. FBG elevated this am to 227mg/dL. No hypoglycemia. Patient reports he ate crackers last night. Denies any OSH food. Noted mild persistent postprandial hyperglycemia to low 200s. Blood glucose levels in the last 24hrs have been 152-227mg/dL.     Review of Systems:  General: As above.  Respiratory: Denies any SOB, MAKI, or cough.  Gastrointestinal: Denies any n/v/d or abdominal pain.   Endocrine: Denies any polyuria, polydipsia, polyphagia, visual changes, or numbness in feet.     Allergies  No Known Allergies      Intolerances  None.       MEDICATIONS  (STANDING):  acetaminophen   IVPB .. 1000 milliGRAM(s) IV Intermittent every 6 hours PRN  chlorhexidine 2% Cloths 1 Application(s) Topical <User Schedule>  dextrose Oral Gel 15 Gram(s) Oral once PRN  enoxaparin Injectable 40 milliGRAM(s) SubCutaneous every 24 hours  insulin glargine Injectable (LANTUS) 26 Unit(s) SubCutaneous at bedtime  insulin lispro (ADMELOG) corrective regimen sliding scale   SubCutaneous at bedtime  insulin lispro (ADMELOG) corrective regimen sliding scale   SubCutaneous three times a day before meals  insulin lispro Injectable (ADMELOG) 2 Unit(s) SubCutaneous at bedtime  insulin lispro Injectable (ADMELOG) 8 Unit(s) SubCutaneous three times a day before meals  melatonin 3 milliGRAM(s) Oral at bedtime  metroNIDAZOLE    Tablet 500 milliGRAM(s) Oral every 12 hours  pancrelipase  (CREON 12,000 Lipase Units) 1 Capsule(s) Oral three times a day with meals  senna 2 Tablet(s) Oral at bedtime      dextrose Oral Gel 15 Gram(s) Oral once PRN  insulin glargine Injectable (LANTUS) 26 Unit(s) SubCutaneous at bedtime  insulin lispro (ADMELOG) corrective regimen sliding scale   SubCutaneous at bedtime  insulin lispro (ADMELOG) corrective regimen sliding scale   SubCutaneous three times a day before meals  insulin lispro Injectable (ADMELOG) 2 Unit(s) SubCutaneous at bedtime  insulin lispro Injectable (ADMELOG) 8 Unit(s) SubCutaneous three times a day before meals      insulin lispro (ADMELOG) corrective regimen sliding scale   SubCutaneous at bedtime  insulin lispro (ADMELOG) corrective regimen sliding scale   SubCutaneous three times a day before meals  insulin lispro Injectable (ADMELOG) 2 Unit(s) SubCutaneous at bedtime  insulin lispro Injectable (ADMELOG) 8 Unit(s) SubCutaneous three times a day before meals      PHYSICAL EXAM:  VITALS:   T(C): 36.3 (03-19-25 @ 08:32), Max: 37.1 (03-19-25 @ 04:17)  HR: 91 (03-19-25 @ 08:32) (74 - 91)  BP: 110/74 (03-19-25 @ 08:32) (96/61 - 110/74)  RR: 18 (03-19-25 @ 08:32) (18 - 18)  SpO2: 99% (03-19-25 @ 08:32) (97% - 99%)    GENERAL: In no acute distress  Respiratory: Respirations unlabored  Extremities: Warm and dry, no edema  NEURO: Alert and oriented, appropriate     LABS:  POCT Blood Glucose.: 227 mg/dL (03-19-25 @ 09:09)  POCT Blood Glucose.: 209 mg/dL (03-18-25 @ 21:32)  POCT Blood Glucose.: 152 mg/dL (03-18-25 @ 17:33)  POCT Blood Glucose.: 214 mg/dL (03-18-25 @ 12:15)  POCT Blood Glucose.: 145 mg/dL (03-18-25 @ 09:06)  POCT Blood Glucose.: 156 mg/dL (03-18-25 @ 07:33)  POCT Blood Glucose.: 145 mg/dL (03-17-25 @ 21:06)  POCT Blood Glucose.: 88 mg/dL (03-17-25 @ 18:25)  POCT Blood Glucose.: 109 mg/dL (03-17-25 @ 14:44)  POCT Blood Glucose.: 221 mg/dL (03-17-25 @ 09:44)  POCT Blood Glucose.: 164 mg/dL (03-16-25 @ 21:50)  POCT Blood Glucose.: 185 mg/dL (03-16-25 @ 17:41)  POCT Blood Glucose.: 179 mg/dL (03-16-25 @ 13:16)                          11.7   6.46  )-----------( 676      ( 19 Mar 2025 07:03 )             36.0     03-19    138  |  104  |  25[H]  ----------------------------<  254[H]  4.1   |  22  |  0.93    Ca    8.7      19 Mar 2025 07:03  Phos  3.1     03-19  Mg     2.3     03-19        Urinalysis Basic - ( 19 Mar 2025 07:03 )    Color: x / Appearance: x / SG: x / pH: x  Gluc: 254 mg/dL / Ketone: x  / Bili: x / Urobili: x   Blood: x / Protein: x / Nitrite: x   Leuk Esterase: x / RBC: x / WBC x   Sq Epi: x / Non Sq Epi: x / Bacteria: x         A1C with Estimated Average Glucose Result: A1C with Estimated Average Glucose Result: 9.3 % (03-08-25 @ 05:35)

## 2025-03-19 NOTE — PROGRESS NOTE ADULT - ASSESSMENT
59M PMH DM, pancreatic cancer, s/p whipple in [facility] presents with scrotal and perineal swelling c/f Fornier's gangrene. The patient is now s/p I&D of Perineum and Groin, and Scrotal Exploration with ACS/Trauma and Urology now s/p Irrigation and debridement of perineum and groin.     Recommendations  - Wet to dry dressing changes  - Wound care recs appreciated - pt will f/u in wound care center upon d/c  - ID recs appreciated - continue with Flagyl and IV rocephin   - Endo following, appreciate recs   - DVT ppx: Lvx  - Diet: CC  - pain control PRN   - Dispo planning ongoing - home vs [facility]    Acute care surgery, pager#[phone]  59M PMH DM, pancreatic cancer, s/p whipple in [facility] presents with scrotal and perineal swelling c/f Fornier's gangrene. The patient is now s/p I&D of Perineum and Groin, and Scrotal Exploration with ACS/Trauma and Urology now s/p Irrigation and debridement of perineum and groin.     Recommendations  - Wet to dry dressing changes  - Wound care recs appreciated - pt will f/u in wound care center upon d/c  - ID recs appreciated - continue with Flagyl until 3/28  - Endo following, appreciate recs   - DVT ppx: Lvx  - Diet: CC  - pain control PRN   - Dispo planning ongoing - home vs [facility]    Acute care surgery, pager#[phone]

## 2025-03-20 ENCOUNTER — TRANSCRIPTION ENCOUNTER (OUTPATIENT)
Age: 60
End: 2025-03-20

## 2025-03-20 VITALS
OXYGEN SATURATION: 98 % | RESPIRATION RATE: 18 BRPM | HEART RATE: 88 BPM | SYSTOLIC BLOOD PRESSURE: 114 MMHG | TEMPERATURE: 98 F | DIASTOLIC BLOOD PRESSURE: 89 MMHG

## 2025-03-20 LAB
ANION GAP SERPL CALC-SCNC: 12 MMOL/L — SIGNIFICANT CHANGE UP (ref 5–17)
BUN SERPL-MCNC: 21 MG/DL — SIGNIFICANT CHANGE UP (ref 7–23)
CHLORIDE SERPL-SCNC: 104 MMOL/L — SIGNIFICANT CHANGE UP (ref 96–108)
CO2 SERPL-SCNC: 21 MMOL/L — LOW (ref 22–31)
CREAT SERPL-MCNC: 0.75 MG/DL — SIGNIFICANT CHANGE UP (ref 0.5–1.3)
EGFR: 105 ML/MIN/1.73M2 — SIGNIFICANT CHANGE UP
EGFR: 105 ML/MIN/1.73M2 — SIGNIFICANT CHANGE UP
GLUCOSE BLDC GLUCOMTR-MCNC: 117 MG/DL — HIGH (ref 70–99)
GLUCOSE BLDC GLUCOMTR-MCNC: 182 MG/DL — HIGH (ref 70–99)
HCT VFR BLD CALC: 38.6 % — LOW (ref 39–50)
HGB BLD-MCNC: 12.6 G/DL — LOW (ref 13–17)
MAGNESIUM SERPL-MCNC: 2.3 MG/DL — SIGNIFICANT CHANGE UP (ref 1.6–2.6)
MCHC RBC-ENTMCNC: 26.9 PG — LOW (ref 27–34)
MCHC RBC-ENTMCNC: 32.6 G/DL — SIGNIFICANT CHANGE UP (ref 32–36)
MCV RBC AUTO: 82.3 FL — SIGNIFICANT CHANGE UP (ref 80–100)
NRBC BLD AUTO-RTO: 0 /100 WBCS — SIGNIFICANT CHANGE UP (ref 0–0)
PHOSPHATE SERPL-MCNC: 3.6 MG/DL — SIGNIFICANT CHANGE UP (ref 2.5–4.5)
POTASSIUM SERPL-MCNC: 3.8 MMOL/L — SIGNIFICANT CHANGE UP (ref 3.5–5.3)
POTASSIUM SERPL-SCNC: 3.8 MMOL/L — SIGNIFICANT CHANGE UP (ref 3.5–5.3)
RBC # BLD: 4.69 M/UL — SIGNIFICANT CHANGE UP (ref 4.2–5.8)
RBC # FLD: 15.3 % — HIGH (ref 10.3–14.5)
SODIUM SERPL-SCNC: 137 MMOL/L — SIGNIFICANT CHANGE UP (ref 135–145)
WBC # BLD: 7.59 K/UL — SIGNIFICANT CHANGE UP (ref 3.8–10.5)
WBC # FLD AUTO: 7.59 K/UL — SIGNIFICANT CHANGE UP (ref 3.8–10.5)

## 2025-03-20 PROCEDURE — 97161 PT EVAL LOW COMPLEX 20 MIN: CPT

## 2025-03-20 PROCEDURE — 80202 ASSAY OF VANCOMYCIN: CPT

## 2025-03-20 PROCEDURE — 97116 GAIT TRAINING THERAPY: CPT

## 2025-03-20 PROCEDURE — 99292 CRITICAL CARE ADDL 30 MIN: CPT

## 2025-03-20 PROCEDURE — 87186 SC STD MICRODIL/AGAR DIL: CPT

## 2025-03-20 PROCEDURE — 82803 BLOOD GASES ANY COMBINATION: CPT

## 2025-03-20 PROCEDURE — 85018 HEMOGLOBIN: CPT

## 2025-03-20 PROCEDURE — 84100 ASSAY OF PHOSPHORUS: CPT

## 2025-03-20 PROCEDURE — 82962 GLUCOSE BLOOD TEST: CPT

## 2025-03-20 PROCEDURE — 87641 MR-STAPH DNA AMP PROBE: CPT

## 2025-03-20 PROCEDURE — 36415 COLL VENOUS BLD VENIPUNCTURE: CPT

## 2025-03-20 PROCEDURE — 85014 HEMATOCRIT: CPT

## 2025-03-20 PROCEDURE — 82947 ASSAY GLUCOSE BLOOD QUANT: CPT

## 2025-03-20 PROCEDURE — 83605 ASSAY OF LACTIC ACID: CPT

## 2025-03-20 PROCEDURE — 85610 PROTHROMBIN TIME: CPT

## 2025-03-20 PROCEDURE — 84132 ASSAY OF SERUM POTASSIUM: CPT

## 2025-03-20 PROCEDURE — 96374 THER/PROPH/DIAG INJ IV PUSH: CPT

## 2025-03-20 PROCEDURE — 80076 HEPATIC FUNCTION PANEL: CPT

## 2025-03-20 PROCEDURE — 84145 PROCALCITONIN (PCT): CPT

## 2025-03-20 PROCEDURE — 96375 TX/PRO/DX INJ NEW DRUG ADDON: CPT

## 2025-03-20 PROCEDURE — G0545: CPT

## 2025-03-20 PROCEDURE — C9399: CPT

## 2025-03-20 PROCEDURE — 36569 INSJ PICC 5 YR+ W/O IMAGING: CPT

## 2025-03-20 PROCEDURE — 99232 SBSQ HOSP IP/OBS MODERATE 35: CPT

## 2025-03-20 PROCEDURE — 74177 CT ABD & PELVIS W/CONTRAST: CPT | Mod: MC

## 2025-03-20 PROCEDURE — 80053 COMPREHEN METABOLIC PANEL: CPT

## 2025-03-20 PROCEDURE — 71045 X-RAY EXAM CHEST 1 VIEW: CPT

## 2025-03-20 PROCEDURE — 86901 BLOOD TYPING SEROLOGIC RH(D): CPT

## 2025-03-20 PROCEDURE — 82330 ASSAY OF CALCIUM: CPT

## 2025-03-20 PROCEDURE — 85027 COMPLETE CBC AUTOMATED: CPT

## 2025-03-20 PROCEDURE — 84681 ASSAY OF C-PEPTIDE: CPT

## 2025-03-20 PROCEDURE — 97110 THERAPEUTIC EXERCISES: CPT

## 2025-03-20 PROCEDURE — 80048 BASIC METABOLIC PNL TOTAL CA: CPT

## 2025-03-20 PROCEDURE — 87077 CULTURE AEROBIC IDENTIFY: CPT

## 2025-03-20 PROCEDURE — 84295 ASSAY OF SERUM SODIUM: CPT

## 2025-03-20 PROCEDURE — 93005 ELECTROCARDIOGRAM TRACING: CPT

## 2025-03-20 PROCEDURE — 83735 ASSAY OF MAGNESIUM: CPT

## 2025-03-20 PROCEDURE — 87640 STAPH A DNA AMP PROBE: CPT

## 2025-03-20 PROCEDURE — 86900 BLOOD TYPING SEROLOGIC ABO: CPT

## 2025-03-20 PROCEDURE — 81001 URINALYSIS AUTO W/SCOPE: CPT

## 2025-03-20 PROCEDURE — 85025 COMPLETE CBC W/AUTO DIFF WBC: CPT

## 2025-03-20 PROCEDURE — 87070 CULTURE OTHR SPECIMN AEROBIC: CPT

## 2025-03-20 PROCEDURE — 85730 THROMBOPLASTIN TIME PARTIAL: CPT

## 2025-03-20 PROCEDURE — 87075 CULTR BACTERIA EXCEPT BLOOD: CPT

## 2025-03-20 PROCEDURE — 87086 URINE CULTURE/COLONY COUNT: CPT

## 2025-03-20 PROCEDURE — 86850 RBC ANTIBODY SCREEN: CPT

## 2025-03-20 PROCEDURE — 82435 ASSAY OF BLOOD CHLORIDE: CPT

## 2025-03-20 PROCEDURE — C1751: CPT

## 2025-03-20 PROCEDURE — 99291 CRITICAL CARE FIRST HOUR: CPT

## 2025-03-20 PROCEDURE — 83036 HEMOGLOBIN GLYCOSYLATED A1C: CPT

## 2025-03-20 RX ORDER — CEFTRIAXONE 500 MG/1
1000 INJECTION, POWDER, FOR SOLUTION INTRAMUSCULAR; INTRAVENOUS ONCE
Refills: 0 | Status: DISCONTINUED | OUTPATIENT
Start: 2025-03-20 | End: 2025-03-20

## 2025-03-20 RX ORDER — CEFTRIAXONE 500 MG/1
1000 INJECTION, POWDER, FOR SOLUTION INTRAMUSCULAR; INTRAVENOUS EVERY 24 HOURS
Refills: 0 | Status: DISCONTINUED | OUTPATIENT
Start: 2025-03-20 | End: 2025-03-20

## 2025-03-20 RX ORDER — CEFTRIAXONE 500 MG/1
1 INJECTION, POWDER, FOR SOLUTION INTRAMUSCULAR; INTRAVENOUS
Qty: 0 | Refills: 0 | DISCHARGE
Start: 2025-03-20

## 2025-03-20 RX ORDER — METRONIDAZOLE 250 MG
1 TABLET ORAL
Qty: 0 | Refills: 0 | DISCHARGE
Start: 2025-03-20

## 2025-03-20 RX ORDER — ACETAMINOPHEN 500 MG/5ML
2 LIQUID (ML) ORAL
Qty: 0 | Refills: 0 | DISCHARGE

## 2025-03-20 RX ORDER — INSULIN LISPRO 100 U/ML
8 INJECTION, SOLUTION INTRAVENOUS; SUBCUTANEOUS
Qty: 0 | Refills: 0 | DISCHARGE
Start: 2025-03-20

## 2025-03-20 RX ORDER — SENNA 187 MG
2 TABLET ORAL
Qty: 0 | Refills: 0 | DISCHARGE
Start: 2025-03-20

## 2025-03-20 RX ORDER — CEFTRIAXONE 500 MG/1
INJECTION, POWDER, FOR SOLUTION INTRAMUSCULAR; INTRAVENOUS
Refills: 0 | Status: DISCONTINUED | OUTPATIENT
Start: 2025-03-20 | End: 2025-03-20

## 2025-03-20 RX ORDER — MELATONIN 5 MG
1 TABLET ORAL
Qty: 0 | Refills: 0 | DISCHARGE
Start: 2025-03-20

## 2025-03-20 RX ADMIN — ENOXAPARIN SODIUM 40 MILLIGRAM(S): 100 INJECTION SUBCUTANEOUS at 13:16

## 2025-03-20 RX ADMIN — Medication 500 MILLIGRAM(S): at 05:33

## 2025-03-20 RX ADMIN — INSULIN LISPRO 8 UNIT(S): 100 INJECTION, SOLUTION INTRAVENOUS; SUBCUTANEOUS at 13:17

## 2025-03-20 RX ADMIN — Medication 1 APPLICATION(S): at 07:58

## 2025-03-20 RX ADMIN — Medication 1 CAPSULE(S): at 09:14

## 2025-03-20 RX ADMIN — INSULIN LISPRO 8 UNIT(S): 100 INJECTION, SOLUTION INTRAVENOUS; SUBCUTANEOUS at 09:15

## 2025-03-20 RX ADMIN — INSULIN LISPRO 1: 100 INJECTION, SOLUTION INTRAVENOUS; SUBCUTANEOUS at 13:17

## 2025-03-20 RX ADMIN — CEFTRIAXONE 1000 MILLIGRAM(S): 500 INJECTION, POWDER, FOR SOLUTION INTRAMUSCULAR; INTRAVENOUS at 09:14

## 2025-03-20 RX ADMIN — Medication 1 CAPSULE(S): at 13:16

## 2025-03-20 NOTE — PROGRESS NOTE ADULT - ASSESSMENT
66 year old s/p Whipple's for pancreatic cancer .    Recent treated  for perirectal abscess admitted with selling, gas and pain in scrotum. Underwent debridement several items   cultures \  polymicrobial with E-COLI Prevotella Edwardsiella   current wd pack and doing well  non toxic    Resolving necrotizing scrotal infection Unclear if related to pancreatic cancer   will need several weeks of ab    changed  to ceftriaxone and metronidazole for easy of administration   wd improved  packed   pt lives by himself and will need wound  and IV antibiotics  will likely go to EM  discussed with team    Would plan  to continue antibiotics until 3/28  ceftriaxone to be restarted

## 2025-03-20 NOTE — PROGRESS NOTE ADULT - PROBLEM SELECTOR PROBLEM 1
Type 2 diabetes mellitus treated with insulin
Pema gangrene
Type 2 diabetes mellitus treated with insulin

## 2025-03-20 NOTE — PROGRESS NOTE ADULT - ASSESSMENT
59y Male with hx of T2DM, HTN, HLD, pancreatic adenocarcinoma s/p juan (2017), presents with scrotal and perineal swelling. 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 Endocrine was consulted for T2DM. Patient is s/p I&D and exploration done 10/9. Patient is feeling good, eating full meals and tolerating POs. FBG stable, no hypoglycemia. BGs have been stable and at goal 100-180mg/dL. Patient pending d/c to rehab today per primary team.       # T2DM with hyperglycemia   - Most recent Hemoglobin A1C 9.3%  - At home, on lantus 26 units and lispro 10-12 units premeals  - Current FS ranges from low-mid 200 with improved appetite; uses FSL2 with reader  C-Peptide, Serum: 2.2 ng/mL (11.16.18 @ 12:10)  C-Peptide, Serum: 0.2 ng/mL (03.11.25 @ 13:04)

## 2025-03-20 NOTE — PROGRESS NOTE ADULT - PROBLEM SELECTOR PROBLEM 3
HLD (hyperlipidemia)
H/O pancreatic cancer
HLD (hyperlipidemia)

## 2025-03-20 NOTE — PROGRESS NOTE ADULT - SUBJECTIVE AND OBJECTIVE BOX
infectious diseases progress note:    Patient is a 59y old  Male who presents with a chief complaint of Scrotal/perineal swelling/infection (19 Mar 2025 12:22)        Pema's gangrene          ROS:     Allergies    No Known Allergies    Intolerances        ANTIBIOTICS/RELEVANT:  antimicrobials  metroNIDAZOLE    Tablet 500 milliGRAM(s) Oral every 12 hours    immunologic:    OTHER:  acetaminophen   IVPB .. 1000 milliGRAM(s) IV Intermittent every 6 hours PRN  chlorhexidine 2% Cloths 1 Application(s) Topical <User Schedule>  dextrose Oral Gel 15 Gram(s) Oral once PRN  enoxaparin Injectable 40 milliGRAM(s) SubCutaneous every 24 hours  insulin glargine Injectable (LANTUS) 26 Unit(s) SubCutaneous at bedtime  insulin lispro (ADMELOG) corrective regimen sliding scale   SubCutaneous at bedtime  insulin lispro (ADMELOG) corrective regimen sliding scale   SubCutaneous three times a day before meals  insulin lispro Injectable (ADMELOG) 2 Unit(s) SubCutaneous at bedtime  insulin lispro Injectable (ADMELOG) 8 Unit(s) SubCutaneous three times a day before meals  melatonin 3 milliGRAM(s) Oral at bedtime  pancrelipase  (CREON 12,000 Lipase Units) 1 Capsule(s) Oral three times a day with meals  senna 2 Tablet(s) Oral at bedtime      Objective:  Vital Signs Last 24 Hrs  T(C): 36.9 (20 Mar 2025 04:53), Max: 36.9 (19 Mar 2025 12:14)  T(F): 98.4 (20 Mar 2025 04:53), Max: 98.5 (19 Mar 2025 21:24)  HR: 87 (20 Mar 2025 04:53) (79 - 92)  BP: 114/69 (20 Mar 2025 04:53) (102/68 - 121/85)  BP(mean): --  RR: 18 (20 Mar 2025 04:53) (18 - 18)  SpO2: 98% (20 Mar 2025 04:53) (98% - 99%)    Parameters below as of 20 Mar 2025 04:53  Patient On (Oxygen Delivery Method): room air               LABS:                        12.6   7.59  )-----------( 720      ( 20 Mar 2025 06:43 )             38.6     03-20    137  |  104  |  21  ----------------------------<  137[H]  3.8   |  21[L]  |  0.75    Ca    8.8      20 Mar 2025 06:43  Phos  3.6     03-20  Mg     2.3     03-20        Urinalysis Basic - ( 20 Mar 2025 06:43 )    Color: x / Appearance: x / SG: x / pH: x  Gluc: 137 mg/dL / Ketone: x  / Bili: x / Urobili: x   Blood: x / Protein: x / Nitrite: x   Leuk Esterase: x / RBC: x / WBC x   Sq Epi: x / Non Sq Epi: x / Bacteria: x          MICROBIOLOGY:    RECENT CULTURES:        RESPIRATORY CULTURES:              RADIOLOGY & ADDITIONAL STUDIES:        Pager [phone]  After 5 pm/weekends or if no response :[phone]

## 2025-03-20 NOTE — PROGRESS NOTE ADULT - PROBLEM SELECTOR PLAN 3
- Continue with atorvastatin QHS  - Goal LDL<70  - Manage as outpatient
- Continue with atorvastatin QHS  - Goal LDL<70  - Manage as outpatient    Assessed pt/labs/meds and discussed plan of care with primary team/pt  Insulin adjustment   Discharge plan  Follow up care    Contact via [employer] Teams during business hours  To reach covering provider access AMION via sunrise tools  For Urgent matters/after-hours/weekends/holidays please page endocrine fellow on call   For nonurgent matters please email [email or web address]    Please note that this patient may be followed by different provider tomorrow.  Notify endocrine 24 hours prior to discharge for final recommendations
- Continue with atorvastatin QHS  - Goal LDL<70  - Manage as outpatient
h/o Deandre   - c/w creon (pt takes 24k units 2 tabs TID).
Continue with atorvastatin QHS  - Goal LDL<70  - Manage as outpatient    discussed w/pt and RN  Any inquiries please email [email or web address]   office:  [phone] (M-F 9a-5pm)               [phone] (nights/weekends)   Can access Amion coverage via sunrise/tools
Continue with atorvastatin QHS  - Goal LDL<70  - Manage as outpatient
- Continue with atorvastatin QHS  - Goal LDL<70  - Manage as outpatient
- Continue with atorvastatin QHS  - Goal LDL<70  - Manage as outpatient

## 2025-03-20 NOTE — PROGRESS NOTE ADULT - PROBLEM SELECTOR PLAN 1
Inpatient Plan:  - Check BG TID AC and HS while on PO diet   - C/w Lantus 26u QHS  - C/w Admelog 8u TID AC (HOLD if NPO or <50% of meal)  - C/w Admelog 2u prn snack HS (HOLD if not having late night snack)  - C/w low dose Admelog correctional scales TID AC and HS  - Please keep all IV abx in NS solution if possible     Discharge Planning:   - Discharge on Lantus 26u QHS and Admelog 8u TID AC (hold admelog if skipping meal or pre-meal BG is <100mg/dL). Low c-peptide, needs insulin.   - Patient should check BG TID AC and HS at home. Can use his FSL2 or FSL3 CGM to monitor BGs but if BG <100mg/dL or >250mg/dL, patient should confirm with fingerstick BG. Please tell patient to contact Endocrinologist if BG <70mg/dL x1 or >200mg/dL consistently or >400mg/dL x1. Declines desire for FSL3 for now.   - Patient to follow up with endocrinologist Dr. Panda at [place] upon discharge.   - Recommend routine outpatient f/u with Opthalmology and Podiatry.   - Please write Rxs for: Solo Star Insulin pen/Humalog Kwik insulin pen ( can prescribe any insulin analog equivalent covered by pt's insurance)/ Linda insulin pen needles/glucose meter/strips/lancets. Glucose tablets/gel> use as directed plus Glucagon nasal/ IM injection (use as directed)>Can prescribe any covered by pt's insurance.

## 2025-03-20 NOTE — DISCHARGE NOTE NURSING/CASE MANAGEMENT/SOCIAL WORK - PATIENT PORTAL LINK FT
You can access the FollowMyHealth Patient Portal offered by [facility] by registering at the following website: [email or web address]. By joining [facility]’s FollowMyHealth portal, you will also be able to view your health information using other applications (apps) compatible with our system.

## 2025-03-20 NOTE — PROGRESS NOTE ADULT - PROBLEM SELECTOR PLAN 2
- Goal BP <130/80  - Management as per primary team  - check urine microalbumin level as outpatient
Inpatient Plan:  - Check BG TID AC and HS while on PO diet   - C/w Lantus 22u QHS  - Adjust Admelog to 7u TID AC (HOLD if NPO or <50% of meal)  - C/w low dose Admelog correctional scales TID AC and HS  - Please keep all IV abx in NS solution if possible     Discharge Planning:   - Patient to continue with basal/bolus insulin regimen. Dosage TBD closer to d/c based on inpatient glycemic requirement. Low c-peptide, needs insulin.   - Patient should check BG TID AC and HS at home. Can use his FSL2 CGM to monitor BGs but if BG <100mg/dL or >250mg/dL, patient should confirm with fingerstick BG. Please tell patient to contact Endocrinologist if BG <70mg/dL x1 or >200mg/dL consistently or >400mg/dL x1. Declines desire for FSL3 for now.   - Patient to follow up with endocrinologist Dr. Panda at [place].
- Goal BP <130/80  - Management as per primary team  - check urine microalbumin level as outpatient
Goal BP in /80  check microalbumin as outpatient   manage outpatient
- Goal BP <130/80  - Management as per primary team  - check urine microalbumin level as outpatient
a1c 9.3  pt takes long acting 26units and 10-12units pre-meal TID  - endocrine following - titration per endocrine   - currently on lantus 22units  - c/w 4units admelog pre-meal for now  - s/w SSI   - resume home lipitor 20mg.

## 2025-03-20 NOTE — PROGRESS NOTE ADULT - PROBLEM SELECTOR PROBLEM 2
HTN (hypertension)
Type 2 diabetes mellitus treated with insulin
HTN (hypertension)

## 2025-03-20 NOTE — DISCHARGE NOTE NURSING/CASE MANAGEMENT/SOCIAL WORK - NSDCFUADDAPPT_GEN_ALL_CORE_FT
Please call [phone] to schedule an appointment for follow up with Dr. Wallis or any of her associates within 1-2 weeks after discharge     Please call [phone] to schedule a follow up appointment with the Wound Center at [address] [place] [address] within 1-2 weeks following discharge

## 2025-03-20 NOTE — PROGRESS NOTE ADULT - PROVIDER SPECIALTY LIST ADULT
Surgery
Trauma Surgery
Trauma Surgery
Urology
Endocrinology
SICU
Surgery
Trauma Surgery
Endocrinology
Hospitalist
Infectious Disease
Surgery
Urology
Infectious Disease
Infectious Disease
Endocrinology

## 2025-03-20 NOTE — PROGRESS NOTE ADULT - SUBJECTIVE AND OBJECTIVE BOX
SURGERY DAILY PROGRESS NOTE    SUBJECTIVE: Patient seen and evaluated on AM rounds.     -----------------------------------------------------------------------------------------------------------------------------------------------------------------------------------------------------------  OBJECTIVE:  Vital Signs Last 24 Hrs  T(C): 36.9 (20 Mar 2025 04:53), Max: 36.9 (19 Mar 2025 12:14)  T(F): 98.4 (20 Mar 2025 04:53), Max: 98.5 (19 Mar 2025 21:24)  HR: 87 (20 Mar 2025 04:53) (79 - 92)  BP: 114/69 (20 Mar 2025 04:53) (102/68 - 121/85)  BP(mean): --  RR: 18 (20 Mar 2025 04:53) (18 - 18)  SpO2: 98% (20 Mar 2025 04:53) (98% - 99%)    Parameters below as of 20 Mar 2025 04:53  Patient On (Oxygen Delivery Method): room air      I&O's Detail    18 Mar 2025 07:01  -  19 Mar 2025 07:00  --------------------------------------------------------  IN:    Oral Fluid: 240 mL  Total IN: 240 mL    OUT:    Voided (mL): 1600 mL  Total OUT: 1600 mL    Total NET: -1360 mL      19 Mar 2025 07:01  -  20 Mar 2025 05:31  --------------------------------------------------------  IN:    Oral Fluid: 600 mL  Total IN: 600 mL    OUT:    Voided (mL): 2050 mL  Total OUT: 2050 mL    Total NET: -1450 mL        Daily     Daily   MEDICATIONS  (STANDING):  chlorhexidine 2% Cloths 1 Application(s) Topical <User Schedule>  enoxaparin Injectable 40 milliGRAM(s) SubCutaneous every 24 hours  insulin glargine Injectable (LANTUS) 26 Unit(s) SubCutaneous at bedtime  insulin lispro (ADMELOG) corrective regimen sliding scale   SubCutaneous at bedtime  insulin lispro (ADMELOG) corrective regimen sliding scale   SubCutaneous three times a day before meals  insulin lispro Injectable (ADMELOG) 2 Unit(s) SubCutaneous at bedtime  insulin lispro Injectable (ADMELOG) 8 Unit(s) SubCutaneous three times a day before meals  melatonin 3 milliGRAM(s) Oral at bedtime  metroNIDAZOLE    Tablet 500 milliGRAM(s) Oral every 12 hours  pancrelipase  (CREON 12,000 Lipase Units) 1 Capsule(s) Oral three times a day with meals  senna 2 Tablet(s) Oral at bedtime    MEDICATIONS  (PRN):  acetaminophen   IVPB .. 1000 milliGRAM(s) IV Intermittent every 6 hours PRN Mild Pain (1 - 3)  dextrose Oral Gel 15 Gram(s) Oral once PRN Blood Glucose LESS THAN 70 milliGRAM(s)/deciliter      LABS:                        11.7   6.46  )-----------( 676      ( 19 Mar 2025 07:03 )             36.0     03-19    138  |  104  |  25[H]  ----------------------------<  254[H]  4.1   |  22  |  0.93    Ca    8.7      19 Mar 2025 07:03  Phos  3.1     03-19  Mg     2.3     03-19        Urinalysis Basic - ( 19 Mar 2025 07:03 )    Color: x / Appearance: x / SG: x / pH: x  Gluc: 254 mg/dL / Ketone: x  / Bili: x / Urobili: x   Blood: x / Protein: x / Nitrite: x   Leuk Esterase: x / RBC: x / WBC x   Sq Epi: x / Non Sq Epi: x / Bacteria: x        PHYSICAL EXAM:  General: NAD, resting comfortably in bed  HEENT: Normocephalic atraumatic  Respiratory: Nonlabored respirations  : dressing c/d/i  Neuro: awake, alert and answering questions appropriately

## 2025-03-20 NOTE — PROGRESS NOTE ADULT - REASON FOR ADMISSION
pain
nec fas
wd
Scrotal/perineal swelling/infection
fourniere's gangrene
Scrotal/perineal swelling/infection

## 2025-03-20 NOTE — PROGRESS NOTE ADULT - NSPROGADDITIONALINFOA_GEN_ALL_CORE
Contact via Microsoft Teams during business hours  To reach covering provider access AMION via sunrise tools  For Urgent matters/after-hours/weekends/holidays please page endocrine fellow on call   For nonurgent matters please email [email or web address]    Please note that this patient may be followed by different provider tomorrow.  Notify endocrine 24 hours prior to discharge for final recommendations
Contact via Microsoft Teams during business hours  To reach covering provider access AMION via sunrise tools  For Urgent matters/after-hours/weekends/holidays please page endocrine fellow on call   For nonurgent matters please email [email or web address]    Please note that this patient may be followed by different provider tomorrow.  Notify endocrine 24 hours prior to discharge for final recommendations
Contact via Microsoft Teams during business hours  To reach covering provider access AMION via sunrise tools  For Urgent matters/after-hours/weekends/holidays please page endocrine fellow on call   For nonurgent matters please email [email or web address]    Please note that this patient may be followed by different provider tomorrow.  Notify endocrine 24 hours prior to discharge for final recommendations
Contact via Microsoft Teams during business hours  To reach covering provider access AMION via sunrise tools  For Urgent matters/after-hours/weekends/holidays please page endocrine fellow on call   For nonurgent matters please email [email or web address]    Please note that this patient may be followed by different provider tomorrow.  Notify endocrine 24 hours prior to discharge for final recommendations
Contact via Microsoft Teams during business hours  To reach covering provider access AMION via sunrise tools  For Urgent matters/after-hours/weekends/holidays please page endocrine fellow on call   For nonurgent matters please email [email or web address]    Please note that this patient may be followed by different provider tomorrow.  Notify endocrine 24 hours prior to discharge for final recommendations

## 2025-03-20 NOTE — DISCHARGE NOTE NURSING/CASE MANAGEMENT/SOCIAL WORK - FINANCIAL ASSISTANCE
[facility] provides services at a reduced cost to those who are determined to be eligible through [facility]’s financial assistance program. Information regarding [facility]’s financial assistance program can be found by going to [email or web address] or by calling [phone].

## 2025-03-20 NOTE — DISCHARGE NOTE NURSING/CASE MANAGEMENT/SOCIAL WORK - NSDCVIVACCINE_GEN_ALL_CORE_FT
Tdap; 15-Jul-2021 02:40; Umberto Tipton (RN); [employer]; [id number] (Exp. Date: 18-Nov-2022); IntraMuscular; Deltoid Right.; 0.5 milliLiter(s); VIS (VIS Published: 09-May-2013, VIS Presented: 15-Jul-2021);

## 2025-03-20 NOTE — PROGRESS NOTE ADULT - SUBJECTIVE AND OBJECTIVE BOX
Patient seen today for follow up inpatient Diabetes Mellitus management.    Chief Complaint: Type 2 Diabetes Mellitus     INTERVAL HX:  Patient seen in [facility] 2MON 215 W1. Patient is alert and oriented, resting in bed. Patient is feeling good, eating full meals and tolerating POs. FBG stable this am to 117mg/dL, 137mg/dL on blood serum. No hypoglycemia. Postprandial BGs well controlled. BG have been stable and mostly at goal 100-180mg/dL while on a Consistent Carbohydrate Diet. Patient pending d/c to rehab today per primary team. Blood glucose levels in the last 24hrs have been 117-159mg/dL.     Review of Systems:  General: As above.  Respiratory: Denies any SOB, MAKI, or cough.  Gastrointestinal: Denies any n/v/d or abdominal pain.   Endocrine: Denies any polyuria, polydipsia, polyphagia, visual changes, or numbness in feet.     Allergies  No Known Allergies      Intolerances  None.       MEDICATIONS  (STANDING):  acetaminophen   IVPB .. 1000 milliGRAM(s) IV Intermittent every 6 hours PRN  cefTRIAXone   IVPB 1000 milliGRAM(s) IV Intermittent every 24 hours  chlorhexidine 2% Cloths 1 Application(s) Topical <User Schedule>  dextrose Oral Gel 15 Gram(s) Oral once PRN  enoxaparin Injectable 40 milliGRAM(s) SubCutaneous every 24 hours  insulin glargine Injectable (LANTUS) 26 Unit(s) SubCutaneous at bedtime  insulin lispro (ADMELOG) corrective regimen sliding scale   SubCutaneous at bedtime  insulin lispro (ADMELOG) corrective regimen sliding scale   SubCutaneous three times a day before meals  insulin lispro Injectable (ADMELOG) 2 Unit(s) SubCutaneous at bedtime  insulin lispro Injectable (ADMELOG) 8 Unit(s) SubCutaneous three times a day before meals  melatonin 3 milliGRAM(s) Oral at bedtime  metroNIDAZOLE    Tablet 500 milliGRAM(s) Oral every 12 hours  pancrelipase  (CREON 12,000 Lipase Units) 1 Capsule(s) Oral three times a day with meals  senna 2 Tablet(s) Oral at bedtime      dextrose Oral Gel 15 Gram(s) Oral once PRN  insulin glargine Injectable (LANTUS) 26 Unit(s) SubCutaneous at bedtime  insulin lispro (ADMELOG) corrective regimen sliding scale   SubCutaneous at bedtime  insulin lispro (ADMELOG) corrective regimen sliding scale   SubCutaneous three times a day before meals  insulin lispro Injectable (ADMELOG) 2 Unit(s) SubCutaneous at bedtime  insulin lispro Injectable (ADMELOG) 8 Unit(s) SubCutaneous three times a day before meals      insulin lispro (ADMELOG) corrective regimen sliding scale   SubCutaneous at bedtime  insulin lispro (ADMELOG) corrective regimen sliding scale   SubCutaneous three times a day before meals  insulin lispro Injectable (ADMELOG) 2 Unit(s) SubCutaneous at bedtime  insulin lispro Injectable (ADMELOG) 8 Unit(s) SubCutaneous three times a day before meals      PHYSICAL EXAM:  VITALS:   T(C): 36.9 (03-20-25 @ 09:02), Max: 36.9 (03-19-25 @ 12:14)  HR: 88 (03-20-25 @ 09:02) (79 - 92)  BP: 121/78 (03-20-25 @ 09:02) (102/68 - 121/85)  RR: 18 (03-20-25 @ 09:02) (18 - 18)  SpO2: 98% (03-20-25 @ 09:02) (98% - 99%)    GENERAL: In no acute distress  Respiratory: Respirations unlabored  Extremities: Warm and dry, no edema  NEURO: Alert and oriented, appropriate     LABS:  POCT Blood Glucose.: 117 mg/dL (03-20-25 @ 08:59)  POCT Blood Glucose.: 139 mg/dL (03-19-25 @ 22:10)  POCT Blood Glucose.: 144 mg/dL (03-19-25 @ 20:17)  POCT Blood Glucose.: 159 mg/dL (03-19-25 @ 12:40)  POCT Blood Glucose.: 227 mg/dL (03-19-25 @ 09:09)  POCT Blood Glucose.: 209 mg/dL (03-18-25 @ 21:32)  POCT Blood Glucose.: 152 mg/dL (03-18-25 @ 17:33)  POCT Blood Glucose.: 214 mg/dL (03-18-25 @ 12:15)  POCT Blood Glucose.: 145 mg/dL (03-18-25 @ 09:06)  POCT Blood Glucose.: 156 mg/dL (03-18-25 @ 07:33)  POCT Blood Glucose.: 145 mg/dL (03-17-25 @ 21:06)  POCT Blood Glucose.: 88 mg/dL (03-17-25 @ 18:25)  POCT Blood Glucose.: 109 mg/dL (03-17-25 @ 14:44)                          12.6   7.59  )-----------( 720      ( 20 Mar 2025 06:43 )             38.6     03-20    137  |  104  |  21  ----------------------------<  137[H]  3.8   |  21[L]  |  0.75    Ca    8.8      20 Mar 2025 06:43  Phos  3.6     03-20  Mg     2.3     03-20          Urinalysis Basic - ( 20 Mar 2025 06:43 )    Color: x / Appearance: x / SG: x / pH: x  Gluc: 137 mg/dL / Ketone: x  / Bili: x / Urobili: x   Blood: x / Protein: x / Nitrite: x   Leuk Esterase: x / RBC: x / WBC x   Sq Epi: x / Non Sq Epi: x / Bacteria: x      A1C with Estimated Average Glucose Result: A1C with Estimated Average Glucose Result: 9.3 % (03-08-25 @ 05:35)

## 2025-03-20 NOTE — PROGRESS NOTE ADULT - ASSESSMENT
59M PMH DM, pancreatic cancer, s/p whipple in [facility] presents with scrotal and perineal swelling c/f Fornier's gangrene. The patient is now s/p I&D of Perineum and Groin, and Scrotal Exploration with ACS/Trauma and Urology now s/p Irrigation and debridement of perineum and groin.     Recommendations  - Wet to dry dressing changes  - Wound care recs appreciated - pt will f/u in wound care center upon d/c  - ID recs appreciated - continue with Flagyl until 3/28  - Endo following, appreciate recs   - DVT ppx: Lvx  - Diet: CC  - pain control PRN   - Dispo planning ongoing - home vs [facility]    Acute care surgery, pager#[phone]

## 2025-03-20 NOTE — PROGRESS NOTE ADULT - NUTRITIONAL ASSESSMENT
Diet, Consistent Carbohydrate w/Evening Snack:   Halal  Paul(7 Gm Arginine/7 Gm Glut/1.2 Gm HMB     Qty per Day:  2 (03-17-25 @ 22:03) [Active]
Diet, Consistent Carbohydrate w/Evening Snack (03-09-25 @ 14:14) [Active]
Diet, Consistent Carbohydrate w/Evening Snack:   Halal  Paul(7 Gm Arginine/7 Gm Glut/1.2 Gm HMB     Qty per Day:  2 (03-17-25 @ 22:03) [Active]
Diet, Consistent Carbohydrate w/Evening Snack (03-09-25 @ 14:14) [Active]

## 2025-03-21 ENCOUNTER — APPOINTMENT (OUTPATIENT)
Dept: SURGERY | Facility: CLINIC | Age: 60
End: 2025-03-21

## 2025-03-23 NOTE — CHART NOTE - NSCHARTNOTESELECT_GEN_ALL_CORE
Incidentals
Transfer Note
Answering service message
Endocrinology/Event Note
Endocrinology/Event Note
Post-Procedure Note

## 2025-03-23 NOTE — CHART NOTE - NSCHARTNOTEFT_GEN_A_CORE
I received a message from the answering service today from the patient's son (L[place], [phone]). I called him back - he said that his father's wound has been bleeding "a lot" and that during the dressing change today, the nurse at the rehab did not listen to his father's instructions and the dressing change was very painful. He is very concerned that his father isn't getting his medications on time and that the wound is not being cared for appropriately. I advised him that if he thinks his father is not receiving appropriate care, he should ask the rehab center to send him back to the ED, but that unfortunately, we cannot keep the patient in the hospital until the wound heals. Questions answered.

## 2025-03-24 ENCOUNTER — APPOINTMENT (OUTPATIENT)
Dept: PULMONOLOGY | Facility: CLINIC | Age: 60
End: 2025-03-24

## 2025-03-31 ENCOUNTER — APPOINTMENT (OUTPATIENT)
Dept: SURGERY | Facility: CLINIC | Age: 60
End: 2025-03-31
Payer: MEDICAID

## 2025-03-31 DIAGNOSIS — N49.3 FOURNIER GANGRENE: ICD-10-CM

## 2025-03-31 PROCEDURE — 99024 POSTOP FOLLOW-UP VISIT: CPT

## 2025-03-31 RX ORDER — COLLAGENASE SANTYL 250 [ARB'U]/G
250 OINTMENT TOPICAL DAILY
Qty: 1 | Refills: 3 | Status: ACTIVE | OUTPATIENT
Start: 2025-03-31

## 2025-04-07 ENCOUNTER — APPOINTMENT (OUTPATIENT)
Dept: TRAUMA SURGERY | Facility: CLINIC | Age: 60
End: 2025-04-07
Payer: MEDICAID

## 2025-04-07 PROCEDURE — 99024 POSTOP FOLLOW-UP VISIT: CPT

## 2025-04-07 RX ORDER — SODIUM HYPOCHLORITE 1.25 MG/ML
0.12 SOLUTION TOPICAL DAILY
Qty: 1 | Refills: 2 | Status: ACTIVE | COMMUNITY
Start: 2025-04-07 | End: 1900-01-01

## 2025-04-07 RX ORDER — COLLAGENASE SANTYL 250 [ARB'U]/G
250 OINTMENT TOPICAL DAILY
Qty: 1 | Refills: 3 | Status: ACTIVE | COMMUNITY
Start: 2025-04-07 | End: 1900-01-01

## 2025-04-08 ENCOUNTER — OUTPATIENT (OUTPATIENT)
Dept: OUTPATIENT SERVICES | Facility: HOSPITAL | Age: 60
LOS: 1 days | End: 2025-04-08
Payer: MEDICAID

## 2025-04-08 ENCOUNTER — APPOINTMENT (OUTPATIENT)
Dept: INFECTIOUS DISEASE | Facility: CLINIC | Age: 60
End: 2025-04-08
Payer: MEDICAID

## 2025-04-08 VITALS
HEART RATE: 80 BPM | HEIGHT: 66 IN | WEIGHT: 141 LBS | DIASTOLIC BLOOD PRESSURE: 56 MMHG | BODY MASS INDEX: 22.66 KG/M2 | TEMPERATURE: 97.8 F | OXYGEN SATURATION: 98 % | SYSTOLIC BLOOD PRESSURE: 94 MMHG

## 2025-04-08 VITALS — BODY MASS INDEX: 22.11 KG/M2 | WEIGHT: 137 LBS

## 2025-04-08 DIAGNOSIS — B97.89 OTHER VIRAL AGENTS AS THE CAUSE OF DISEASES CLASSIFIED ELSEWHERE: ICD-10-CM

## 2025-04-08 PROCEDURE — 99213 OFFICE O/P EST LOW 20 MIN: CPT

## 2025-04-08 PROCEDURE — G0463: CPT

## 2025-04-08 RX ORDER — COLLAGENASE SANTYL 250 [ARB'U]/G
250 OINTMENT TOPICAL DAILY
Qty: 1 | Refills: 0 | Status: ACTIVE | COMMUNITY
Start: 2025-04-08 | End: 1900-01-01

## 2025-04-10 DIAGNOSIS — L03.90 CELLULITIS, UNSPECIFIED: ICD-10-CM

## 2025-04-14 ENCOUNTER — APPOINTMENT (OUTPATIENT)
Dept: WOUND CARE | Facility: HOSPITAL | Age: 60
End: 2025-04-14
Payer: MEDICAID

## 2025-04-14 ENCOUNTER — APPOINTMENT (OUTPATIENT)
Dept: PULMONOLOGY | Facility: CLINIC | Age: 60
End: 2025-04-14

## 2025-04-14 VITALS
DIASTOLIC BLOOD PRESSURE: 69 MMHG | SYSTOLIC BLOOD PRESSURE: 107 MMHG | TEMPERATURE: 97.7 F | OXYGEN SATURATION: 97 % | HEART RATE: 79 BPM | RESPIRATION RATE: 18 BRPM

## 2025-04-14 DIAGNOSIS — S31.30XA UNSPECIFIED OPEN WOUND OF SCROTUM AND TESTES, INITIAL ENCOUNTER: ICD-10-CM

## 2025-04-14 DIAGNOSIS — N49.3 FOURNIER GANGRENE: ICD-10-CM

## 2025-04-14 DIAGNOSIS — K61.39 OTHER ISCHIORECTAL ABSCESS: ICD-10-CM

## 2025-04-14 PROCEDURE — 99205 OFFICE O/P NEW HI 60 MIN: CPT

## 2025-04-14 PROCEDURE — 99215 OFFICE O/P EST HI 40 MIN: CPT

## 2025-04-28 ENCOUNTER — OUTPATIENT (OUTPATIENT)
Dept: OUTPATIENT SERVICES | Facility: HOSPITAL | Age: 60
LOS: 1 days | End: 2025-04-28
Payer: MEDICAID

## 2025-04-28 ENCOUNTER — APPOINTMENT (OUTPATIENT)
Dept: INFECTIOUS DISEASE | Facility: CLINIC | Age: 60
End: 2025-04-28
Payer: MEDICAID

## 2025-04-28 VITALS
WEIGHT: 137 LBS | SYSTOLIC BLOOD PRESSURE: 112 MMHG | HEART RATE: 72 BPM | TEMPERATURE: 97.8 F | HEIGHT: 66 IN | OXYGEN SATURATION: 97 % | DIASTOLIC BLOOD PRESSURE: 68 MMHG | BODY MASS INDEX: 22.02 KG/M2

## 2025-04-28 DIAGNOSIS — B97.89 OTHER VIRAL AGENTS AS THE CAUSE OF DISEASES CLASSIFIED ELSEWHERE: ICD-10-CM

## 2025-04-28 PROCEDURE — 99213 OFFICE O/P EST LOW 20 MIN: CPT

## 2025-04-28 PROCEDURE — G0463: CPT

## 2025-04-29 DIAGNOSIS — M72.6 NECROTIZING FASCIITIS: ICD-10-CM

## 2025-05-01 ENCOUNTER — APPOINTMENT (OUTPATIENT)
Dept: WOUND CARE | Facility: HOSPITAL | Age: 60
End: 2025-05-01

## 2025-05-12 ENCOUNTER — APPOINTMENT (OUTPATIENT)
Dept: PULMONOLOGY | Facility: CLINIC | Age: 60
End: 2025-05-12
Payer: MEDICAID

## 2025-05-12 ENCOUNTER — APPOINTMENT (OUTPATIENT)
Dept: WOUND CARE | Facility: HOSPITAL | Age: 60
End: 2025-05-12
Payer: MEDICAID

## 2025-05-12 VITALS
RESPIRATION RATE: 17 BRPM | HEART RATE: 65 BPM | WEIGHT: 139 LBS | OXYGEN SATURATION: 97 % | HEIGHT: 66 IN | DIASTOLIC BLOOD PRESSURE: 71 MMHG | BODY MASS INDEX: 22.34 KG/M2 | SYSTOLIC BLOOD PRESSURE: 107 MMHG

## 2025-05-12 VITALS
TEMPERATURE: 98.2 F | SYSTOLIC BLOOD PRESSURE: 126 MMHG | OXYGEN SATURATION: 98 % | DIASTOLIC BLOOD PRESSURE: 80 MMHG | HEART RATE: 67 BPM | RESPIRATION RATE: 18 BRPM

## 2025-05-12 DIAGNOSIS — K61.39 OTHER ISCHIORECTAL ABSCESS: ICD-10-CM

## 2025-05-12 DIAGNOSIS — C25.9 MALIGNANT NEOPLASM OF PANCREAS, UNSPECIFIED: ICD-10-CM

## 2025-05-12 DIAGNOSIS — N49.3 FOURNIER GANGRENE: ICD-10-CM

## 2025-05-12 DIAGNOSIS — R91.1 SOLITARY PULMONARY NODULE: ICD-10-CM

## 2025-05-12 PROCEDURE — 99214 OFFICE O/P EST MOD 30 MIN: CPT | Mod: 25

## 2025-05-12 PROCEDURE — 99205 OFFICE O/P NEW HI 60 MIN: CPT

## 2025-05-30 ENCOUNTER — APPOINTMENT (OUTPATIENT)
Dept: WOUND CARE | Facility: HOSPITAL | Age: 60
End: 2025-05-30
Payer: MEDICAID

## 2025-05-30 ENCOUNTER — APPOINTMENT (OUTPATIENT)
Dept: UROLOGY | Facility: CLINIC | Age: 60
End: 2025-05-30

## 2025-05-30 ENCOUNTER — OUTPATIENT (OUTPATIENT)
Dept: OUTPATIENT SERVICES | Facility: HOSPITAL | Age: 60
LOS: 1 days | End: 2025-05-30
Payer: COMMERCIAL

## 2025-05-30 VITALS
RESPIRATION RATE: 16 BRPM | DIASTOLIC BLOOD PRESSURE: 75 MMHG | TEMPERATURE: 98.2 F | HEART RATE: 79 BPM | SYSTOLIC BLOOD PRESSURE: 116 MMHG | OXYGEN SATURATION: 98 %

## 2025-05-30 DIAGNOSIS — N49.3 FOURNIER GANGRENE: ICD-10-CM

## 2025-05-30 DIAGNOSIS — E11.9 TYPE 2 DIABETES MELLITUS W/OUT COMPLICATIONS: ICD-10-CM

## 2025-05-30 DIAGNOSIS — S31.30XA UNSPECIFIED OPEN WOUND OF SCROTUM AND TESTES, INITIAL ENCOUNTER: ICD-10-CM

## 2025-05-30 PROCEDURE — 99213 OFFICE O/P EST LOW 20 MIN: CPT

## 2025-05-30 PROCEDURE — G0463: CPT

## 2025-06-02 ENCOUNTER — RESULT REVIEW (OUTPATIENT)
Age: 60
End: 2025-06-02

## 2025-06-02 ENCOUNTER — APPOINTMENT (OUTPATIENT)
Dept: CT IMAGING | Facility: IMAGING CENTER | Age: 60
End: 2025-06-02
Payer: MEDICAID

## 2025-06-02 ENCOUNTER — OUTPATIENT (OUTPATIENT)
Dept: OUTPATIENT SERVICES | Facility: HOSPITAL | Age: 60
LOS: 1 days | End: 2025-06-02
Payer: MEDICAID

## 2025-06-02 DIAGNOSIS — Z00.8 ENCOUNTER FOR OTHER GENERAL EXAMINATION: ICD-10-CM

## 2025-06-02 DIAGNOSIS — R91.1 SOLITARY PULMONARY NODULE: ICD-10-CM

## 2025-06-02 PROCEDURE — 71250 CT THORAX DX C-: CPT

## 2025-06-02 PROCEDURE — 71250 CT THORAX DX C-: CPT | Mod: 26

## 2025-06-05 ENCOUNTER — APPOINTMENT (OUTPATIENT)
Dept: UROLOGY | Facility: CLINIC | Age: 60
End: 2025-06-05

## 2025-06-10 ENCOUNTER — APPOINTMENT (OUTPATIENT)
Dept: UROLOGY | Facility: CLINIC | Age: 60
End: 2025-06-10
Payer: MEDICAID

## 2025-06-10 VITALS
SYSTOLIC BLOOD PRESSURE: 105 MMHG | TEMPERATURE: 97.3 F | WEIGHT: 139 LBS | BODY MASS INDEX: 22.34 KG/M2 | HEART RATE: 81 BPM | DIASTOLIC BLOOD PRESSURE: 70 MMHG | OXYGEN SATURATION: 98 % | RESPIRATION RATE: 16 BRPM | HEIGHT: 66 IN

## 2025-06-10 PROBLEM — N40.1 BENIGN PROSTATIC HYPERPLASIA WITH LOWER URINARY TRACT SYMPTOMS, SYMPTOM DETAILS UNSPECIFIED: Status: ACTIVE | Noted: 2025-06-10

## 2025-06-10 PROCEDURE — 99213 OFFICE O/P EST LOW 20 MIN: CPT | Mod: 25

## 2025-06-10 PROCEDURE — 51798 US URINE CAPACITY MEASURE: CPT

## 2025-06-12 ENCOUNTER — NON-APPOINTMENT (OUTPATIENT)
Age: 60
End: 2025-06-12

## 2025-06-12 ENCOUNTER — APPOINTMENT (OUTPATIENT)
Dept: WOUND CARE | Facility: HOSPITAL | Age: 60
End: 2025-06-12
Payer: MEDICAID

## 2025-06-12 ENCOUNTER — OUTPATIENT (OUTPATIENT)
Dept: OUTPATIENT SERVICES | Facility: HOSPITAL | Age: 60
LOS: 1 days | End: 2025-06-12
Payer: MEDICAID

## 2025-06-12 VITALS
TEMPERATURE: 97.9 F | SYSTOLIC BLOOD PRESSURE: 118 MMHG | RESPIRATION RATE: 17 BRPM | HEART RATE: 73 BPM | DIASTOLIC BLOOD PRESSURE: 74 MMHG | OXYGEN SATURATION: 98 %

## 2025-06-12 PROCEDURE — 99214 OFFICE O/P EST MOD 30 MIN: CPT

## 2025-06-12 PROCEDURE — G0463: CPT

## 2025-06-12 RX ORDER — MUPIROCIN 20 MG/G
2 OINTMENT TOPICAL
Qty: 1 | Refills: 1 | Status: ACTIVE | COMMUNITY
Start: 2025-06-12 | End: 1900-01-01

## 2025-06-13 ENCOUNTER — OUTPATIENT (OUTPATIENT)
Dept: OUTPATIENT SERVICES | Facility: HOSPITAL | Age: 60
LOS: 1 days | End: 2025-06-13
Payer: MEDICAID

## 2025-06-13 ENCOUNTER — APPOINTMENT (OUTPATIENT)
Dept: WOUND CARE | Facility: HOSPITAL | Age: 60
End: 2025-06-13
Payer: MEDICAID

## 2025-06-13 VITALS
TEMPERATURE: 98.1 F | DIASTOLIC BLOOD PRESSURE: 44 MMHG | RESPIRATION RATE: 16 BRPM | HEART RATE: 66 BPM | OXYGEN SATURATION: 98 % | SYSTOLIC BLOOD PRESSURE: 78 MMHG

## 2025-06-13 VITALS — DIASTOLIC BLOOD PRESSURE: 55 MMHG | SYSTOLIC BLOOD PRESSURE: 96 MMHG | HEART RATE: 71 BPM

## 2025-06-13 LAB — BACTERIA UR CULT: NORMAL

## 2025-06-13 PROCEDURE — 99214 OFFICE O/P EST MOD 30 MIN: CPT

## 2025-06-13 PROCEDURE — G0463: CPT

## 2025-06-16 NOTE — ED ADULT NURSE NOTE - NSFALLRSKUNASSIST_ED_ALL_ED
Sedation or General Anesthesia, Adult  Care After  Refer to this sheet in the next 24 hours. These instructions provide you with information on caring for yourself after your procedure. Your caregiver may also give you more specific instructions. Your treatment has been planned according to current medical practices, but problems sometimes occur. Call your caregiver if you have any problems or questions after your procedure.   HOME CARE INSTRUCTIONS   Do not participate in any activities that require you to be alert or coordinated. Do not:  Drive.  Swim.  Ride a bicycle.  Operate heavy machinery.  Cook.  Use power tools.  Climb ladders.  Work at heights.  Take a bath.  Do not drink alcohol.  Do not make any important decisions or sign legal documents.  Stay with an adult.  The first meal following your procedure should be light and small. Avoid solid foods if you feel sick to your stomach (nauseous) or if you throw up (vomit).  Drink enough fluids to keep your urine clear or pale yellow.  Only take your usual medicines or new medicines if your caregiver approves them.  Only take over-the-counter or prescription medicines for pain, discomfort, or fever as directed by your caregiver.  Keep all follow-up appointments as directed by your caregiver.  SEEK IMMEDIATE MEDICAL CARE IF:   You are not feeling normal or behaving normally after 24 hours.  You have persistent nausea and vomiting.  You are unable to drink fluids or eat food.  You have difficulty urinating.  You have difficulty breathing or speaking.  You have blue or gray skin.  There is difficulty waking or you cannot be woken up.  You have heavy bleeding, redness, or a lot of swelling where the sedative or anesthesia entered your skin (intravenous site).  You have a rash.  MAKE SURE YOU:  Understand these instructions.  Will watch your condition.  Will get help right away if you are not doing well or get worse.  Document Released: 12/18/2006 Document Revised:  no

## 2025-06-24 ENCOUNTER — NON-APPOINTMENT (OUTPATIENT)
Age: 60
End: 2025-06-24

## 2025-06-24 ENCOUNTER — APPOINTMENT (OUTPATIENT)
Dept: UROLOGY | Facility: CLINIC | Age: 60
End: 2025-06-24
Payer: MEDICAID

## 2025-06-24 VITALS
BODY MASS INDEX: 21.86 KG/M2 | RESPIRATION RATE: 16 BRPM | OXYGEN SATURATION: 98 % | TEMPERATURE: 97.8 F | WEIGHT: 136 LBS | SYSTOLIC BLOOD PRESSURE: 107 MMHG | HEART RATE: 80 BPM | DIASTOLIC BLOOD PRESSURE: 69 MMHG | HEIGHT: 66 IN

## 2025-06-24 PROBLEM — R97.20 ELEVATED PSA: Status: ACTIVE | Noted: 2025-06-10

## 2025-06-24 PROCEDURE — 99214 OFFICE O/P EST MOD 30 MIN: CPT

## 2025-06-25 ENCOUNTER — NON-APPOINTMENT (OUTPATIENT)
Age: 60
End: 2025-06-25

## 2025-06-25 LAB
PSA FREE FLD-MCNC: 22 %
PSA FREE SERPL-MCNC: 0.69 NG/ML
PSA SERPL-MCNC: 3.12 NG/ML

## 2025-06-30 ENCOUNTER — OUTPATIENT (OUTPATIENT)
Dept: OUTPATIENT SERVICES | Facility: HOSPITAL | Age: 60
LOS: 1 days | End: 2025-06-30
Payer: MEDICAID

## 2025-06-30 ENCOUNTER — APPOINTMENT (OUTPATIENT)
Dept: WOUND CARE | Facility: HOSPITAL | Age: 60
End: 2025-06-30
Payer: MEDICAID

## 2025-06-30 VITALS
BODY MASS INDEX: 21.86 KG/M2 | SYSTOLIC BLOOD PRESSURE: 92 MMHG | OXYGEN SATURATION: 98 % | HEIGHT: 66 IN | WEIGHT: 136 LBS | HEART RATE: 65 BPM | TEMPERATURE: 98 F | RESPIRATION RATE: 16 BRPM | DIASTOLIC BLOOD PRESSURE: 55 MMHG

## 2025-06-30 PROBLEM — Z87.828 HEALED WOUND: Status: ACTIVE | Noted: 2025-06-30

## 2025-06-30 PROCEDURE — 99214 OFFICE O/P EST MOD 30 MIN: CPT

## 2025-06-30 PROCEDURE — G0463: CPT

## 2025-07-01 ENCOUNTER — NON-APPOINTMENT (OUTPATIENT)
Age: 60
End: 2025-07-01

## 2025-07-02 ENCOUNTER — APPOINTMENT (OUTPATIENT)
Dept: MRI IMAGING | Facility: IMAGING CENTER | Age: 60
End: 2025-07-02

## 2025-07-03 ENCOUNTER — NON-APPOINTMENT (OUTPATIENT)
Age: 60
End: 2025-07-03

## 2025-07-08 ENCOUNTER — APPOINTMENT (OUTPATIENT)
Dept: PULMONOLOGY | Facility: HOSPITAL | Age: 60
End: 2025-07-08

## 2025-07-09 ENCOUNTER — APPOINTMENT (OUTPATIENT)
Dept: WOUND CARE | Facility: HOSPITAL | Age: 60
End: 2025-07-09
Payer: COMMERCIAL

## 2025-07-09 ENCOUNTER — OUTPATIENT (OUTPATIENT)
Dept: OUTPATIENT SERVICES | Facility: HOSPITAL | Age: 60
LOS: 1 days | End: 2025-07-09
Payer: MEDICAID

## 2025-07-09 PROCEDURE — 99214 OFFICE O/P EST MOD 30 MIN: CPT

## 2025-07-09 PROCEDURE — G0463: CPT

## 2025-07-11 ENCOUNTER — OUTPATIENT (OUTPATIENT)
Dept: OUTPATIENT SERVICES | Facility: HOSPITAL | Age: 60
LOS: 1 days | Discharge: HOME | End: 2025-07-11

## 2025-07-11 ENCOUNTER — APPOINTMENT (OUTPATIENT)
Dept: UROLOGY | Facility: CLINIC | Age: 60
End: 2025-07-11

## 2025-07-11 DIAGNOSIS — C25.9 MALIGNANT NEOPLASM OF PANCREAS, UNSPECIFIED: ICD-10-CM

## 2025-07-21 ENCOUNTER — APPOINTMENT (OUTPATIENT)
Dept: PULMONOLOGY | Facility: CLINIC | Age: 60
End: 2025-07-21

## 2025-07-23 ENCOUNTER — APPOINTMENT (OUTPATIENT)
Dept: WOUND CARE | Facility: HOSPITAL | Age: 60
End: 2025-07-23
Payer: COMMERCIAL

## 2025-07-23 ENCOUNTER — OUTPATIENT (OUTPATIENT)
Dept: OUTPATIENT SERVICES | Facility: HOSPITAL | Age: 60
LOS: 1 days | End: 2025-07-23
Payer: MEDICAID

## 2025-07-23 DIAGNOSIS — K61.39 OTHER ISCHIORECTAL ABSCESS: ICD-10-CM

## 2025-07-23 PROCEDURE — 99214 OFFICE O/P EST MOD 30 MIN: CPT

## 2025-07-23 PROCEDURE — G0463: CPT

## 2025-07-24 ENCOUNTER — APPOINTMENT (OUTPATIENT)
Dept: HEMATOLOGY ONCOLOGY | Facility: CLINIC | Age: 60
End: 2025-07-24

## 2025-07-24 VITALS
RESPIRATION RATE: 17 BRPM | BODY MASS INDEX: 22.06 KG/M2 | OXYGEN SATURATION: 99 % | SYSTOLIC BLOOD PRESSURE: 115 MMHG | HEART RATE: 71 BPM | DIASTOLIC BLOOD PRESSURE: 77 MMHG | WEIGHT: 136.66 LBS | TEMPERATURE: 98 F

## 2025-07-24 DIAGNOSIS — K76.9 LIVER DISEASE, UNSPECIFIED: ICD-10-CM

## 2025-07-24 PROCEDURE — 99214 OFFICE O/P EST MOD 30 MIN: CPT

## 2025-07-24 PROCEDURE — G2211 COMPLEX E/M VISIT ADD ON: CPT | Mod: NC

## 2025-07-28 ENCOUNTER — APPOINTMENT (OUTPATIENT)
Dept: PULMONOLOGY | Facility: CLINIC | Age: 60
End: 2025-07-28
Payer: COMMERCIAL

## 2025-07-28 VITALS
HEIGHT: 66 IN | HEART RATE: 81 BPM | RESPIRATION RATE: 16 BRPM | BODY MASS INDEX: 21.86 KG/M2 | WEIGHT: 136 LBS | SYSTOLIC BLOOD PRESSURE: 114 MMHG | OXYGEN SATURATION: 94 % | DIASTOLIC BLOOD PRESSURE: 74 MMHG

## 2025-07-28 DIAGNOSIS — R91.1 SOLITARY PULMONARY NODULE: ICD-10-CM

## 2025-07-28 DIAGNOSIS — T14.8XXA OTHER INJURY OF UNSPECIFIED BODY REGION, INITIAL ENCOUNTER: ICD-10-CM

## 2025-07-28 PROCEDURE — 99215 OFFICE O/P EST HI 40 MIN: CPT

## 2025-07-28 RX ORDER — LACTOSE-REDUCED FOOD
LIQUID (ML) ORAL
Qty: 90 | Refills: 0 | Status: DISCONTINUED | COMMUNITY
Start: 2025-07-28 | End: 2025-07-28

## 2025-07-29 RX ORDER — ARGININE/GLUTAMINE/CALCIUM BMB 7G-7G-1.5G
POWDER IN PACKET (EA) ORAL
Qty: 90 | Refills: 0 | Status: ACTIVE | COMMUNITY
Start: 2025-07-29 | End: 1900-01-01

## 2025-08-07 ENCOUNTER — APPOINTMENT (OUTPATIENT)
Dept: WOUND CARE | Facility: HOSPITAL | Age: 60
End: 2025-08-07
Payer: COMMERCIAL

## 2025-08-07 ENCOUNTER — OUTPATIENT (OUTPATIENT)
Dept: OUTPATIENT SERVICES | Facility: HOSPITAL | Age: 60
LOS: 1 days | End: 2025-08-07
Payer: MEDICAID

## 2025-08-07 VITALS
TEMPERATURE: 98.5 F | RESPIRATION RATE: 16 BRPM | SYSTOLIC BLOOD PRESSURE: 111 MMHG | DIASTOLIC BLOOD PRESSURE: 70 MMHG | OXYGEN SATURATION: 98 % | HEART RATE: 73 BPM

## 2025-08-07 DIAGNOSIS — N49.3 FOURNIER GANGRENE: ICD-10-CM

## 2025-08-07 DIAGNOSIS — C25.9 MALIGNANT NEOPLASM OF PANCREAS, UNSPECIFIED: ICD-10-CM

## 2025-08-07 DIAGNOSIS — Z85.9 PERSONAL HISTORY OF MALIGNANT NEOPLASM, UNSPECIFIED: ICD-10-CM

## 2025-08-07 PROCEDURE — 99213 OFFICE O/P EST LOW 20 MIN: CPT

## 2025-08-07 PROCEDURE — G0463: CPT

## 2025-09-02 ENCOUNTER — APPOINTMENT (OUTPATIENT)
Dept: WOUND CARE | Facility: HOSPITAL | Age: 60
End: 2025-09-02

## 2025-09-02 ENCOUNTER — OUTPATIENT (OUTPATIENT)
Dept: OUTPATIENT SERVICES | Facility: HOSPITAL | Age: 60
LOS: 1 days | End: 2025-09-02
Payer: MEDICAID

## 2025-09-02 VITALS
DIASTOLIC BLOOD PRESSURE: 71 MMHG | SYSTOLIC BLOOD PRESSURE: 117 MMHG | OXYGEN SATURATION: 98 % | RESPIRATION RATE: 16 BRPM | HEART RATE: 61 BPM | TEMPERATURE: 98.4 F

## 2025-09-02 DIAGNOSIS — S31.30XA UNSPECIFIED OPEN WOUND OF SCROTUM AND TESTES, INITIAL ENCOUNTER: ICD-10-CM

## 2025-09-02 DIAGNOSIS — K62.89 OTHER SPECIFIED DISEASES OF ANUS AND RECTUM: ICD-10-CM

## 2025-09-02 DIAGNOSIS — N49.3 FOURNIER GANGRENE: ICD-10-CM

## 2025-09-02 DIAGNOSIS — K86.89 OTHER SPECIFIED DISEASES OF PANCREAS: ICD-10-CM

## 2025-09-02 DIAGNOSIS — R13.10 DYSPHAGIA, UNSPECIFIED: ICD-10-CM

## 2025-09-02 PROCEDURE — 99213 OFFICE O/P EST LOW 20 MIN: CPT

## 2025-09-02 PROCEDURE — G0463: CPT

## 2025-09-19 DIAGNOSIS — S31.30XA UNSPECIFIED OPEN WOUND OF SCROTUM AND TESTES, INITIAL ENCOUNTER: ICD-10-CM

## 2025-09-19 DIAGNOSIS — K86.89 OTHER SPECIFIED DISEASES OF PANCREAS: ICD-10-CM

## 2025-09-19 DIAGNOSIS — C25.9 MALIGNANT NEOPLASM OF PANCREAS, UNSPECIFIED: ICD-10-CM

## 2025-09-19 DIAGNOSIS — Z87.828 PERSONAL HISTORY OF OTHER (HEALED) PHYSICAL INJURY AND TRAUMA: ICD-10-CM
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
## 2025-09-19 DIAGNOSIS — N49.3 FOURNIER GANGRENE: ICD-10-CM

## 2025-09-19 DIAGNOSIS — K61.39 OTHER ISCHIORECTAL ABSCESS: ICD-10-CM
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
## 2025-09-19 DIAGNOSIS — E11.9 TYPE 2 DIABETES MELLITUS WITHOUT COMPLICATIONS: ICD-10-CM

## 2025-09-19 DIAGNOSIS — Z85.9 PERSONAL HISTORY OF MALIGNANT NEOPLASM, UNSPECIFIED: ICD-10-CM

## 2025-09-19 DIAGNOSIS — R13.10 DYSPHAGIA, UNSPECIFIED: ICD-10-CM

## 2025-09-19 DIAGNOSIS — K62.89 OTHER SPECIFIED DISEASES OF ANUS AND RECTUM: ICD-10-CM

## (undated) DEVICE — DRAPE 3/4 SHEET W REINFORCEMENT 56X77"

## (undated) DEVICE — DRAPE 1/2 SHEET 40X57"

## (undated) DEVICE — SOL IRR POUR NS 0.9% 500ML

## (undated) DEVICE — VENODYNE/SCD SLEEVE CALF MEDIUM

## (undated) DEVICE — SOL IRR BAG NS 0.9% 3000ML

## (undated) DEVICE — DRSG VAC GRANUFOAM SMALL (BLACK)

## (undated) DEVICE — SOL IRR POUR H2O 250ML

## (undated) DEVICE — DRAIN RESERVOIR FOR JACKSON PRATT 100CC CARDINAL

## (undated) DEVICE — PREP BETADINE KIT

## (undated) DEVICE — LAP PAD 18 X 18"

## (undated) DEVICE — MEDICATION LABELS W MARKER

## (undated) DEVICE — DRSG XEROFORM 5 X 9"

## (undated) DEVICE — CANISTER W/GEL INFOVAC 1000ML

## (undated) DEVICE — GLV 7.5 PROTEXIS (WHITE)

## (undated) DEVICE — CANISTER KCI 500ML GEL SENSA TRAC

## (undated) DEVICE — WARMING BLANKET UPPER ADULT

## (undated) DEVICE — POSITIONER FOAM EGG CRATE ULNAR 2PCS (PINK)

## (undated) DEVICE — DRAPE MAYO STAND 30"

## (undated) DEVICE — FOLEY TRAY 16FR 5CC LTX UMETER CLOSED

## (undated) DEVICE — DRAPE LIGHT HANDLE COVER (BLUE)

## (undated) DEVICE — GOWN TRIMAX LG

## (undated) DEVICE — DRAIN JACKSON PRATT 10MM FLAT FULL NO TROCAR

## (undated) DEVICE — DRSG VAC GRANUFOAM LARGE (BLACK)

## (undated) DEVICE — VISITEC 4X4

## (undated) DEVICE — GLV 7 PROTEXIS (WHITE)

## (undated) DEVICE — GLV 8.5 PROTEXIS (WHITE)

## (undated) DEVICE — DRAPE TOWEL BLUE 17" X 24"

## (undated) DEVICE — SPECIMEN CONTAINER 100ML

## (undated) DEVICE — DRAPE INSTRUMENT POUCH 6.75" X 11"

## (undated) DEVICE — GLV 8 PROTEXIS (WHITE)

## (undated) DEVICE — PACK MINOR

## (undated) DEVICE — DRAPE SPLIT SHEET 77" X 108"

## (undated) DEVICE — TUBING SUCTION 20FT

## (undated) DEVICE — CANISTER W/GEL INFOVAC 500ML

## (undated) DEVICE — DRSG VAC GRANUFOAM MEDIUM (BLACK)

## (undated) DEVICE — GLV 6.5 PROTEXIS (WHITE)

## (undated) DEVICE — PACK MAJOR ABDOMINAL SUPINE

## (undated) DEVICE — DRAIN JACKSON PRATT 7MM FLAT FULL NO TROCAR

## (undated) DEVICE — ELCTR BOVIE PENCIL SMOKE EVACUATION

## (undated) DEVICE — DRSG TEGADERM 6 X 8"

## (undated) DEVICE — PREP CHLORAPREP HI-LITE ORANGE 26ML

## (undated) DEVICE — STRYKER PULSE LAVAGE WITH HIGH FLOW TIP

## (undated) DEVICE — BLADE SCALPEL SAFETYLOCK #15

## (undated) DEVICE — WARMING BLANKET LOWER ADULT

## (undated) DEVICE — STAPLER SKIN VISI-STAT 35 WIDE

## (undated) DEVICE — DRSG XEROFORM 1 X 8"

## (undated) DEVICE — DRSG OPSITE 13.75 X 4"

## (undated) DEVICE — BLADE SCALPEL SAFETYLOCK #10